# Patient Record
Sex: MALE | Race: WHITE | Employment: OTHER | URBAN - METROPOLITAN AREA
[De-identification: names, ages, dates, MRNs, and addresses within clinical notes are randomized per-mention and may not be internally consistent; named-entity substitution may affect disease eponyms.]

---

## 2017-09-03 ENCOUNTER — HOSPITAL ENCOUNTER (INPATIENT)
Age: 72
LOS: 2 days | Discharge: HOME | DRG: 251 | End: 2017-09-05
Attending: EMERGENCY MEDICINE | Admitting: INTERNAL MEDICINE
Payer: MEDICARE

## 2017-09-03 ENCOUNTER — APPOINTMENT (OUTPATIENT)
Dept: GENERAL RADIOLOGY | Age: 72
DRG: 251 | End: 2017-09-03
Attending: EMERGENCY MEDICINE
Payer: MEDICARE

## 2017-09-03 DIAGNOSIS — R06.03 RESPIRATORY DISTRESS: Primary | ICD-10-CM

## 2017-09-03 DIAGNOSIS — J81.0 ACUTE PULMONARY EDEMA (HCC): ICD-10-CM

## 2017-09-03 DIAGNOSIS — I10 ESSENTIAL HYPERTENSION: ICD-10-CM

## 2017-09-03 PROBLEM — I50.1 PULMONARY EDEMA CARDIAC CAUSE (HCC): Status: ACTIVE | Noted: 2017-09-03

## 2017-09-03 PROBLEM — R73.9 ACUTE HYPERGLYCEMIA: Status: ACTIVE | Noted: 2017-09-03

## 2017-09-03 LAB
ALBUMIN SERPL-MCNC: 3.1 G/DL (ref 3.2–4.6)
ALBUMIN/GLOB SERPL: 0.7 {RATIO} (ref 1.2–3.5)
ALP SERPL-CCNC: 150 U/L (ref 50–136)
ALT SERPL-CCNC: 31 U/L (ref 12–65)
ANION GAP SERPL CALC-SCNC: 14 MMOL/L (ref 7–16)
ARTERIAL PATENCY WRIST A: POSITIVE
AST SERPL-CCNC: 56 U/L (ref 15–37)
ATRIAL RATE: 137 BPM
BASE DEFICIT BLDA-SCNC: 7.9 MMOL/L (ref 0–2)
BASOPHILS # BLD: 0.1 K/UL (ref 0–0.2)
BASOPHILS NFR BLD: 0 % (ref 0–2)
BDY SITE: ABNORMAL
BILIRUB SERPL-MCNC: 0.7 MG/DL (ref 0.2–1.1)
BNP SERPL-MCNC: 1241 PG/ML
BUN SERPL-MCNC: 6 MG/DL (ref 8–23)
CALCIUM SERPL-MCNC: 8.4 MG/DL (ref 8.3–10.4)
CALCULATED P AXIS, ECG09: -99 DEGREES
CALCULATED R AXIS, ECG10: 70 DEGREES
CALCULATED T AXIS, ECG11: -90 DEGREES
CHLORIDE SERPL-SCNC: 101 MMOL/L (ref 98–107)
CO2 SERPL-SCNC: 24 MMOL/L (ref 21–32)
COHGB MFR BLD: 1.4 % (ref 0.5–1.5)
CREAT SERPL-MCNC: 1.42 MG/DL (ref 0.8–1.5)
DIAGNOSIS, 93000: NORMAL
DIFFERENTIAL METHOD BLD: ABNORMAL
DO-HGB BLD-MCNC: 3 % (ref 0–5)
EOSINOPHIL # BLD: 0.2 K/UL (ref 0–0.8)
EOSINOPHIL NFR BLD: 1 % (ref 0.5–7.8)
ERYTHROCYTE [DISTWIDTH] IN BLOOD BY AUTOMATED COUNT: 14.2 % (ref 11.9–14.6)
GLOBULIN SER CALC-MCNC: 4.2 G/DL (ref 2.3–3.5)
GLUCOSE BLD STRIP.AUTO-MCNC: 126 MG/DL (ref 65–100)
GLUCOSE BLD STRIP.AUTO-MCNC: 129 MG/DL (ref 65–100)
GLUCOSE BLD STRIP.AUTO-MCNC: 131 MG/DL (ref 65–100)
GLUCOSE BLD STRIP.AUTO-MCNC: 214 MG/DL (ref 65–100)
GLUCOSE SERPL-MCNC: 362 MG/DL (ref 65–100)
HCO3 BLDA-SCNC: 19 MMOL/L (ref 22–26)
HCT VFR BLD AUTO: 47.5 % (ref 41.1–50.3)
HGB BLD-MCNC: 16.5 G/DL (ref 13.6–17.2)
HGB BLDMV-MCNC: 16.5 GM/DL (ref 11.7–15)
IMM GRANULOCYTES # BLD: 0.1 K/UL (ref 0–0.5)
IMM GRANULOCYTES NFR BLD: 0.5 % (ref 0–5)
LACTATE BLD-SCNC: 6 MMOL/L (ref 0.5–1.9)
LYMPHOCYTES # BLD: 7 K/UL (ref 0.5–4.6)
LYMPHOCYTES NFR BLD: 47 % (ref 13–44)
MCH RBC QN AUTO: 31.4 PG (ref 26.1–32.9)
MCHC RBC AUTO-ENTMCNC: 34.7 G/DL (ref 31.4–35)
MCV RBC AUTO: 90.5 FL (ref 79.6–97.8)
METHGB MFR BLD: 0.5 % (ref 0–1.5)
MONOCYTES # BLD: 0.7 K/UL (ref 0.1–1.3)
MONOCYTES NFR BLD: 5 % (ref 4–12)
NEUTS SEG # BLD: 6.9 K/UL (ref 1.7–8.2)
NEUTS SEG NFR BLD: 47 % (ref 43–78)
OXYHGB MFR BLDA: 95.1 % (ref 94–97)
P-R INTERVAL, ECG05: 142 MS
PCO2 BLDA: 44 MMHG (ref 35–45)
PEEP RESPIRATORY: 8 CM[H2O]
PH BLDA: 7.25 [PH] (ref 7.35–7.45)
PLATELET # BLD AUTO: 248 K/UL (ref 150–450)
PMV BLD AUTO: 9.6 FL (ref 10.8–14.1)
PO2 BLDA: 101 MMHG (ref 80–105)
POTASSIUM SERPL-SCNC: 3.3 MMOL/L (ref 3.5–5.1)
PROT SERPL-MCNC: 7.3 G/DL (ref 6.3–8.2)
Q-T INTERVAL, ECG07: 294 MS
QRS DURATION, ECG06: 120 MS
QTC CALCULATION (BEZET), ECG08: 443 MS
RBC # BLD AUTO: 5.25 M/UL (ref 4.23–5.67)
RESP RATE: 30
SAO2 % BLD: 97 % (ref 92–98.5)
SERVICE CMNT-IMP: ABNORMAL
SODIUM SERPL-SCNC: 139 MMOL/L (ref 136–145)
TROPONIN I BLD-MCNC: 0.1 NG/ML (ref 0–0.08)
TROPONIN I SERPL-MCNC: 0.21 NG/ML (ref 0.02–0.05)
VENTILATION MODE VENT: ABNORMAL
VENTRICULAR RATE, ECG03: 137 BPM
VT SETTING VENT: 700 ML
WBC # BLD AUTO: 14.9 K/UL (ref 4.3–11.1)

## 2017-09-03 PROCEDURE — 74011000250 HC RX REV CODE- 250: Performed by: EMERGENCY MEDICINE

## 2017-09-03 PROCEDURE — 74011250637 HC RX REV CODE- 250/637: Performed by: EMERGENCY MEDICINE

## 2017-09-03 PROCEDURE — 77010033678 HC OXYGEN DAILY

## 2017-09-03 PROCEDURE — 74011250637 HC RX REV CODE- 250/637: Performed by: INTERNAL MEDICINE

## 2017-09-03 PROCEDURE — 96365 THER/PROPH/DIAG IV INF INIT: CPT | Performed by: EMERGENCY MEDICINE

## 2017-09-03 PROCEDURE — 96375 TX/PRO/DX INJ NEW DRUG ADDON: CPT | Performed by: EMERGENCY MEDICINE

## 2017-09-03 PROCEDURE — 80053 COMPREHEN METABOLIC PANEL: CPT | Performed by: EMERGENCY MEDICINE

## 2017-09-03 PROCEDURE — 96366 THER/PROPH/DIAG IV INF ADDON: CPT | Performed by: EMERGENCY MEDICINE

## 2017-09-03 PROCEDURE — 36600 WITHDRAWAL OF ARTERIAL BLOOD: CPT

## 2017-09-03 PROCEDURE — 94760 N-INVAS EAR/PLS OXIMETRY 1: CPT

## 2017-09-03 PROCEDURE — C8929 TTE W OR WO FOL WCON,DOPPLER: HCPCS

## 2017-09-03 PROCEDURE — 74011250636 HC RX REV CODE- 250/636: Performed by: EMERGENCY MEDICINE

## 2017-09-03 PROCEDURE — 82803 BLOOD GASES ANY COMBINATION: CPT

## 2017-09-03 PROCEDURE — 94660 CPAP INITIATION&MGMT: CPT

## 2017-09-03 PROCEDURE — 99285 EMERGENCY DEPT VISIT HI MDM: CPT | Performed by: EMERGENCY MEDICINE

## 2017-09-03 PROCEDURE — 71010 XR CHEST PORT: CPT

## 2017-09-03 PROCEDURE — 93005 ELECTROCARDIOGRAM TRACING: CPT | Performed by: EMERGENCY MEDICINE

## 2017-09-03 PROCEDURE — 83880 ASSAY OF NATRIURETIC PEPTIDE: CPT | Performed by: EMERGENCY MEDICINE

## 2017-09-03 PROCEDURE — 36415 COLL VENOUS BLD VENIPUNCTURE: CPT | Performed by: INTERNAL MEDICINE

## 2017-09-03 PROCEDURE — 74011000250 HC RX REV CODE- 250: Performed by: INTERNAL MEDICINE

## 2017-09-03 PROCEDURE — 65660000000 HC RM CCU STEPDOWN

## 2017-09-03 PROCEDURE — 82962 GLUCOSE BLOOD TEST: CPT

## 2017-09-03 PROCEDURE — 84484 ASSAY OF TROPONIN QUANT: CPT

## 2017-09-03 PROCEDURE — 83605 ASSAY OF LACTIC ACID: CPT

## 2017-09-03 PROCEDURE — 74011250636 HC RX REV CODE- 250/636: Performed by: INTERNAL MEDICINE

## 2017-09-03 PROCEDURE — 85025 COMPLETE CBC W/AUTO DIFF WBC: CPT | Performed by: EMERGENCY MEDICINE

## 2017-09-03 RX ORDER — ISOSORBIDE MONONITRATE 30 MG/1
30 TABLET, EXTENDED RELEASE ORAL DAILY
Status: DISCONTINUED | OUTPATIENT
Start: 2017-09-03 | End: 2017-09-05 | Stop reason: HOSPADM

## 2017-09-03 RX ORDER — FUROSEMIDE 40 MG/1
40 TABLET ORAL DAILY
Status: DISCONTINUED | OUTPATIENT
Start: 2017-09-04 | End: 2017-09-05

## 2017-09-03 RX ORDER — CARVEDILOL 12.5 MG/1
12.5 TABLET ORAL 2 TIMES DAILY WITH MEALS
Status: DISCONTINUED | OUTPATIENT
Start: 2017-09-03 | End: 2017-09-04

## 2017-09-03 RX ORDER — SPIRONOLACTONE 25 MG/1
25 TABLET ORAL DAILY
Status: DISCONTINUED | OUTPATIENT
Start: 2017-09-03 | End: 2017-09-05 | Stop reason: HOSPADM

## 2017-09-03 RX ORDER — INSULIN LISPRO 100 [IU]/ML
INJECTION, SOLUTION INTRAVENOUS; SUBCUTANEOUS
Status: DISCONTINUED | OUTPATIENT
Start: 2017-09-03 | End: 2017-09-05 | Stop reason: HOSPADM

## 2017-09-03 RX ORDER — FUROSEMIDE 10 MG/ML
60 INJECTION INTRAMUSCULAR; INTRAVENOUS
Status: COMPLETED | OUTPATIENT
Start: 2017-09-03 | End: 2017-09-03

## 2017-09-03 RX ORDER — ATORVASTATIN CALCIUM 40 MG/1
40 TABLET, FILM COATED ORAL DAILY
Status: DISCONTINUED | OUTPATIENT
Start: 2017-09-03 | End: 2017-09-05 | Stop reason: HOSPADM

## 2017-09-03 RX ORDER — NITROGLYCERIN 0.4 MG/1
0.4 TABLET SUBLINGUAL
Status: DISCONTINUED | OUTPATIENT
Start: 2017-09-03 | End: 2017-09-04 | Stop reason: SDUPTHER

## 2017-09-03 RX ORDER — GUAIFENESIN 100 MG/5ML
81 LIQUID (ML) ORAL DAILY
Status: DISCONTINUED | OUTPATIENT
Start: 2017-09-03 | End: 2017-09-04 | Stop reason: SDUPTHER

## 2017-09-03 RX ORDER — ENOXAPARIN SODIUM 100 MG/ML
40 INJECTION SUBCUTANEOUS EVERY 24 HOURS
Status: DISCONTINUED | OUTPATIENT
Start: 2017-09-03 | End: 2017-09-05 | Stop reason: HOSPADM

## 2017-09-03 RX ORDER — ASPIRIN 81 MG/1
81 TABLET ORAL DAILY
Status: DISCONTINUED | OUTPATIENT
Start: 2017-09-03 | End: 2017-09-03 | Stop reason: SDUPTHER

## 2017-09-03 RX ORDER — NITROGLYCERIN 0.4 MG/1
0.4 TABLET SUBLINGUAL AS NEEDED
Status: DISCONTINUED | OUTPATIENT
Start: 2017-09-03 | End: 2017-09-04 | Stop reason: SDUPTHER

## 2017-09-03 RX ORDER — TEMAZEPAM 15 MG/1
15 CAPSULE ORAL
Status: DISCONTINUED | OUTPATIENT
Start: 2017-09-03 | End: 2017-09-05 | Stop reason: HOSPADM

## 2017-09-03 RX ORDER — GUAIFENESIN 100 MG/5ML
324 LIQUID (ML) ORAL
Status: COMPLETED | OUTPATIENT
Start: 2017-09-03 | End: 2017-09-03

## 2017-09-03 RX ORDER — OXYCODONE AND ACETAMINOPHEN 5; 325 MG/1; MG/1
1 TABLET ORAL
Status: DISCONTINUED | OUTPATIENT
Start: 2017-09-03 | End: 2017-09-05 | Stop reason: HOSPADM

## 2017-09-03 RX ORDER — NITROGLYCERIN 20 MG/100ML
5 INJECTION INTRAVENOUS
Status: COMPLETED | OUTPATIENT
Start: 2017-09-03 | End: 2017-09-03

## 2017-09-03 RX ORDER — CLOPIDOGREL BISULFATE 75 MG/1
75 TABLET ORAL DAILY
Status: DISCONTINUED | OUTPATIENT
Start: 2017-09-03 | End: 2017-09-05 | Stop reason: HOSPADM

## 2017-09-03 RX ORDER — POTASSIUM CHLORIDE 20 MEQ/1
20 TABLET, EXTENDED RELEASE ORAL 2 TIMES DAILY
Status: COMPLETED | OUTPATIENT
Start: 2017-09-03 | End: 2017-09-04

## 2017-09-03 RX ORDER — FUROSEMIDE 40 MG/1
40 TABLET ORAL 2 TIMES DAILY
Status: COMPLETED | OUTPATIENT
Start: 2017-09-03 | End: 2017-09-03

## 2017-09-03 RX ADMIN — ENOXAPARIN SODIUM 40 MG: 40 INJECTION SUBCUTANEOUS at 10:29

## 2017-09-03 RX ADMIN — CARVEDILOL 12.5 MG: 12.5 TABLET, FILM COATED ORAL at 10:23

## 2017-09-03 RX ADMIN — ASPIRIN 81 MG 324 MG: 81 TABLET ORAL at 02:18

## 2017-09-03 RX ADMIN — POTASSIUM CHLORIDE 20 MEQ: 20 TABLET, EXTENDED RELEASE ORAL at 17:04

## 2017-09-03 RX ADMIN — NITROGLYCERIN 0.4 MG: 0.4 TABLET SUBLINGUAL at 02:25

## 2017-09-03 RX ADMIN — FUROSEMIDE 40 MG: 40 TABLET ORAL at 10:23

## 2017-09-03 RX ADMIN — FUROSEMIDE 60 MG: 10 INJECTION, SOLUTION INTRAMUSCULAR; INTRAVENOUS at 03:49

## 2017-09-03 RX ADMIN — NITROGLYCERIN 5 MCG/MIN: 20 INJECTION INTRAVENOUS at 02:18

## 2017-09-03 RX ADMIN — FUROSEMIDE 40 MG: 40 TABLET ORAL at 17:04

## 2017-09-03 RX ADMIN — CLOPIDOGREL BISULFATE 75 MG: 75 TABLET ORAL at 10:23

## 2017-09-03 RX ADMIN — SACUBITRIL AND VALSARTAN 1 TABLET: 24; 26 TABLET, FILM COATED ORAL at 17:04

## 2017-09-03 RX ADMIN — ISOSORBIDE MONONITRATE 30 MG: 30 TABLET, EXTENDED RELEASE ORAL at 10:23

## 2017-09-03 RX ADMIN — PERFLUTREN 1 ML: 6.52 INJECTION, SUSPENSION INTRAVENOUS at 09:00

## 2017-09-03 RX ADMIN — CARVEDILOL 12.5 MG: 12.5 TABLET, FILM COATED ORAL at 21:23

## 2017-09-03 RX ADMIN — ATORVASTATIN CALCIUM 40 MG: 40 TABLET, FILM COATED ORAL at 10:23

## 2017-09-03 RX ADMIN — ASPIRIN 81 MG 81 MG: 81 TABLET ORAL at 10:24

## 2017-09-03 RX ADMIN — SPIRONOLACTONE 25 MG: 25 TABLET, FILM COATED ORAL at 10:23

## 2017-09-03 RX ADMIN — POTASSIUM CHLORIDE 20 MEQ: 20 TABLET, EXTENDED RELEASE ORAL at 10:23

## 2017-09-03 NOTE — ED TRIAGE NOTES
Pt arrives via EMS for sob. States started tonight with hx of CHF. EMS states initial o2 78% on RA. Pt received 2 g mag, solu medrol, and duo treatment via cpap in route. EMS states o2 up to 88%.

## 2017-09-03 NOTE — PROGRESS NOTES
Bedside and Verbal shift change report given to self (oncoming nurse) by Naomi Butts RN (offgoing nurse). Report included the following information SBAR, Kardex, MAR and Recent Results.

## 2017-09-03 NOTE — ED NOTES
TRANSFER - OUT REPORT:    Verbal report given to [id number] CORONA Rojas (name) on Cynthia Prasad  being transferred to [id number] (unit) for routine progression of care       Report consisted of patients Situation, Background, Assessment and   Recommendations(SBAR). Information from the following report(s) SBAR was reviewed with the receiving nurse. Lines:   Peripheral IV 09/13/16 Right Antecubital (Active)       Peripheral IV 09/14/16 Left Arm (Active)        Opportunity for questions and clarification was provided.       Patient transported with:   Monitor

## 2017-09-03 NOTE — PROGRESS NOTES
Arrived via ED stretcher. Walked to bed with assistance. Connected to monitor, oriented to room. Denies pain. Shortness of breath evident with movement. Unproductive cough present. Instructed to call for assistance before attempting to get out of bed. Initial skin assessment completed. No skin issues noted.

## 2017-09-03 NOTE — IP AVS SNAPSHOT
Jose Juan Jose 
 
 
 [address] [facility] [address] [facility] 
[phone] Patient: Qiana Wild MRN: [id number] :1945 You are allergic to the following No active allergies Recent Documentation Height Weight BMI Smoking Status 1.753 m 59.2 kg 19.27 kg/m2 Light Tobacco Smoker Emergency Contacts Name Discharge Info Relation Home Work Mobile Romina Gray  Parent [1] [phone] About your hospitalization You were admitted on:  September 3, 2017 You last received care in the:  [facility] 3 TELEMETRY You were discharged on:  2017 Unit phone number:  [phone] Why you were hospitalized Your primary diagnosis was:  Pulmonary Edema Cardiac Cause (Hcc) Your diagnoses also included:  Coronary Atherosclerosis Of Native Coronary Artery, Ischemic Cardiomyopathy, Aicd (Automatic Cardioverter/Defibrillator) Present, Htn (Hypertension), Benign, Mixed Hyperlipidemia, Chronic Systolic Heart Failure (Hcc), Acute Hyperglycemia Providers Seen During Your Hospitalizations Provider Role Specialty Primary office phone Tegan Michele MD Attending Provider Emergency Medicine [phone] Jeimy Reaves MD Attending Provider Cardiology [phone] Your Primary Care Physician (PCP) Primary Care Physician Office Phone Office Fax [address] [place] [facility] [place] [facility] 55 [facility] Follow-up Information Follow up With Details Comments Contact Info Haydee Gonzalez MD  As needed [address] [place] [id number] [phone] Loreta Sterling MD  In [place] office  @ [address] [place] [address] Road [address] [place] [address] 
[phone] Your Appointments 2017 10:00 AM EDT TRANSITIONAL CARE MANAGEMENT with Loreta Sterling MD  
[facility] ([facility]) [address] [place] [address] 
[address] Star [place]  
[phone] Friday September 22, 2017  9:20 AM EDT  
OFFICE DEVICE CHECKS with [facility] CARDIOLOGY [place] [facility] ([address] [place] [address]) [address]  
[phone] This upcoming device check will take place IN OUR OFFICE. Please arrive 15 minutes early to review any necessary paperwork requirements. If you have any further questions or need to change this appointment, we are happy to help and can be reached at [phone]. Current Discharge Medication List  
  
START taking these medications Dose & Instructions Dispensing Information Comments Morning Noon Evening Bedtime  
 isosorbide mononitrate ER 30 mg tablet Commonly known as:  IMDUR Your next dose is:  9/6 Dose:  30 mg Take 1 Tab by mouth daily. Quantity:  30 Tab Refills:  6  
     
   
   
   
  
 sacubitril-valsartan 24-26 mg tablet Commonly known as:  ENTRESTO Your next dose is:  9/5 Dose:  1 Tab Take 1 Tab by mouth every twelve (12) hours. Quantity:  60 Tab Refills:  6 CONTINUE these medications which have CHANGED Dose & Instructions Dispensing Information Comments Morning Noon Evening Bedtime  
 carvedilol 3.125 mg tablet Commonly known as:  Michelle Finder What changed:   
- medication strength 
- how much to take Your next dose is:  9/5 Dose:  3.125 mg Take 1 Tab by mouth two (2) times daily (with meals). Quantity:  60 Tab Refills:  6 CONTINUE these medications which have NOT CHANGED Dose & Instructions Dispensing Information Comments Morning Noon Evening Bedtime  
 aspirin 81 mg chewable tablet Your next dose is:  9/6 Dose:  81 mg Take 1 Tab by mouth daily. Quantity:  30 Tab Refills:  11  
     
   
   
   
  
 atorvastatin 40 mg tablet Commonly known as:  LIPITOR Your next dose is:  9/6 Dose:  40 mg Take 1 Tab by mouth daily. Quantity:  90 Tab Refills:  3 PLAVIX 75 mg Tab Generic drug:  clopidogrel Your next dose is:  9/6 Dose:  75 mg Take 75 mg by mouth daily. Last dose 5-10-11. Refills:  0  
     
   
   
   
  
 spironolactone 25 mg tablet Commonly known as:  ALDACTONE Your next dose is:  9/6 Dose:  25 mg Take 1 Tab by mouth daily. Quantity:  90 Tab Refills:  3 STOP taking these medications   
 enalapril 5 mg tablet Commonly known as:  Darreld Standard Where to Get Your Medications Information on where to get these meds will be given to you by the nurse or doctor. ! Ask your nurse or doctor about these medications  
  carvedilol 3.125 mg tablet  
 isosorbide mononitrate ER 30 mg tablet  
 sacubitril-valsartan 24-26 mg tablet Discharge Instructions Sacubitril/Valsartan Kyler Harish) - (By mouth) Why this medicine is used:  
Treats chronic heart failure. Contact a nurse or doctor right away if you have: 
· Itching or hives, swelling in your face or hands, swelling or tingling in your mouth or throat, chest tightness, trouble breathing · Uneven heartbeat, trouble breathing · Confusion, weakness, numbness in your hands, feet, or lips · Decrease in how much or how often you urinate, bloody or cloudy urine · Lightheadedness, dizziness, or fainting · Lower back or side pain © 2017 [facility] Information is for End User's use only and may not be sold, redistributed or otherwise used for commercial purposes. Heart Failure: Care Instructions Your Care Instructions Heart failure occurs when your heart does not pump as much blood as the body needs. Failure does not mean that the heart has stopped pumping but rather that it is not pumping as well as it should.  Over time, this causes fluid buildup in your lungs and other parts of your body. Fluid buildup can cause shortness of breath, fatigue, swollen ankles, and other problems. By taking medicines regularly, reducing sodium (salt) in your diet, checking your weight every day, and making lifestyle changes, you can feel better and live longer. Follow-up care is a key part of your treatment and safety. Be sure to make and go to all appointments, and call your doctor if you are having problems. It's also a good idea to know your test results and keep a list of the medicines you take. How can you care for yourself at home? Medicines · Be safe with medicines. Take your medicines exactly as prescribed. Call your doctor if you think you are having a problem with your medicine. · Do not take any vitamins, over-the-counter medicine, or herbal products without talking to your doctor first. Julius Bolder not take ibuprofen (Advil or Motrin) and naproxen (Aleve) without talking to your doctor first. They could make your heart failure worse. · You may be taking some of the following medicine. ¨ Beta-blockers can slow heart rate, decrease blood pressure, and improve your condition. Taking a beta-blocker may lower your chance of needing to be hospitalized. ¨ Angiotensin-converting enzyme inhibitors (ACEIs) reduce the heart's workload, lower blood pressure, and reduce swelling. Taking an ACEI may lower your chance of needing to be hospitalized again. ¨ Angiotensin II receptor blockers (ARBs) work like ACEIs. Your doctor may prescribe them instead of ACEIs. ¨ Diuretics, also called water pills, reduce swelling. ¨ Potassium supplements replace this important mineral, which is sometimes lost with diuretics. ¨ Aspirin and other blood thinners prevent blood clots, which can cause a stroke or heart attack. You will get more details on the specific medicines your doctor prescribes. Diet · Your doctor may suggest that you limit sodium to 2,000 milligrams (mg) a day or less. That is less than 1 teaspoon of salt a day, including all the salt you eat in cooking or in packaged foods. People get most of their sodium from processed foods. Fast food and restaurant meals also tend to be very high in sodium. · Ask your doctor how much liquid you can drink each day. You may have to limit liquids. Weight · Weigh yourself without clothing at the same time each day. Record your weight. Call your doctor if you have a sudden weight gain, such as more than 2 to 3 pounds in a day or 5 pounds in a week. (Your doctor may suggest a different range of weight gain.) A sudden weight gain may mean that your heart failure is getting worse. Activity level · Start light exercise (if your doctor says it is okay). Even if you can only do a small amount, exercise will help you get stronger, have more energy, and manage your weight and your stress. Walking is an easy way to get exercise. Start out by walking a little more than you did before. Bit by bit, increase the amount you walk. · When you exercise, watch for signs that your heart is working too hard. You are pushing yourself too hard if you cannot talk while you are exercising. If you become short of breath or dizzy or have chest pain, stop, sit down, and rest. 
· If you feel \"wiped out\" the day after you exercise, walk slower or for a shorter distance until you can work up to a better pace. · Get enough rest at night. Sleeping with 1 or 2 pillows under your upper body and head may help you breathe easier. Lifestyle changes · Do not smoke. Smoking can make a heart condition worse. If you need help quitting, talk to your doctor about stop-smoking programs and medicines. These can increase your chances of quitting for good. Quitting smoking may be the most important step you can take to protect your heart. · Limit alcohol to 2 drinks a day for men and 1 drink a day for women. Too much alcohol can cause health problems. · Avoid getting sick from colds and the flu. Get a pneumococcal vaccine shot. If you have had one before, ask your doctor whether you need another dose. Get a flu shot each year. If you must be around people with colds or the flu, wash your hands often. When should you call for help? Call 911 if you have symptoms of sudden heart failure such as: 
· You have severe trouble breathing. · You cough up pink, foamy mucus. · You have a new irregular or rapid heartbeat. Call your doctor now or seek immediate medical care if: 
· You have new or increased shortness of breath. · You are dizzy or lightheaded, or you feel like you may faint. · You have sudden weight gain, such as more than 2 to 3 pounds in a day or 5 pounds in a week. (Your doctor may suggest a different range of weight gain.) · You have increased swelling in your legs, ankles, or feet. · You are suddenly so tired or weak that you cannot do your usual activities. Watch closely for changes in your health, and be sure to contact your doctor if: 
· You develop new symptoms. Where can you learn more? Go to http://raj-savannah.info/. Enter P732 in the search box to learn more about \"Heart Failure: Care Instructions. \" Current as of: April 3, 2017 Content Version: 11.3 © 2909-3433 [employer]. Care instructions adapted under license by [employer] (which disclaims liability or warranty for this information). If you have questions about a medical condition or this instruction, always ask your healthcare professional. [place] [address] any warranty or liability for your use of this information. Cardiac Catheterization/Angiography Discharge Instructions *Check the puncture site frequently for swelling or bleeding. If you see any bleeding, lie down and apply pressure over the area with a clean town or washcloth.  Notify your doctor for any redness, swelling, drainage or oozing from the puncture site. Notify your doctor for any fever or chills. *If the leg or arm with the puncture becomes cold, numb or painful, call Dr Leticia Farrell at  Leticia Martin *Activity should be limited for the next 48 hours. Climb stairs as little as possible and avoid any stooping, bending or strenuous activity for 48 hours. No heavy lifting (anything over 10 pounds) for three days. *Do not drive for 48 hours. *You may resume your usual diet. Drink more fluids than usual. 
 
*Have a responsible person drive you home and stay with you for at least 24 hours after your heart catheterization/angiography. *You may remove the bandage from your {ARM/GROIN:79380} in 24 hours. You may shower in 24 hours. No tub baths, hot tubs or swimming for one week. Do not place any lotions, creams, powders, ointments over the puncture site for one week. You may place a clean band-aid over the puncture site each day for 5 days. Change this daily. Discharge Orders Procedure Order Date Status Priority Quantity Spec Type Associated Dx METABOLIC PANEL, BASIC 57/57/46 1352 Future Routine 1 Blood Comments:  Indication --CHF Please obtain in one week, one day prior to appt. [facility] Announcement We are excited to announce that we are making your provider's discharge notes available to you in [facility]. You will see these notes when they are completed and signed by the physician that discharged you from your recent hospital stay. If you have any questions or concerns about any information you see in [facility], please call the Health Information Department where you were seen or reach out to your Primary Care Provider for more information about your plan of care. Introducing [facility]! [facility] introduces [facility] patient portal. Now you can access parts of your medical record, email your doctor's office, and request medication refills online.    
 
1. In your internet browser, go to [email or web address] 2. Click on the First Time User? Click Here link in the Sign In box. You will see the New Member Sign Up page. 3. Enter your [facility] Access Code exactly as it appears below. You will not need to use this code after youve completed the sign-up process. If you do not sign up before the expiration date, you must request a new code. · [facility] Access Code: [email or web address] Expires: 9/14/2017  7:53 AM 
 
4. Enter the last four digits of your Social Security Number (xxxx) and Date of Birth (mm/dd/yyyy) as indicated and click Submit. You will be taken to the next sign-up page. 5. Create a [facility] ID. This will be your [facility] login ID and cannot be changed, so think of one that is secure and easy to remember. 6. Create a [facility] password. You can change your password at any time. 7. Enter your Password Reset Question and Answer. This can be used at a later time if you forget your password. 8. Enter your e-mail address. You will receive e-mail notification when new information is available in [address] 9. Click Sign Up. You can now view and download portions of your medical record. 10. Click the Download Summary menu link to download a portable copy of your medical information. If you have questions, please visit the Frequently Asked Questions section of the [facility] website. Remember, [facility] is NOT to be used for urgent needs. For medical emergencies, dial 911. Now available from your iPhone and Android! General Information Please provide this summary of care documentation to your next provider. Patient Signature:  ____________________________________________________________ Date:  ____________________________________________________________  
  
Suzanna Pane Provider Signature:  ____________________________________________________________ Date:  ____________________________________________________________

## 2017-09-03 NOTE — ED NOTES
This RN did not see patient. Report was given to Karrie GAITAN RN. Co-workers assisted with EMS arrival, blood draw, and medications.

## 2017-09-03 NOTE — H&P
[facility] CARDIOLOGY ADMIT NOTE    9/3/2017 6:33 AM    Admit Date: 9/3/2017    Admit Diagnosis: Pulmonary edema cardiac cause [facility])  Family Physician:   Other Physician:  Subjective:   Brandon Alvarez is a 81 y.o. male  who has an ischemic CM. He presents with acute dyspnea pulmonary edema after watching football with his friends last night. He had mild chest tightness and then developed respiratory distress lying back in bed. Episode of breathing problems treated at [facility] about a month ago. He seems to be unsure about his medical regimen but does note no lasix for the last two days     No Known Allergies    Past Medical History:   Diagnosis Date    AICD (automatic cardioverter/defibrillator) present 5/18/2016    Arrhythmia     irregular    CAD (coronary artery disease)     stent    Chronic systolic heart failure ([address]     Coronary atherosclerosis of native coronary artery     GERD (gastroesophageal reflux disease)     Hypertension     Ischemic cardiomyopathy     Tobacco use disorder 5/18/2016       Family History:   CAD-    Social History:  Alcohol:none                             Tobacco: . Quit    Review of Systems:  Constitutional- no recent fever, chills, abnormal weight loss  Eyes- no recent visual changes  Ears- no recent hearing loss  Cardiac- see HPI  Pulmonary- no hemoptysis   Gastrointestinal- no melena   Genitourinary- no hematuria or dysuria  Neurologic- no seizures or syncope  Musculoskeletal- no acute joint pain or myalgias           Objective:      Vitals:    09/03/17 0439 09/03/17 0459 09/03/17 0519 09/03/17 0543   BP: 115/70 93/67 93/67    Pulse: 93 96 90    Resp:       SpO2: 98% 99% 99% 95%   Weight:       Height:           Physical Exam:  Neuro:  alert and oriented. No focal neurologic deficits  Skin: warm and dry  HEENT:  NC/AT, conjunctiva clear, sclera anicteric,   Neck: supple without adenopathy or thyromegaly.  No JVD  CV    apex lift displaced anterior axillary line soft blow   Lungs: clear bilaterally  Abdomen: soft, , nondistended, normal bowel sounds  Extremities: no  edema      2+   pulses     Psychiatric: normal mood and affect   EKG--sinus tach LBBB  CXR pulmonary edema       No current facility-administered medications on file prior to encounter. Current Outpatient Prescriptions on File Prior to Encounter   Medication Sig Dispense Refill    carvedilol (COREG) 12.5 mg tablet Take 1 Tab by mouth two (2) times daily (with meals). 180 Tab 3    spironolactone (ALDACTONE) 25 mg tablet Take 1 Tab by mouth daily. 90 Tab 3    atorvastatin (LIPITOR) 40 mg tablet Take 1 Tab by mouth daily. 90 Tab 3    enalapril (VASOTEC) 5 mg tablet Take 1 Tab by mouth daily. 180 Tab 3    aspirin 81 mg chewable tablet Take 1 Tab by mouth daily. 30 Tab 11    oxycodone-acetaminophen (PERCOCET) 5-325 mg per tablet Take 1 Tab by mouth every four (4) hours as needed. 10 Tab 0    clopidogrel (PLAVIX) 75 mg tablet Take 75 mg by mouth daily. Last dose 5-10-11. Data Review:   Recent Labs      09/03/17   0158   NA  139   K  3.3*   BUN  6*   CREA  1.42   GLU  362*   WBC  14.9*   HGB  16.5   HCT  47.5   PLT  248            Assessment and Plan:     Principal Problem:    Pulmonary edema cardiac cause ([facility] (9/3/2017)    history of MI stenting last cath 2009     may need repeat. noncompliance lasix an issue as well   Active Problems:    Coronary atherosclerosis of native coronary artery (10/14/2009)      Ischemic cardiomyopathy (10/14/2009)      Mixed hyperlipidemia (10/14/2009)      HTN (hypertension), benign (10/14/2009)      Chronic systolic heart failure ([address] (5/17/2011)      AICD (automatic cardioverter/defibrillator) present (5/18/2016)      Acute hyperglycemia (9/3/2017)      Overview: secondary to respiratory and stress ?  new onset DM             SSI and follow           Christian Comer MD  [facility] Cardiology  [phone]

## 2017-09-03 NOTE — IP AVS SNAPSHOT
[address] 
[phone] Patient: Luis A Greenwood MRN: [id number] :1945 Current Discharge Medication List  
  
START taking these medications Dose & Instructions Dispensing Information Comments Morning Noon Evening Bedtime  
 isosorbide mononitrate ER 30 mg tablet Commonly known as:  IMDUR Your next dose is:   Dose:  30 mg Take 1 Tab by mouth daily. Quantity:  30 Tab Refills:  6  
     
   
   
   
  
 sacubitril-valsartan 24-26 mg tablet Commonly known as:  ENTRESTO Your next dose is:   Dose:  1 Tab Take 1 Tab by mouth every twelve (12) hours. Quantity:  60 Tab Refills:  6 CONTINUE these medications which have CHANGED Dose & Instructions Dispensing Information Comments Morning Noon Evening Bedtime  
 carvedilol 3.125 mg tablet Commonly known as:  Anuradhafatuma Wetzel What changed:   
- medication strength 
- how much to take Your next dose is:   Dose:  3.125 mg Take 1 Tab by mouth two (2) times daily (with meals). Quantity:  60 Tab Refills:  6 CONTINUE these medications which have NOT CHANGED Dose & Instructions Dispensing Information Comments Morning Noon Evening Bedtime  
 aspirin 81 mg chewable tablet Your next dose is:   Dose:  81 mg Take 1 Tab by mouth daily. Quantity:  30 Tab Refills:  11  
     
   
   
   
  
 atorvastatin 40 mg tablet Commonly known as:  LIPITOR Your next dose is:   Dose:  40 mg Take 1 Tab by mouth daily. Quantity:  90 Tab Refills:  3 PLAVIX 75 mg Tab Generic drug:  clopidogrel Your next dose is:   Dose:  75 mg Take 75 mg by mouth daily. Last dose 5-10-11. Refills:  0  
     
   
   
   
  
 spironolactone 25 mg tablet Commonly known as:  ALDACTONE Your next dose is:    
 Dose:  25 mg Take 1 Tab by mouth daily. Quantity:  90 Tab Refills:  3 STOP taking these medications   
 enalapril 5 mg tablet Commonly known as:  Manuelterrie Triana Where to Get Your Medications Information on where to get these meds will be given to you by the nurse or doctor. ! Ask your nurse or doctor about these medications  
  carvedilol 3.125 mg tablet  
 isosorbide mononitrate ER 30 mg tablet  
 sacubitril-valsartan 24-26 mg tablet

## 2017-09-03 NOTE — ED PROVIDER NOTES
HPI Comments: History is obtained from the patient and EMS. He was apparently feeling fine until around midnight when he had the sudden onset of mild chest pressure and shortness of breath. This got progressively worse. After approximate hour, he got concerned and called EMS. According them, he was very tachypneic and blue with coarse breath sounds and wheezing throughout. He was placed on CPAP and given a DuoNeb and magnesium and brought here. He was hypertensive at the scene, 200/120. The patient is able to say that this is happened to him before. He has no history of COPD. He denies any aggravating or alleviating factors. Elements of this note were created using speech recognition software. As such, errors of speech recognition may be present. Patient is a 81 y.o. male presenting with shortness of breath. The history is provided by the patient. Shortness of Breath   Pertinent negatives include no fever and no vomiting. Past Medical History:   Diagnosis Date    AICD (automatic cardioverter/defibrillator) present 5/18/2016    Arrhythmia     irregular    CAD (coronary artery disease)     stent    Chronic systolic heart failure ([facility] [address]     Coronary atherosclerosis of native coronary artery     GERD (gastroesophageal reflux disease)     Hypertension     Ischemic cardiomyopathy     Tobacco use disorder 5/18/2016       Past Surgical History:   Procedure Laterality Date    CARDIAC SURG PROCEDURE UNLIST      6 stents; last one put in 2 years ago october 2009    HX HEART CATHETERIZATION      2009 october    HX OTHER SURGICAL      scrotum         Family History:   Problem Relation Age of Onset    Heart Disease Father     Cancer Sister        Social History     Social History    Marital status: LEGALLY      Spouse name: N/A    Number of children: N/A    Years of education: N/A     Occupational History    Not on file.      Social History Main Topics    Smoking status: Light Tobacco Smoker     Packs/day: 0.10     Years: 20.00     Last attempt to quit: 5/6/2011    Smokeless tobacco: Never Used    Alcohol use Yes      Comment: occasionally    Drug use: No    Sexual activity: Not on file     Other Topics Concern    Not on file     Social History Narrative         ALLERGIES: Review of patient's allergies indicates no known allergies. Review of Systems   Constitutional: Negative for chills and fever. Respiratory: Positive for shortness of breath. Gastrointestinal: Negative for nausea and vomiting. All other systems reviewed and are negative. Vitals:    09/03/17 0154   BP: (!) 164/114   Pulse: (!) 134   Resp: (!) 35   SpO2: (!) 88%   Weight: 61.7 kg (136 lb)   Height: 5' 9\" (1.753 m)            Physical Exam   Constitutional: He is oriented to person, place, and time. He appears well-developed and well-nourished. He appears distressed. HENT:   Head: Normocephalic and atraumatic. Eyes: Conjunctivae are normal. Pupils are equal, round, and reactive to light. Neck: Normal range of motion. Neck supple. Cardiovascular: Regular rhythm and normal heart sounds. Pulmonary/Chest: He is in respiratory distress. He has rales. Able to answer questions in 2-3 word sentences   Abdominal: Soft. He exhibits no distension. There is no tenderness. Musculoskeletal: He exhibits no edema or tenderness. Neurological: He is alert and oriented to person, place, and time. Skin: Skin is warm and dry. Psychiatric: He has a normal mood and affect. His behavior is normal.   Nursing note and vitals reviewed. MDM  Number of Diagnoses or Management Options  Acute pulmonary edema ([address]): new and requires workup  Essential hypertension:   Respiratory distress:   Diagnosis management comments: Differential diagnosis: COPD exacerbation, CHF, hypertensive emergency, pneumonia, MI  2:03 AM O2 sat originally was in the mid [de-identified] on CPAP.   O2 sat is now mid 90s on BiPAP  2:28 AM patient appears more comfortable on BiPAP, PO2 is 100 on 100% oxygen  2:47 AM ssurgical records shows he had  An echocardiogram recently though I cannot pull up the report. He had one last year which showed an ejection fraction of 10-15% with global hypokinesis  3:07 AM discussed results with patient, need for admission. His blood pressure has come down with the nitroglycerin and his breathing seems to have improved significantly. His troponin is mildly elevated that I feel that his  Main issue at this point is his congestive heart failure. His chest x-ray shows pulmonary edema and his BNP is 1200.  Total critical care time spent on initial evaluation, repeat evaluations, looking up old records managing his blood pressure and  Respiratory distress and speaking with the consultant was 45 minutes  4:34 AM Spoke with Dr Joaquín Aldridge, to see pt for admission       Amount and/or Complexity of Data Reviewed  Clinical lab tests: ordered and reviewed  Tests in the radiology section of CPT®: ordered and reviewed  Tests in the medicine section of CPT®: ordered and reviewed  Decide to obtain previous medical records or to obtain history from someone other than the patient: yes  Review and summarize past medical records: yes  Discuss the patient with other providers: yes  Independent visualization of images, tracings, or specimens: yes    Risk of Complications, Morbidity, and/or Mortality  Presenting problems: high  Diagnostic procedures: moderate  Management options: high    Patient Progress  Patient progress: improved    ED Course       Procedures

## 2017-09-04 LAB
ANION GAP SERPL CALC-SCNC: 10 MMOL/L (ref 7–16)
ATRIAL RATE: 57 BPM
BUN SERPL-MCNC: 16 MG/DL (ref 8–23)
CALCIUM SERPL-MCNC: 8.8 MG/DL (ref 8.3–10.4)
CALCULATED P AXIS, ECG09: 61 DEGREES
CALCULATED R AXIS, ECG10: 27 DEGREES
CALCULATED T AXIS, ECG11: 142 DEGREES
CHLORIDE SERPL-SCNC: 102 MMOL/L (ref 98–107)
CO2 SERPL-SCNC: 26 MMOL/L (ref 21–32)
CREAT SERPL-MCNC: 1.06 MG/DL (ref 0.8–1.5)
DIAGNOSIS, 93000: NORMAL
GLUCOSE BLD STRIP.AUTO-MCNC: 121 MG/DL (ref 65–100)
GLUCOSE BLD STRIP.AUTO-MCNC: 89 MG/DL (ref 65–100)
GLUCOSE BLD STRIP.AUTO-MCNC: 89 MG/DL (ref 65–100)
GLUCOSE BLD STRIP.AUTO-MCNC: 97 MG/DL (ref 65–100)
GLUCOSE SERPL-MCNC: 100 MG/DL (ref 65–100)
P-R INTERVAL, ECG05: 158 MS
POTASSIUM SERPL-SCNC: 4 MMOL/L (ref 3.5–5.1)
Q-T INTERVAL, ECG07: 498 MS
QRS DURATION, ECG06: 110 MS
QTC CALCULATION (BEZET), ECG08: 484 MS
SODIUM SERPL-SCNC: 138 MMOL/L (ref 136–145)
VENTRICULAR RATE, ECG03: 57 BPM

## 2017-09-04 PROCEDURE — 80048 BASIC METABOLIC PNL TOTAL CA: CPT | Performed by: INTERNAL MEDICINE

## 2017-09-04 PROCEDURE — 36415 COLL VENOUS BLD VENIPUNCTURE: CPT | Performed by: INTERNAL MEDICINE

## 2017-09-04 PROCEDURE — 77030012468 HC VLV BLEEDBK CNTRL ABBT -B

## 2017-09-04 PROCEDURE — 74011636320 HC RX REV CODE- 636/320: Performed by: INTERNAL MEDICINE

## 2017-09-04 PROCEDURE — B2151ZZ FLUOROSCOPY OF LEFT HEART USING LOW OSMOLAR CONTRAST: ICD-10-PCS | Performed by: INTERNAL MEDICINE

## 2017-09-04 PROCEDURE — 74011250636 HC RX REV CODE- 250/636

## 2017-09-04 PROCEDURE — 74011250636 HC RX REV CODE- 250/636: Performed by: INTERNAL MEDICINE

## 2017-09-04 PROCEDURE — C1769 GUIDE WIRE: HCPCS

## 2017-09-04 PROCEDURE — 74011250637 HC RX REV CODE- 250/637: Performed by: INTERNAL MEDICINE

## 2017-09-04 PROCEDURE — 74011000250 HC RX REV CODE- 250: Performed by: INTERNAL MEDICINE

## 2017-09-04 PROCEDURE — C1725 CATH, TRANSLUMIN NON-LASER: HCPCS

## 2017-09-04 PROCEDURE — B2111ZZ FLUOROSCOPY OF MULTIPLE CORONARY ARTERIES USING LOW OSMOLAR CONTRAST: ICD-10-PCS | Performed by: INTERNAL MEDICINE

## 2017-09-04 PROCEDURE — 93005 ELECTROCARDIOGRAM TRACING: CPT | Performed by: INTERNAL MEDICINE

## 2017-09-04 PROCEDURE — 99152 MOD SED SAME PHYS/QHP 5/>YRS: CPT

## 2017-09-04 PROCEDURE — 93458 L HRT ARTERY/VENTRICLE ANGIO: CPT

## 2017-09-04 PROCEDURE — 99153 MOD SED SAME PHYS/QHP EA: CPT

## 2017-09-04 PROCEDURE — 77030004534 HC CATH ANGI DX INFN CARD -A

## 2017-09-04 PROCEDURE — 77030029997 HC DEV COM RDL R BND TELE -B

## 2017-09-04 PROCEDURE — 92920 PRQ TRLUML C ANGIOP 1ART&/BR: CPT

## 2017-09-04 PROCEDURE — C1894 INTRO/SHEATH, NON-LASER: HCPCS

## 2017-09-04 PROCEDURE — 82962 GLUCOSE BLOOD TEST: CPT

## 2017-09-04 PROCEDURE — C1887 CATHETER, GUIDING: HCPCS

## 2017-09-04 PROCEDURE — 74011000258 HC RX REV CODE- 258: Performed by: INTERNAL MEDICINE

## 2017-09-04 PROCEDURE — 4A023N7 MEASUREMENT OF CARDIAC SAMPLING AND PRESSURE, LEFT HEART, PERCUTANEOUS APPROACH: ICD-10-PCS | Performed by: INTERNAL MEDICINE

## 2017-09-04 PROCEDURE — 77030015766

## 2017-09-04 PROCEDURE — 65660000000 HC RM CCU STEPDOWN

## 2017-09-04 PROCEDURE — 02703ZZ DILATION OF CORONARY ARTERY, ONE ARTERY, PERCUTANEOUS APPROACH: ICD-10-PCS | Performed by: INTERNAL MEDICINE

## 2017-09-04 RX ORDER — LIDOCAINE HYDROCHLORIDE 20 MG/ML
1-20 INJECTION, SOLUTION INFILTRATION; PERINEURAL
Status: DISCONTINUED | OUTPATIENT
Start: 2017-09-04 | End: 2017-09-04

## 2017-09-04 RX ORDER — LORAZEPAM 1 MG/1
1 TABLET ORAL
Status: DISCONTINUED | OUTPATIENT
Start: 2017-09-04 | End: 2017-09-05 | Stop reason: HOSPADM

## 2017-09-04 RX ORDER — CLOPIDOGREL 300 MG/1
300 TABLET, FILM COATED ORAL
Status: DISCONTINUED | OUTPATIENT
Start: 2017-09-04 | End: 2017-09-04

## 2017-09-04 RX ORDER — CLOPIDOGREL 300 MG/1
600 TABLET, FILM COATED ORAL
Status: COMPLETED | OUTPATIENT
Start: 2017-09-04 | End: 2017-09-04

## 2017-09-04 RX ORDER — FENTANYL CITRATE 50 UG/ML
25-50 INJECTION, SOLUTION INTRAMUSCULAR; INTRAVENOUS
Status: DISCONTINUED | OUTPATIENT
Start: 2017-09-04 | End: 2017-09-04

## 2017-09-04 RX ORDER — SODIUM CHLORIDE 0.9 % (FLUSH) 0.9 %
5-10 SYRINGE (ML) INJECTION EVERY 8 HOURS
Status: DISCONTINUED | OUTPATIENT
Start: 2017-09-04 | End: 2017-09-05 | Stop reason: HOSPADM

## 2017-09-04 RX ORDER — SODIUM CHLORIDE 9 MG/ML
75 INJECTION, SOLUTION INTRAVENOUS CONTINUOUS
Status: DISCONTINUED | OUTPATIENT
Start: 2017-09-04 | End: 2017-09-05

## 2017-09-04 RX ORDER — CARVEDILOL 3.12 MG/1
3.12 TABLET ORAL 2 TIMES DAILY WITH MEALS
Status: DISCONTINUED | OUTPATIENT
Start: 2017-09-04 | End: 2017-09-05 | Stop reason: HOSPADM

## 2017-09-04 RX ORDER — HYDROCODONE BITARTRATE AND ACETAMINOPHEN 5; 325 MG/1; MG/1
1 TABLET ORAL
Status: DISCONTINUED | OUTPATIENT
Start: 2017-09-04 | End: 2017-09-05 | Stop reason: HOSPADM

## 2017-09-04 RX ORDER — MAG HYDROX/ALUMINUM HYD/SIMETH 200-200-20
30 SUSPENSION, ORAL (FINAL DOSE FORM) ORAL ONCE
Status: COMPLETED | OUTPATIENT
Start: 2017-09-04 | End: 2017-09-04

## 2017-09-04 RX ORDER — SODIUM CHLORIDE 0.9 % (FLUSH) 0.9 %
5-10 SYRINGE (ML) INJECTION AS NEEDED
Status: DISCONTINUED | OUTPATIENT
Start: 2017-09-04 | End: 2017-09-05 | Stop reason: HOSPADM

## 2017-09-04 RX ORDER — GUAIFENESIN 100 MG/5ML
81 LIQUID (ML) ORAL DAILY
Status: DISCONTINUED | OUTPATIENT
Start: 2017-09-05 | End: 2017-09-05 | Stop reason: HOSPADM

## 2017-09-04 RX ORDER — MIDAZOLAM HYDROCHLORIDE 1 MG/ML
.5-2 INJECTION, SOLUTION INTRAMUSCULAR; INTRAVENOUS
Status: DISCONTINUED | OUTPATIENT
Start: 2017-09-04 | End: 2017-09-04

## 2017-09-04 RX ORDER — ACETAMINOPHEN 325 MG/1
650 TABLET ORAL
Status: DISCONTINUED | OUTPATIENT
Start: 2017-09-04 | End: 2017-09-05 | Stop reason: HOSPADM

## 2017-09-04 RX ORDER — NITROGLYCERIN 0.4 MG/1
0.4 TABLET SUBLINGUAL
Status: DISCONTINUED | OUTPATIENT
Start: 2017-09-04 | End: 2017-09-05 | Stop reason: HOSPADM

## 2017-09-04 RX ORDER — PHENYLEPHRINE HYDROCHLORIDE 10 MG/ML
10 INJECTION INTRAVENOUS ONCE
Status: COMPLETED | OUTPATIENT
Start: 2017-09-04 | End: 2017-09-04

## 2017-09-04 RX ORDER — HEPARIN SODIUM 200 [USP'U]/100ML
3 INJECTION, SOLUTION INTRAVENOUS CONTINUOUS
Status: DISCONTINUED | OUTPATIENT
Start: 2017-09-04 | End: 2017-09-04

## 2017-09-04 RX ADMIN — SPIRONOLACTONE 25 MG: 25 TABLET, FILM COATED ORAL at 08:52

## 2017-09-04 RX ADMIN — POTASSIUM CHLORIDE 20 MEQ: 20 TABLET, EXTENDED RELEASE ORAL at 08:52

## 2017-09-04 RX ADMIN — SACUBITRIL AND VALSARTAN 1 TABLET: 24; 26 TABLET, FILM COATED ORAL at 08:52

## 2017-09-04 RX ADMIN — ISOSORBIDE MONONITRATE 30 MG: 30 TABLET, EXTENDED RELEASE ORAL at 08:52

## 2017-09-04 RX ADMIN — CLOPIDOGREL BISULFATE 75 MG: 75 TABLET ORAL at 08:52

## 2017-09-04 RX ADMIN — IOPAMIDOL 85 ML: 755 INJECTION, SOLUTION INTRAVENOUS at 13:19

## 2017-09-04 RX ADMIN — ASPIRIN 81 MG 81 MG: 81 TABLET ORAL at 08:53

## 2017-09-04 RX ADMIN — CARVEDILOL 12.5 MG: 12.5 TABLET, FILM COATED ORAL at 08:53

## 2017-09-04 RX ADMIN — Medication 10 ML: at 14:02

## 2017-09-04 RX ADMIN — FUROSEMIDE 40 MG: 40 TABLET ORAL at 08:52

## 2017-09-04 RX ADMIN — POTASSIUM CHLORIDE 20 MEQ: 20 TABLET, EXTENDED RELEASE ORAL at 17:48

## 2017-09-04 RX ADMIN — PHENYLEPHRINE HYDROCHLORIDE 100 MCG: 10 INJECTION INTRAVENOUS at 13:00

## 2017-09-04 RX ADMIN — SODIUM CHLORIDE 75 ML/HR: 900 INJECTION, SOLUTION INTRAVENOUS at 09:01

## 2017-09-04 RX ADMIN — BIVALIRUDIN 1.75 MG/KG/HR: 250 INJECTION, POWDER, LYOPHILIZED, FOR SOLUTION INTRAVENOUS at 13:13

## 2017-09-04 RX ADMIN — ALUMINUM HYDROXIDE, MAGNESIUM HYDROXIDE, AND SIMETHICONE 30 ML: 200; 200; 20 SUSPENSION ORAL at 13:23

## 2017-09-04 RX ADMIN — MIDAZOLAM HYDROCHLORIDE 2 MG: 1 INJECTION, SOLUTION INTRAMUSCULAR; INTRAVENOUS at 12:52

## 2017-09-04 RX ADMIN — ATORVASTATIN CALCIUM 40 MG: 40 TABLET, FILM COATED ORAL at 08:53

## 2017-09-04 RX ADMIN — CLOPIDOGREL BISULFATE 600 MG: 300 TABLET, FILM COATED ORAL at 13:24

## 2017-09-04 RX ADMIN — LIDOCAINE HYDROCHLORIDE 60 MG: 20 INJECTION, SOLUTION INFILTRATION; PERINEURAL at 12:56

## 2017-09-04 RX ADMIN — HEPARIN SODIUM 2 ML: 10000 INJECTION, SOLUTION INTRAVENOUS; SUBCUTANEOUS at 12:58

## 2017-09-04 RX ADMIN — SODIUM CHLORIDE 250 ML: 900 INJECTION, SOLUTION INTRAVENOUS at 14:01

## 2017-09-04 RX ADMIN — HEPARIN SODIUM 3 ML/HR: 200 INJECTION, SOLUTION INTRAVENOUS at 12:45

## 2017-09-04 NOTE — PROCEDURES
Brief Cardiac Procedure Note    Patient: Milton Nobles MRN: [id number]  SSN: [id number]    YOB: 1945  Age: 81 y.o. Sex: male      Date of Procedure: 9/4/2017     Pre-procedure Diagnosis: NSTEMI    Post-procedure Diagnosis: Coronary Artery Disease    Procedure: Left Heart Catheterization with Percutaneous Coronary Intervention    Brief Description of Procedure: See note    Performed By: Dalton Castellanos MD     Assistants: None    Anesthesia: Moderate Sedation    Estimated Blood Loss: Less than 10 mL      Specimens: None    Implants: None    Findings:   LV:  EF 5%  LM:  NML  LAD:  100% mid - fills faintly with collaterals  LCx:  Stent in prox into OM2 patent with 40-50% distal stent edge, OM1 is jailed with 50% ostial  RCA:  Mid stent patent, distal stent 85% ISR, long 40-50% PDA    PCI dRCA 85-10%   3.0x15 NC Euphora    Complications: None    Recommendations: Continue medical therapy.     Signed By: Dalton Castellanos MD     September 4, 2017

## 2017-09-04 NOTE — PROGRESS NOTES
TRANSFER - OUT REPORT:    Verbal report given to [employer] Dal[employer]it, RN on Ra All  being transferred to [facility] for routine progression of care       Report consisted of patients Situation, Background, Assessment and Recommendations(SBAR). Information from the following report(s) SBAR, Kardex, Procedure Summary and MAR was reviewed with the receiving nurse. Opportunity for questions and clarification was provided.       [facility] with Dr Asad Singer  2 versed  angiomax stopped at 1325  600 plavix  30 mylanta  Right radial R band 8ml at 1315

## 2017-09-04 NOTE — PROGRESS NOTES
Problem: Falls - Risk of  Goal: *Absence of Falls  Document Rosa Maria Fall Risk and appropriate interventions in the flowsheet. Outcome: Progressing Towards Goal  Fall Risk Interventions:     Pt progressing towards goal. No falls since admission. Bed low and locked. Call light within reach. Side rails x 2. Gripper socks applied. Personal belongings within reach. Pt verbalizes understanding to call for assistance.             Medication Interventions: Patient to call before getting OOB

## 2017-09-04 NOTE — PROGRESS NOTES
Spiritual Care visit. Initial visit.  visited with patient and his friend in room. They seeme to be having a good time. ... Affirmed patient's health and condition. Prayed with the two for patient's healing and dylan.     Visit by Dorita Suero M.Ed., Th.B. ,Staff

## 2017-09-04 NOTE — PROGRESS NOTES
TRANSFER - OUT REPORT:    Verbal report given to cath lab, RN on Yann Howe  being transferred to cath lab for ordered procedure       Report consisted of patients Situation, Background, Assessment and Recommendations(SBAR). Information from the following report(s) SBAR, Intake/Output and MAR was reviewed with the receiving nurse. Opportunity for questions and clarification was provided.

## 2017-09-04 NOTE — PROGRESS NOTES
Radial compression band removed at 1842 after slowly reducing air from 8 cc to zero as per hospital protocol. No bleeding or hematoma noted. 2 x 2 gauze with tegaderm placed over puncture site. The affected extremity is warm and dry to the touch. Frequent vital signs documented per flowheet. Patient instructed to call if any bleeding noted on gauze. Patient verbalized understanding the nursing instructions.

## 2017-09-04 NOTE — PROGRESS NOTES
Bedside and Verbal shift change report given to Jordin Uribe Si (oncoming nurse) by self and Cindi Perrin RN (offgoing nurse). Report included the following information SBAR, Kardex, Procedure Summary, Intake/Output, MAR and Recent Results.

## 2017-09-04 NOTE — PROGRESS NOTES
Problem: Falls - Risk of  Goal: *Absence of Falls  Document Rosa Maria Fall Risk and appropriate interventions in the flowsheet.    Outcome: Progressing Towards Goal  Fall Risk Interventions:              Medication Interventions: Patient to call before getting OOB, Teach patient to arise slowly

## 2017-09-04 NOTE — PROCEDURES
[address]ella [address] 46       Name:  Joelle Puri   MR#:  [id number]   :  1945   Account #:  [de-identified]   Date of Adm:  2017       DATE OF PROCEDURE: 2017. PRIMARY CARDIOLOGIST: Dr. Carlos Salgado. PRIMARY CARE PHYSICIAN: Dr. Brittany Peres. CONSCIOUS SEDATION:     Start time 12:55 p.mm., end time 13:25 p.m. Frutoso Golder MEDICATIONS:  2 mg of Versed. MONITORING RN: Catia Meyers RN. BRIEF HISTORY: The patient is a 53-year-old gentleman with   history of ischemic cardiomyopathy. He has a history of coronary   artery disease with prior stenting of the left circumflex and   right coronary arteries. Reportedly, the LAD is chronically   occluded. Last heart catheterization was approximately 10 years   ago. No records are available to me at the time of the   procedure. The patient now is admitted with medical noncompliance, concern   for acute congestive heart failure, as well as abnormal   troponin. Referred for repeat cardiac catheterization. PROCEDURE: After informed consent, he was prepped and draped in   the usual sterile fashion. The right wrist infiltrated   with lidocaine. The right radial artery was accessed via   micropuncture technique. A 6-Canadian sheath was advanced without   complications. A 5-Canadian Tiger catheter was utilized for left   and right coronary injections. A 5-Canadian angled pigtail was   utilized for left ventriculography. Isovue contrast utilized. FINDINGS:   1. Left ventricle: Left ventriculogram not obtained. The patient   is hypotensive upon presentation, blood pressure in the 60s. EDP   is measured at 9. There is no aortic valve gradient. The patient   is given intravenous Senthil-Synephrine and IV fluids with   improvement in systolic blood pressure. Review of echocardiogram   demonstrates severely dilated left ventricular cavity with   severe LV systolic dysfunction, ejection fraction is 5%.  This   demonstrates a reduction over the last year when EF was   estimated at 15-20%. 2. Left main: Left main is moderate in size, bifurcates in the   LAD and circumflex system is angiographically normal.   3. Left anterior descending coronary: This is a moderate-sized   vessel. The LAD is occluded in the mid portion. There is a large   LAD septal system. The LAD and diagonal appear to fill by   collaterals. They are quite small and somewhat atretic in   nature. There is no identifiable origin off the LAD at the time   of his angioplasty. There are 3 large septals which primarily   originated off the LAD system. There is a small proximal   diagonal which remains patent. 4. Left circumflex coronary: This is a moderate-sized vessel. The proximal portion has been stented into the second obtuse   marginal. The first obtuse marginal has been jailed. There   is approximately 50% ostial first obtuse marginal. The distal   edge of the stent appears to have a 40-50% lesion present. 5. Right coronary: Large anatomically dominant vessel. It has a   stent in the mid portion which remains widely patent. There is a   stent in the distal portion which has a focal 85% eccentric   lesion present. It bifurcates into the posterior descending and   posterolateral branches. The posterior descending branch is a   long vessel, but diffusely diseased on the order of 50% to 60%. The posterolateral branch is a moderate-sized vessel. It has   minimal luminal irregularities. PERCUTANEOUS CORONARY INTERVENTION:     LESION: Distal right coronary artery. Prestenosis 85% Possis 10%. DETAILS: The patient was anticoagulated with Angiomax. A 6-  Western Sonja JR4 guide was utilized. A [place]ater wire was advanced   distally. The lesion was dilated with 3.0 x 15 NC Euphora   balloon on 2 separate occasions with near resolution of the   focal in-stent restenosis. No additional stenting was performed.    There is significant concern for medical noncompliance in this   patient. Successful hemostasis of pneumatic radial band. Clopidogrel 600 mg were administered in the lab. CONCLUSION:   1. Severe LV systolic dysfunction with an end-diastolic pressure   9 mmHg, which suggests the patient is likely volume depleted at   this time. 2. Chronically occluded mid LAD that appears to supply a   diagonal. The LAD diagonal is somewhat atretic in nature with   poor collateralization. I do not see any origin off the LAD   proper and the septals are patent. This appears to be chronic in   nature and recommend ongoing medical therapy. 3. The left circumflex remains patent. The first obtuse marginal   has been jailed and appears to have a 50% ostial stenosis. There   is also a focal 50% stenosis just distal to the stent that   extends into the second obtuse marginal. These will be managed   medically at this time. 4. Right coronary: The patient has 2 prior stents, one in the   mid and distal. There is an eccentric ulcerated-appearing the   area within the distal placed stent. This is status post balloon   angioplasty, with satisfactory angiographic result. He has   diffuse moderate pattern disease in the right posterior   descending and a relatively normal posterolateral branch. The   right coronary is the largest of his 3 epicardial vessels. RECOMMENDATIONS: Medical management for severe ischemic   cardiomyopathy. Given the patient's very poor ejection fraction,   I would suspect long-term prognosis is poor and would recommend   consideration of a more palliative care approach in the future. Thank you for allowing us to participate in care of this   patient. If questions or concerns, please feel free to contact   me. MD SARMAD Mcmullen / Rachid.[place]   D:  09/04/2017   13:29   T:  09/04/2017   14:01   Job #:  [id number]

## 2017-09-04 NOTE — PROGRESS NOTES
Bedside and Verbal shift change report given to Roberta Keys RN (oncoming nurse) by self Radha skelton). Report included the following information SBAR, Kardex, MAR and Recent Results.

## 2017-09-04 NOTE — PROGRESS NOTES
Bedside and Verbal shift change report given to self (oncoming nurse) by Yoni Santos RN (offgoing nurse). Report included the following information SBAR, Kardex, ED Summary, Intake/Output and MAR.

## 2017-09-04 NOTE — PROGRESS NOTES
Patient returned from cath lab with low blood pressure 80s/ 50s-60s, Dr. Rivera Hendrix gave orders to give bolus of normal saline 250 mL over 15 minutes. Blood pressure continued to be low and dip down to 60's/40's. Ricardo DE LA CRUZ was notified, another bolus order was given, 250 mL over 15 minutes, and coreg was held. Will continue to monitor. Blood pressure has been beginning to trend up 90s/ 70s.

## 2017-09-04 NOTE — PROGRESS NOTES
Pt returned from cath lab with BP of 73/56. Pt asymptomatic and resting quietly. Right radial site with TR band and WDL. Spoke with Dr. Zion Holguin and received verbal orders for NS 250mL bolus. Will continue to monitor pt.

## 2017-09-04 NOTE — PROGRESS NOTES
TRANSFER - IN REPORT:    Verbal report received from Rolly Oliveira RN on Unknown Exon being received from cath lab for routine post - op      Report consisted of patients Situation, Background, Assessment and Recommendations(SBAR). Information from the following report(s) SBAR, Kardex, Procedure Summary and Recent Results was reviewed with the receiving nurse. Opportunity for questions and clarification was provided. Assessment completed upon patients arrival to unit and care assumed. Patient returned to floor from cath lab, placed on eagle monitor. Right radial site assessed with cath lab nurse, site dry and intact with no bleeding and/or hematoma. Patient placed on telemetry monitor. Bed in low and locked position. Call light within reach.

## 2017-09-04 NOTE — PROGRESS NOTES
[facility] CARDIOLOGY PROGRESS NOTE      9/4/2017 8:51 AM    Admit Date: 9/3/2017    Admit Diagnosis: Pulmonary edema cardiac cause (HCC)      Subjective:   No chest pain or dyspnea. Objective:      Vitals:    09/03/17 1643 09/03/17 2000 09/04/17 0008 09/04/17 0406   BP: 97/75 110/62 96/67 98/63   Pulse: 71 70 72 (!) 56   Resp: 18 17 17 17   Temp: 97.4 °F (36.3 °C) 97.2 °F (36.2 °C) 97.5 °F (36.4 °C) 97.2 °F (36.2 °C)   SpO2: 94% 96% 97% 98%   Weight:    60.7 kg (133 lb 12.8 oz)   Height:           Physical Exam:  Neck-   CV- rr+r  Lungs- clear  Ext-  Skin- warm and dry        Data Review:   Recent Labs      09/04/17   0605  09/03/17   0158   NA  138  139   K  4.0  3.3*   BUN  16  6*   CREA  1.06  1.42   GLU  100  362*   WBC   --   14.9*   HGB   --   16.5   HCT   --   47.5   PLT   --   248       Assessment and Plan:     Principal Problem:    Pulmonary edema cardiac cause ([facility] [address] (9/3/2017)        troponin to . 21           hx of remote stenting ischemic CM        Cardiac cath procedure with risks [death,MI,stroke as well as bleeding risk ]  and options reviewed. Questions answered. Patient reviewed and  signed consent. Agreeable to  proceed. Active Problems:    Coronary atherosclerosis of native coronary artery (10/14/2009)      Ischemic cardiomyopathy (10/14/2009)      Mixed hyperlipidemia (10/14/2009)      HTN (hypertension), benign (10/14/2009)      Chronic systolic heart failure ([address] (5/17/2011)      AICD (automatic cardioverter/defibrillator) present (5/18/2016)      Acute hyperglycemia (9/3/2017)      Overview: secondary to respiratory and stress ?  new onset DM         Mariela Haines MD

## 2017-09-04 NOTE — CDMP QUERY
Please clarify if this patient is being treated/managed for:    ACUTE HYPOXIC RESPIRATORY FAILURE in the setting of pulmonary edema with tachypnea, ABG pH 7.25 and low O2 sats requiring treatment with BiPAP. =>Other Explanation of clinical findings  =>Unable to Determine (no explanation of clinical findings)    The medical record reflects the following:    Risk Factors: Pulmonary edmea    Clinical Indicators: Room air O2 sat 78% when EMS arrived, O2 sat 88% on CPAP when patient arrived to ED. Ref. Range 9/3/2017 02:15   pH Latest Ref Range: 7.35 - 7.45   7.25 (L)   PCO2 Latest Ref Range: 35 - 45 mmHg 44   PO2 Latest Ref Range: 80 - 105 mmHg 101   BICARBONATE Latest Ref Range: 22 - 26 mmol/L 19 (L)     Treatment: BiPAP    Please clarify and document your clinical opinion in the progress notes and discharge summary including the definitive and/or presumptive diagnosis, (suspected or probable), related to the above clinical findings. Please include clinical findings supporting your diagnosis.     Thanks,  Rafaela Weinstein RN, [address] [place] [address] Documentation Management Program  [phone]

## 2017-09-05 ENCOUNTER — HOSPICE ADMISSION (OUTPATIENT)
Dept: HOSPICE | Facility: HOSPICE | Age: 72
End: 2017-09-05

## 2017-09-05 VITALS
DIASTOLIC BLOOD PRESSURE: 66 MMHG | WEIGHT: 130.5 LBS | TEMPERATURE: 96.8 F | OXYGEN SATURATION: 97 % | HEART RATE: 54 BPM | SYSTOLIC BLOOD PRESSURE: 98 MMHG | BODY MASS INDEX: 19.33 KG/M2 | RESPIRATION RATE: 20 BRPM | HEIGHT: 69 IN

## 2017-09-05 LAB
ANION GAP SERPL CALC-SCNC: 8 MMOL/L (ref 7–16)
BASOPHILS # BLD: 0 K/UL (ref 0–0.2)
BASOPHILS NFR BLD: 0 % (ref 0–2)
BUN SERPL-MCNC: 19 MG/DL (ref 8–23)
CALCIUM SERPL-MCNC: 8.4 MG/DL (ref 8.3–10.4)
CHLORIDE SERPL-SCNC: 106 MMOL/L (ref 98–107)
CO2 SERPL-SCNC: 26 MMOL/L (ref 21–32)
CREAT SERPL-MCNC: 0.97 MG/DL (ref 0.8–1.5)
DIFFERENTIAL METHOD BLD: ABNORMAL
EOSINOPHIL # BLD: 0.1 K/UL (ref 0–0.8)
EOSINOPHIL NFR BLD: 1 % (ref 0.5–7.8)
ERYTHROCYTE [DISTWIDTH] IN BLOOD BY AUTOMATED COUNT: 14.5 % (ref 11.9–14.6)
GLUCOSE BLD STRIP.AUTO-MCNC: 86 MG/DL (ref 65–100)
GLUCOSE BLD STRIP.AUTO-MCNC: 99 MG/DL (ref 65–100)
GLUCOSE SERPL-MCNC: 81 MG/DL (ref 65–100)
HCT VFR BLD AUTO: 41.2 % (ref 41.1–50.3)
HGB BLD-MCNC: 14.7 G/DL (ref 13.6–17.2)
IMM GRANULOCYTES # BLD: 0 K/UL (ref 0–0.5)
IMM GRANULOCYTES NFR BLD: 0.2 % (ref 0–5)
LYMPHOCYTES # BLD: 2.1 K/UL (ref 0.5–4.6)
LYMPHOCYTES NFR BLD: 23 % (ref 13–44)
MCH RBC QN AUTO: 30.9 PG (ref 26.1–32.9)
MCHC RBC AUTO-ENTMCNC: 35.7 G/DL (ref 31.4–35)
MCV RBC AUTO: 86.7 FL (ref 79.6–97.8)
MONOCYTES # BLD: 0.9 K/UL (ref 0.1–1.3)
MONOCYTES NFR BLD: 10 % (ref 4–12)
NEUTS SEG # BLD: 6.1 K/UL (ref 1.7–8.2)
NEUTS SEG NFR BLD: 66 % (ref 43–78)
PLATELET # BLD AUTO: 181 K/UL (ref 150–450)
PMV BLD AUTO: 9.4 FL (ref 10.8–14.1)
POTASSIUM SERPL-SCNC: 4 MMOL/L (ref 3.5–5.1)
RBC # BLD AUTO: 4.75 M/UL (ref 4.23–5.67)
SODIUM SERPL-SCNC: 140 MMOL/L (ref 136–145)
WBC # BLD AUTO: 9.2 K/UL (ref 4.3–11.1)

## 2017-09-05 PROCEDURE — 36415 COLL VENOUS BLD VENIPUNCTURE: CPT | Performed by: INTERNAL MEDICINE

## 2017-09-05 PROCEDURE — 80048 BASIC METABOLIC PNL TOTAL CA: CPT | Performed by: INTERNAL MEDICINE

## 2017-09-05 PROCEDURE — 76450000000

## 2017-09-05 PROCEDURE — 74011250637 HC RX REV CODE- 250/637: Performed by: INTERNAL MEDICINE

## 2017-09-05 PROCEDURE — 85025 COMPLETE CBC W/AUTO DIFF WBC: CPT | Performed by: INTERNAL MEDICINE

## 2017-09-05 PROCEDURE — 82962 GLUCOSE BLOOD TEST: CPT

## 2017-09-05 RX ORDER — CARVEDILOL 3.12 MG/1
3.12 TABLET ORAL 2 TIMES DAILY WITH MEALS
Qty: 60 TAB | Refills: 6 | Status: SHIPPED | OUTPATIENT
Start: 2017-09-05 | End: 2017-10-04

## 2017-09-05 RX ORDER — ISOSORBIDE MONONITRATE 30 MG/1
30 TABLET, EXTENDED RELEASE ORAL DAILY
Qty: 30 TAB | Refills: 6 | Status: SHIPPED | OUTPATIENT
Start: 2017-09-05 | End: 2017-10-04

## 2017-09-05 RX ADMIN — ATORVASTATIN CALCIUM 40 MG: 40 TABLET, FILM COATED ORAL at 09:05

## 2017-09-05 RX ADMIN — ISOSORBIDE MONONITRATE 30 MG: 30 TABLET, EXTENDED RELEASE ORAL at 09:05

## 2017-09-05 RX ADMIN — SPIRONOLACTONE 25 MG: 25 TABLET, FILM COATED ORAL at 09:05

## 2017-09-05 RX ADMIN — CARVEDILOL 3.12 MG: 3.12 TABLET, FILM COATED ORAL at 09:05

## 2017-09-05 RX ADMIN — SACUBITRIL AND VALSARTAN 1 TABLET: 24; 26 TABLET, FILM COATED ORAL at 09:05

## 2017-09-05 RX ADMIN — CLOPIDOGREL BISULFATE 75 MG: 75 TABLET ORAL at 09:05

## 2017-09-05 RX ADMIN — ASPIRIN 81 MG 81 MG: 81 TABLET ORAL at 09:05

## 2017-09-05 NOTE — PROGRESS NOTES
Bedside and Verbal shift change report given to Neo maldonado RN (oncoming nurse) [facility] AT [facility] RN (offgoing nurse). Report included the following information SBAR, Kardex and Recent Results.

## 2017-09-05 NOTE — PROGRESS NOTES
Spiritual Care visit. Follow up visit.  visited patient in room with family/friends around him. He is being discharged, and is dressed to go home. Patient is 81years of age, of healthy appearance, but his code status is DNR. He is considering, but may not yet be ready to enter hospice care. This  is disturbed that such a man would be at this juncture in life.  prayed for his health, and for good outcome to his case.     Visit by Luis Gipson M.Ed., Th.B. ,Staff

## 2017-09-05 NOTE — PROGRESS NOTES
Discharge instructions reviewed with patient and family at bedside. Prescriptions given for entresto, Imdur and Coreg, med info sheets provided for all new medications. Opportunity for questions provided. Patient and present family voiced understanding of all discharge instructions. Reviewed post cath procedures. Gave prescription for BMP. IV's and monitor removed at discharge.

## 2017-09-05 NOTE — DISCHARGE INSTRUCTIONS
Sacubitril/Valsartan Janiya Mckeon) - (By mouth)   Why this medicine is used:   Treats chronic heart failure. Contact a nurse or doctor right away if you have:  · Itching or hives, swelling in your face or hands, swelling or tingling in your mouth or throat, chest tightness, trouble breathing  · Uneven heartbeat, trouble breathing  · Confusion, weakness, numbness in your hands, feet, or lips  · Decrease in how much or how often you urinate, bloody or cloudy urine  · Lightheadedness, dizziness, or fainting  · Lower back or side pain   © 2017 [address] is for End User's use only and may not be sold, redistributed or otherwise used for commercial purposes. Heart Failure: Care Instructions  Your Care Instructions    Heart failure occurs when your heart does not pump as much blood as the body needs. Failure does not mean that the heart has stopped pumping but rather that it is not pumping as well as it should. Over time, this causes fluid buildup in your lungs and other parts of your body. Fluid buildup can cause shortness of breath, fatigue, swollen ankles, and other problems. By taking medicines regularly, reducing sodium (salt) in your diet, checking your weight every day, and making lifestyle changes, you can feel better and live longer. Follow-up care is a key part of your treatment and safety. Be sure to make and go to all appointments, and call your doctor if you are having problems. It's also a good idea to know your test results and keep a list of the medicines you take. How can you care for yourself at home? Medicines  · Be safe with medicines. Take your medicines exactly as prescribed. Call your doctor if you think you are having a problem with your medicine.   · Do not take any vitamins, over-the-counter medicine, or herbal products without talking to your doctor first. Mae Hurt not take ibuprofen (Advil or Motrin) and naproxen (Aleve) without talking to your doctor first. They could make your heart failure worse. · You may be taking some of the following medicine. ¨ Beta-blockers can slow heart rate, decrease blood pressure, and improve your condition. Taking a beta-blocker may lower your chance of needing to be hospitalized. ¨ Angiotensin-converting enzyme inhibitors (ACEIs) reduce the heart's workload, lower blood pressure, and reduce swelling. Taking an ACEI may lower your chance of needing to be hospitalized again. ¨ Angiotensin II receptor blockers (ARBs) work like ACEIs. Your doctor may prescribe them instead of ACEIs. ¨ Diuretics, also called water pills, reduce swelling. ¨ Potassium supplements replace this important mineral, which is sometimes lost with diuretics. ¨ Aspirin and other blood thinners prevent blood clots, which can cause a stroke or heart attack. You will get more details on the specific medicines your doctor prescribes. Diet  · Your doctor may suggest that you limit sodium to 2,000 milligrams (mg) a day or less. That is less than 1 teaspoon of salt a day, including all the salt you eat in cooking or in packaged foods. People get most of their sodium from processed foods. Fast food and restaurant meals also tend to be very high in sodium. · Ask your doctor how much liquid you can drink each day. You may have to limit liquids. Weight  · Weigh yourself without clothing at the same time each day. Record your weight. Call your doctor if you have a sudden weight gain, such as more than 2 to 3 pounds in a day or 5 pounds in a week. (Your doctor may suggest a different range of weight gain.) A sudden weight gain may mean that your heart failure is getting worse. Activity level  · Start light exercise (if your doctor says it is okay). Even if you can only do a small amount, exercise will help you get stronger, have more energy, and manage your weight and your stress. Walking is an easy way to get exercise. Start out by walking a little more than you did before. Bit by bit, increase the amount you walk. · When you exercise, watch for signs that your heart is working too hard. You are pushing yourself too hard if you cannot talk while you are exercising. If you become short of breath or dizzy or have chest pain, stop, sit down, and rest.  · If you feel \"wiped out\" the day after you exercise, walk slower or for a shorter distance until you can work up to a better pace. · Get enough rest at night. Sleeping with 1 or 2 pillows under your upper body and head may help you breathe easier. Lifestyle changes  · Do not smoke. Smoking can make a heart condition worse. If you need help quitting, talk to your doctor about stop-smoking programs and medicines. These can increase your chances of quitting for good. Quitting smoking may be the most important step you can take to protect your heart. · Limit alcohol to 2 drinks a day for men and 1 drink a day for women. Too much alcohol can cause health problems. · Avoid getting sick from colds and the flu. Get a pneumococcal vaccine shot. If you have had one before, ask your doctor whether you need another dose. Get a flu shot each year. If you must be around people with colds or the flu, wash your hands often. When should you call for help? Call 911 if you have symptoms of sudden heart failure such as:  · You have severe trouble breathing. · You cough up pink, foamy mucus. · You have a new irregular or rapid heartbeat. Call your doctor now or seek immediate medical care if:  · You have new or increased shortness of breath. · You are dizzy or lightheaded, or you feel like you may faint. · You have sudden weight gain, such as more than 2 to 3 pounds in a day or 5 pounds in a week. (Your doctor may suggest a different range of weight gain.)  · You have increased swelling in your legs, ankles, or feet. · You are suddenly so tired or weak that you cannot do your usual activities.   Watch closely for changes in your health, and be sure to contact your doctor if:  · You develop new symptoms. Where can you learn more? Go to http://raj-savannah.info/. Enter F987 in the search box to learn more about \"Heart Failure: Care Instructions. \"  Current as of: April 3, 2017  Content Version: 11.3  © 6565-6950 [employer]. Care instructions adapted under license by [employer] (which disclaims liability or warranty for this information). If you have questions about a medical condition or this instruction, always ask your healthcare professional. [place] [address] any warranty or liability for your use of this information. Cardiac Catheterization/Angiography Discharge Instructions    *Check the puncture site frequently for swelling or bleeding. If you see any bleeding, lie down and apply pressure over the area with a clean town or washcloth. Notify your doctor for any redness, swelling, drainage or oozing from the puncture site. Notify your doctor for any fever or chills. *If the leg or arm with the puncture becomes cold, numb or painful, call Dr Cb Nguyen at [phone]    *Activity should be limited for the next 48 hours. Climb stairs as little as possible and avoid any stooping, bending or strenuous activity for 48 hours. No heavy lifting (anything over 10 pounds) for three days. *Do not drive for 48 hours. *You may resume your usual diet. Drink more fluids than usual.    *Have a responsible person drive you home and stay with you for at least 24 hours after your heart catheterization/angiography. *You may remove the bandage from your Groin in 24 hours. You may shower in 24 hours. No tub baths, hot tubs or swimming for one week. Do not place any lotions, creams, powders, ointments over the puncture site for one week. You may place a clean band-aid over the puncture site each day for 5 days. Change this daily.

## 2017-09-05 NOTE — CONSULTS
Palliative Care    Patient: Dhara Mckays MRN: [id number]  SSN: [id number]    YOB: 1945  Age: 81 y.o. Sex: male       Date of Request: 2017  Date of Consult:  2017  Reason for Consult:  advanced care plan planning/end of life  Requesting Physician: Dr Kohli Husbands     Assessment/Plan:     Principal Diagnosis:    Fatigue, Lethargy  R53.83    Additional Diagnoses:   · Dyspnea  R06.00  · Counseling, Encounter for Medical Advice  Z71.9  · Encounter for Palliative Care  Z51.5    Palliative Performance Scale (PPS):  PPS: 79    Medical Decision Making:   Reviewed and summarized notes from admission to present   Discussed case with appropriate providers  Reviewed laboratory and x-ray data from admission to present    Pt resting in bed, no distress noted. Friend at bedside. Introduced role of PC and reviewed events. Pt reports he has one blockage in his arteries, and a weak heart. Counseled on the progression of his CHF, and current EF of 5%. Pt was shocked to hear this, and asked how long he had to live. Advised that his is difficult to predict, but would appear that his CHF was at end stage. We discussed his goals, knowing this. Pt states he wants to be home, and not in the hospital.  He has a supportive family and friends, and feels home is the best option. He is adamant he is never going to a facility. Offered home health and hospice options. Pt relayed that he had a very bad experience with Hospice when his mother . He reluctantly agreed to a presentation to discuss home option. We also discussed his wishes regarding life support and resuscitation- he requests DNR status. Order placed. Hospice consulted for presentation. Will continue to follow.     Will discuss findings with members of the interdisciplinary team.      Thank you for this referral.         ,    Subjective:     History obtained from:  Patient, Family and Chart    Chief Complaint: CHF  History of Present Illness:   Sangita Klein is a 82 yo  male with PMH of CAD, CHF, GERd, HTN, and HLD, who presented to the ER from home on 9/3/2017 with c/o acute onset dyspnea and mild chest tightness. Pt denied n/v/d, fevers, cough. Pt did report he had not had lasix in several days. Work up in the ER revealed CHF exacerbation, and pt was admitted for further management. Pt underwent LHC on 9/4, revealing EF of 5%, and angioplasty of the RCA. Pt reports he is feeling better today. Advance Directive: No       Code Status:  DNR            Health Care Power of : No - Patient does not have a [address]. Past Medical History:   Diagnosis Date    AICD (automatic cardioverter/defibrillator) present 5/18/2016    Arrhythmia     irregular    CAD (coronary artery disease)     stent    Chronic systolic heart failure ([facility] [address]     Coronary atherosclerosis of native coronary artery     GERD (gastroesophageal reflux disease)     Hypertension     Ischemic cardiomyopathy     Tobacco use disorder 5/18/2016      Past Surgical History:   Procedure Laterality Date    CARDIAC SURG PROCEDURE UNLIST      6 stents; last one put in 2 years ago october 2009    HX HEART CATHETERIZATION      2009 october    HX OTHER SURGICAL      scrotum     Family History   Problem Relation Age of Onset    Heart Disease Father     Cancer Sister       Social History   Substance Use Topics    Smoking status: Light Tobacco Smoker     Packs/day: 0.10     Years: 20.00     Last attempt to quit: 5/6/2011    Smokeless tobacco: Never Used    Alcohol use Yes      Comment: occasionally     Prior to Admission medications    Medication Sig Start Date End Date Taking? Authorizing Provider   carvedilol (COREG) 12.5 mg tablet Take 1 Tab by mouth two (2) times daily (with meals). 3/22/17  Yes Mayra Ruffin MD   spironolactone (ALDACTONE) 25 mg tablet Take 1 Tab by mouth daily.  3/22/17  Yes Mayra Ruffin MD   atorvastatin (LIPITOR) 40 mg tablet Take 1 Tab by mouth daily. 3/22/17  Yes Teddy Bucio MD   enalapril (VASOTEC) 5 mg tablet Take 1 Tab by mouth daily. 3/22/17  Yes Teddy Bucio MD   aspirin 81 mg chewable tablet Take 1 Tab by mouth daily. 9/15/16  Yes Derick Pugh NP   clopidogrel (PLAVIX) 75 mg tablet Take 75 mg by mouth daily. Last dose 5-10-11. Historical Provider       No Known Allergies     Review of Systems:  A comprehensive review of systems was negative except for:   Constitutional: Positive for fatigue. Cardiovascular: Positive for improving dyspnea      Objective:     Visit Vitals    /67    Pulse (!) 56    Temp 97.8 °F (36.6 °C)    Resp 18    Ht 5' 9\" (1.753 m)    Wt 59.2 kg (130 lb 8 oz)    SpO2 96%    BMI 19.27 kg/m2        Physical Exam:    General:  Cooperative. No acute distress. Eyes:  Conjunctivae/corneas clear    Nose: Nares normal. Septum midline. Neck: Supple, symmetrical, trachea midline   Lungs:   Clear to auscultation bilaterally, unlabored    Heart:  Regular rate and rhythm   Abdomen:   Soft, non-tender, non-distended   Extremities: Normal, atraumatic, no cyanosis or edema   Skin: Skin color, texture, turgor normal.   Neurologic: Nonfocal   Psych: Alert and oriented      Assessment:     Hospital Problems  Date Reviewed: 8/18/2017          Codes Class Noted POA    * (Principal)Pulmonary edema cardiac cause ([facility] [address] ICD-10-CM: I50.1  ICD-9-CM: 646  9/3/2017 Unknown        Acute hyperglycemia ICD-10-CM: R73.9  ICD-9-CM: 790.29  9/3/2017 Unknown    Overview Signed 9/3/2017  6:42 AM by Pavan Tovar MD     secondary to respiratory and stress ?  new onset DM              AICD (automatic cardioverter/defibrillator) present ICD-10-CM: Z95.810  ICD-9-CM: V45.02  5/18/2016 Yes        Chronic systolic heart failure (HCC) ICD-10-CM: I50.22  ICD-9-CM: 428.22  5/17/2011 Yes        Coronary atherosclerosis of native coronary artery (Chronic) ICD-10-CM: I25.10  ICD-9-CM: 414.01  10/14/2009 Yes        Ischemic cardiomyopathy (Chronic) ICD-10-CM: I25.5  ICD-9-CM: 414.8  10/14/2009 Yes        Mixed hyperlipidemia (Chronic) ICD-10-CM: L81.4  ICD-9-CM: 272.2  10/14/2009 Yes        HTN (hypertension), benign (Chronic) ICD-10-CM: I10  ICD-9-CM: 401.1  10/14/2009 Yes              Signed By: Rivera Servin NP     September 5, 2017

## 2017-09-05 NOTE — PROGRESS NOTES
Problem: Falls - Risk of  Goal: *Absence of Falls  Document Rosa Maria Fall Risk and appropriate interventions in the flowsheet.    Outcome: Progressing Towards Goal  Fall Risk Interventions:  Medication Interventions: Patient to call before getting OOB

## 2017-09-05 NOTE — PROGRESS NOTES
9/5/2017 6:51 AM    Admit Date: 9/3/2017    Admit Diagnosis: Pulmonary edema cardiac cause (HCC)      Subjective:    Patient with end stage ischemic cmp. Feeling better. Needs palliative care planning.  Needs arb or entrestro added    Objective:      Visit Vitals    /74    Pulse 65    Temp 97 °F (36.1 °C)    Resp 18    Ht 5' 9\" (1.753 m)    Wt 59.2 kg (130 lb 8 oz)    SpO2 96%    BMI 19.27 kg/m2       ROS:  General ROS: negative for - chills  Hematological and Lymphatic ROS: negative for - blood clots or jaundice  Respiratory ROS: no cough, shortness of breath, or wheezing  Cardiovascular ROS: no chest pain or dyspnea on exertion  Gastrointestinal ROS: no abdominal pain, change in bowel habits, or black or bloody stools  Neurological ROS: no TIA or stroke symptoms    Physical Exam:    Physical Examination: General appearance - alert, well appearing, and in no distress  Mental status - alert, oriented to person, place, and time  Eyes - pupils equal and reactive, extraocular eye movements intact  Neck/lymph - supple, no significant adenopathy  Chest/CV - clear to auscultation, no wheezes, rales or rhonchi, symmetric air entry  Heart - normal rate, regular rhythm, normal S1, S2, no murmurs, rubs, clicks or gallops  Abdomen/GI - soft, nontender, nondistended, no masses or organomegaly  Musculoskeletal - no joint tenderness, deformity or swelling  Extremities - peripheral pulses normal, no pedal edema, no clubbing or cyanosis  Skin - normal coloration and turgor, no rashes, no suspicious skin lesions noted    Current Facility-Administered Medications   Medication Dose Route Frequency    0.9% sodium chloride infusion  75 mL/hr IntraVENous CONTINUOUS    0.9% sodium chloride infusion  75 mL/hr IntraVENous CONTINUOUS    sodium chloride (NS) flush 5-10 mL  5-10 mL IntraVENous Q8H    sodium chloride (NS) flush 5-10 mL  5-10 mL IntraVENous PRN    acetaminophen (TYLENOL) tablet 650 mg  650 mg Oral Q4H PRN    nitroglycerin (NITROSTAT) tablet 0.4 mg  0.4 mg SubLINGual Q5MIN PRN    aspirin chewable tablet 81 mg  81 mg Oral DAILY    LORazepam (ATIVAN) tablet 1 mg  1 mg Oral Q6H PRN    HYDROcodone-acetaminophen (NORCO) 5-325 mg per tablet 1 Tab  1 Tab Oral Q4H PRN    carvedilol (COREG) tablet 3.125 mg  3.125 mg Oral BID WITH MEALS    clopidogrel (PLAVIX) tablet 75 mg  75 mg Oral DAILY    oxyCODONE-acetaminophen (PERCOCET) 5-325 mg per tablet 1 Tab  1 Tab Oral Q4H PRN    spironolactone (ALDACTONE) tablet 25 mg  25 mg Oral DAILY    atorvastatin (LIPITOR) tablet 40 mg  40 mg Oral DAILY    furosemide (LASIX) tablet 40 mg  40 mg Oral DAILY    enoxaparin (LOVENOX) injection 40 mg  40 mg SubCUTAneous Q24H    temazepam (RESTORIL) capsule 15 mg  15 mg Oral QHS PRN    isosorbide mononitrate ER (IMDUR) tablet 30 mg  30 mg Oral DAILY    insulin lispro (HUMALOG) injection   SubCUTAneous AC&HS       Data Review:   @LABRCNT(Na,K,BUN,CREA,WBC,HGB,HCT,PLT,INR,TRP,TCHOL*,Triglyceride*,LDL*,LDLCPOC HDL*,HDL])@    TELEMETRY: nsr    Assessment/Plan:     Principal Problem:    Pulmonary edema cardiac cause (HCC) (9/3/2017)    Active Problems:    Coronary atherosclerosis of native coronary artery (10/14/2009) The current medical regimen is effective;  continue present plan and medications. Ischemic cardiomyopathy (10/14/2009)      Mixed hyperlipidemia (10/14/2009)      HTN (hypertension), benign (10/14/2009)      Chronic systolic heart failure ([facility] [address] (5/17/2011)end stage. Will add entrestro      AICD (automatic cardioverter/defibrillator) present (5/18/2016)      Acute hyperglycemia (9/3/2017)      Overview: secondary to respiratory and stress ?  new onset DM     Palliative care consult as part of discharge planning      Rosario Mejia MD

## 2017-09-05 NOTE — PROGRESS NOTES
Verbal bedside report received from Patience Byrne RN and Payton Caballero RN. Assumed care of patient.

## 2017-09-05 NOTE — PROGRESS NOTES
Care Management Interventions  Mode of Transport at Discharge:  (family)  Transition of Care Consult (CM Consult): Discharge Planning (Pt has been recommended for in home hospice care and had [facility] presentation prio to his D/C home today.)  Discharge Durable Medical Equipment: No  Physical Therapy Consult: No  Current Support Network: Own Home, Family Lives Nearby  Confirm Follow Up Transport: Family  Plan discussed with Pt/Family/Caregiver: Yes  Freedom of Choice Offered: Yes  Discharge Location  Discharge Placement: Home (LMSW spoke with pt today about care planning. He is still adjusting to the news about the severity of his heart condition. [facility] presentation today prio to discharge and pt will contact them from home if he decides to proceed with services.)   Pt still adjusting to news about his heart condition. He had an unpleasant experience with In pt hospice care provided for his Mother at a hospice house not operated by [facility]. He was eager to go home and agreed to an [address] presentation prior to his D/C. He has their contact information to follow up with them about services after he processes all the information he received today. Offered support and understanding for his concerns.

## 2017-09-05 NOTE — DISCHARGE SUMMARY
Physician Discharge Summary     Patient ID:  Alexey Moreland  [id number]  81 y.o.  1945    Admit date: 9/3/2017    Discharge date and time: 9/5/17    Admitting Physician: Pepito Carrillo MD     Primary Cardiologist:[facility]     Primary Care MD:Werner Clancy MD    Discharge Physician: Móncia Magana NP    Admission Diagnoses: Pulmonary edema cardiac cause [facility])    Discharge Diagnoses:   Patient Active Problem List    Diagnosis Date Noted    Pulmonary edema cardiac cause ([facility] [address] 09/03/2017    Acute hyperglycemia 09/03/2017    ICD (implantable cardioverter-defibrillator) discharge 09/13/2016    AICD (automatic cardioverter/defibrillator) present 05/18/2016    Chronic systolic heart failure ([facility] [address] 05/17/2011    Coronary atherosclerosis of native coronary artery 10/14/2009    Ischemic cardiomyopathy 10/14/2009    Mixed hyperlipidemia 10/14/2009    HTN (hypertension), benign 10/14/2009           Hospital Course: Alexey Moreland was admitted to the hospital on 9/3/2017 with complaints of shortness of breath and edema. He has known history of ischemic cardiomyopathy, poor EF with ICD in place. He was admitted to telemetry for IV diuresis and re-initiation of medications--concern for ? Adherence. Cardiac enzymes were elevated and cardiac cath noted the following--He will continue with DAPT of aspirin and plavix. Echocardiogram noted continued severe ischemic CM with documented EF 15%. He was seen in consultation by Palliative care to discuss goals of care. He was felt not to be candidate for PCI. His coreg was decreased and vasotec was stopped due to relative Hypotension and entresto was started. He will need follow up appt and bloodwork in one week. Consideration for having his ICD therapies stopped in follow up. He  It was discussed with patient the importance of dual platelet therapy, to take this every day and monitor stools for signs and symptoms of GI bleed.  Report to us or PCP any dark tarry stools or dex blood in stool. DISPOSITION: The patient is being discharged to home on a low saturated fat, low cholesterol diet. Pt is instructed to abstain from any heavy lifting, straining, stooping or driving for 5 days. Pt is instructed to watch groin site ( if groin access was performed) for bleeding/oozing. If so,pt is instructed to apply firm pressure with clean cloth and call office at [phone]. Pt is instructed to watch for signs of infection which include increasing area of redness around site, fever/hot to touch or purulent drainage. Pt is instructed not to soak in a tub bath for 1 week, but it is okay to shower. Discharge Exam:     Visit Vitals    BP 98/66    Pulse (!) 54    Temp 96.8 °F (36 °C)    Resp 20    Ht 5' 9\" (1.753 m)    Wt 59.2 kg (130 lb 8 oz)    SpO2 97%    BMI 19.27 kg/m2     General Appearance:  Well developed, well nourished,alert and oriented x 3, and individual in no acute distress. Ears/Nose/Mouth/Throat:   Hearing grossly normal.         Neck: Supple. Chest:   Lungs clear to auscultation bilaterally. Cardiovascular:  Regular rate and rhythm, S1, S2 normal, no murmur. Abdomen:   Soft, non-tender, bowel sounds are active. Extremities: No edema bilaterally. Skin: Warm and dry.                  Final Laboratory Data:Recent Results (from the past 24 hour(s))   EKG, 12 LEAD, INITIAL    Collection Time: 09/04/17  2:08 PM   Result Value Ref Range    Ventricular Rate 57 BPM    Atrial Rate 57 BPM    P-R Interval 158 ms    QRS Duration 110 ms    Q-T Interval 498 ms    QTC Calculation (Bezet) 484 ms    Calculated P Axis 61 degrees    Calculated R Axis 27 degrees    Calculated T Axis 142 degrees    Diagnosis       Sinus bradycardia  Incomplete left bundle branch block  OIMI persistent ST elevation rate has slowed     Confirmed by ESTIVEN NGUYEN (), Adidson Carbajal ([id number]) on 9/4/2017 10:37:00 PM     GLUCOSE, POC    Collection Time: 09/04/17  4:28 PM   Result Value Ref Range Glucose (POC) 121 (H) 65 - 100 mg/dL   GLUCOSE, POC    Collection Time: 09/04/17  9:33 PM   Result Value Ref Range    Glucose (POC) 89 65 - 029 mg/dL   METABOLIC PANEL, BASIC    Collection Time: 09/05/17  6:16 AM   Result Value Ref Range    Sodium 140 136 - 145 mmol/L    Potassium 4.0 3.5 - 5.1 mmol/L    Chloride 106 98 - 107 mmol/L    CO2 26 21 - 32 mmol/L    Anion gap 8 7 - 16 mmol/L    Glucose 81 65 - 100 mg/dL    BUN 19 8 - 23 MG/DL    Creatinine 0.97 0.8 - 1.5 MG/DL    GFR est AA >60 >60 ml/min/1.73m2    GFR est non-AA >60 >60 ml/min/1.73m2    Calcium 8.4 8.3 - 10.4 MG/DL   CBC WITH AUTOMATED DIFF    Collection Time: 09/05/17  6:16 AM   Result Value Ref Range    WBC 9.2 4.3 - 11.1 K/uL    RBC 4.75 4.23 - 5.67 M/uL    HGB 14.7 13.6 - 17.2 g/dL    HCT 41.2 41.1 - 50.3 %    MCV 86.7 79.6 - 97.8 FL    MCH 30.9 26.1 - 32.9 PG    MCHC 35.7 (H) 31.4 - 35.0 g/dL    RDW 14.5 11.9 - 14.6 %    PLATELET 974 487 - 244 K/uL    MPV 9.4 (L) 10.8 - 14.1 FL    DF AUTOMATED      NEUTROPHILS 66 43 - 78 %    LYMPHOCYTES 23 13 - 44 %    MONOCYTES 10 4.0 - 12.0 %    EOSINOPHILS 1 0.5 - 7.8 %    BASOPHILS 0 0.0 - 2.0 %    IMMATURE GRANULOCYTES 0.2 0.0 - 5.0 %    ABS. NEUTROPHILS 6.1 1.7 - 8.2 K/UL    ABS. LYMPHOCYTES 2.1 0.5 - 4.6 K/UL    ABS. MONOCYTES 0.9 0.1 - 1.3 K/UL    ABS. EOSINOPHILS 0.1 0.0 - 0.8 K/UL    ABS. BASOPHILS 0.0 0.0 - 0.2 K/UL    ABS. IMM. GRANS. 0.0 0.0 - 0.5 K/UL   GLUCOSE, POC    Collection Time: 09/05/17  6:24 AM   Result Value Ref Range    Glucose (POC) 86 65 - 100 mg/dL   GLUCOSE, POC    Collection Time: 09/05/17 10:45 AM   Result Value Ref Range    Glucose (POC) 99 65 - 100 mg/dL       Disposition: home    Patient Instructions:   Current Discharge Medication List      START taking these medications    Details   isosorbide mononitrate ER (IMDUR) 30 mg tablet Take 1 Tab by mouth daily.   Qty: 30 Tab, Refills: 6      sacubitril-valsartan (ENTRESTO) 24 mg/26 mg tablet Take 1 Tab by mouth every twelve (12) hours.  Qty: 60 Tab, Refills: 6         CONTINUE these medications which have CHANGED    Details   carvedilol (COREG) 3.125 mg tablet Take 1 Tab by mouth two (2) times daily (with meals). Qty: 60 Tab, Refills: 6         CONTINUE these medications which have NOT CHANGED    Details   spironolactone (ALDACTONE) 25 mg tablet Take 1 Tab by mouth daily. Qty: 90 Tab, Refills: 3      atorvastatin (LIPITOR) 40 mg tablet Take 1 Tab by mouth daily. Qty: 90 Tab, Refills: 3      aspirin 81 mg chewable tablet Take 1 Tab by mouth daily. Qty: 30 Tab, Refills: 11      clopidogrel (PLAVIX) 75 mg tablet Take 75 mg by mouth daily. Last dose 5-10-11. STOP taking these medications       enalapril (VASOTEC) 5 mg tablet Comments:   Reason for Stopping:         oxycodone-acetaminophen (PERCOCET) 5-325 mg per tablet Comments:   Reason for Stopping:               Referenced discharge instructions provided by nursing for diet and activity. Follow-up:  Primary [place] in one week  PCP: (Justin Oliva MD) in about 4 weeks.     Signed:  Penny Mcdaniel NP  9/5/2017  1:58 PM

## 2017-09-05 NOTE — HOSPICE
Met with patient in room for hospice presentation. Shared hospice philosophy, services, symptom and medication management. Patient receptive of information but not ready to make a decision today. Brochures and contact numbers left. Patient given education on how to contact us once he is home if hospice care is what he wants to do.   Thank you for this referral.  TOOTIE Chavez Nurse Liaison  [facility]  [phone]

## 2018-01-10 PROBLEM — I75.022 BLUE TOE SYNDROME OF LEFT LOWER EXTREMITY (HCC): Status: ACTIVE | Noted: 2018-01-10

## 2018-01-10 PROBLEM — I96 BLACK TOE (HCC): Status: ACTIVE | Noted: 2018-01-10

## 2018-01-10 PROBLEM — I96 BLACK TOE (HCC): Status: RESOLVED | Noted: 2018-01-10 | Resolved: 2018-01-10

## 2018-01-10 PROBLEM — K40.90 UNILATERAL INGUINAL HERNIA: Status: ACTIVE | Noted: 2018-01-10

## 2018-10-16 ENCOUNTER — HOSPITAL ENCOUNTER (INPATIENT)
Age: 73
LOS: 5 days | Discharge: HOME | DRG: 309 | End: 2018-10-21
Attending: INTERNAL MEDICINE | Admitting: INTERNAL MEDICINE
Payer: MEDICARE

## 2018-10-16 DIAGNOSIS — I25.118 ATHEROSCLEROSIS OF NATIVE CORONARY ARTERY OF NATIVE HEART WITH STABLE ANGINA PECTORIS (HCC): Chronic | ICD-10-CM

## 2018-10-16 DIAGNOSIS — I47.20 VT (VENTRICULAR TACHYCARDIA): Primary | ICD-10-CM

## 2018-10-16 PROBLEM — I48.91 ATRIAL FIBRILLATION WITH RVR (HCC): Status: ACTIVE | Noted: 2018-10-16

## 2018-10-16 PROBLEM — E87.6 HYPOKALEMIA: Status: ACTIVE | Noted: 2018-10-16

## 2018-10-16 LAB
ANION GAP SERPL CALC-SCNC: 10 MMOL/L (ref 7–16)
APTT PPP: 62.3 SEC (ref 23.2–35.3)
BNP SERPL-MCNC: 1512 PG/ML
BUN SERPL-MCNC: 10 MG/DL (ref 8–23)
CALCIUM SERPL-MCNC: 8 MG/DL (ref 8.3–10.4)
CHLORIDE SERPL-SCNC: 109 MMOL/L (ref 98–107)
CO2 SERPL-SCNC: 21 MMOL/L (ref 21–32)
CREAT SERPL-MCNC: 1.05 MG/DL (ref 0.8–1.5)
GLUCOSE SERPL-MCNC: 118 MG/DL (ref 65–100)
POTASSIUM SERPL-SCNC: 3.7 MMOL/L (ref 3.5–5.1)
SODIUM SERPL-SCNC: 140 MMOL/L (ref 136–145)
TROPONIN I SERPL-MCNC: 1.77 NG/ML (ref 0.02–0.05)
TROPONIN I SERPL-MCNC: 4.04 NG/ML (ref 0.02–0.05)

## 2018-10-16 PROCEDURE — 80048 BASIC METABOLIC PNL TOTAL CA: CPT

## 2018-10-16 PROCEDURE — 65620000000 HC RM CCU GENERAL

## 2018-10-16 PROCEDURE — 74011250637 HC RX REV CODE- 250/637: Performed by: NURSE PRACTITIONER

## 2018-10-16 PROCEDURE — 77030020263 HC SOL INJ SOD CL0.9% LFCR 1000ML

## 2018-10-16 PROCEDURE — 74011000258 HC RX REV CODE- 258: Performed by: NURSE PRACTITIONER

## 2018-10-16 PROCEDURE — 83880 ASSAY OF NATRIURETIC PEPTIDE: CPT

## 2018-10-16 PROCEDURE — 74011250636 HC RX REV CODE- 250/636: Performed by: NURSE PRACTITIONER

## 2018-10-16 PROCEDURE — 77030008771 HC TU NG SALEM SUMP -A

## 2018-10-16 PROCEDURE — 74011250637 HC RX REV CODE- 250/637: Performed by: INTERNAL MEDICINE

## 2018-10-16 PROCEDURE — 36415 COLL VENOUS BLD VENIPUNCTURE: CPT

## 2018-10-16 PROCEDURE — 84484 ASSAY OF TROPONIN QUANT: CPT

## 2018-10-16 PROCEDURE — 74011250636 HC RX REV CODE- 250/636: Performed by: INTERNAL MEDICINE

## 2018-10-16 PROCEDURE — 85730 THROMBOPLASTIN TIME PARTIAL: CPT

## 2018-10-16 PROCEDURE — 77030019605

## 2018-10-16 RX ORDER — MAGNESIUM SULFATE HEPTAHYDRATE 40 MG/ML
2 INJECTION, SOLUTION INTRAVENOUS ONCE
Status: COMPLETED | OUTPATIENT
Start: 2018-10-16 | End: 2018-10-16

## 2018-10-16 RX ORDER — CLOPIDOGREL BISULFATE 75 MG/1
75 TABLET ORAL ONCE
Status: COMPLETED | OUTPATIENT
Start: 2018-10-16 | End: 2018-10-16

## 2018-10-16 RX ORDER — ACETAMINOPHEN 325 MG/1
650 TABLET ORAL
Status: DISCONTINUED | OUTPATIENT
Start: 2018-10-16 | End: 2018-10-21 | Stop reason: HOSPADM

## 2018-10-16 RX ORDER — NALOXONE HYDROCHLORIDE 0.4 MG/ML
0.4 INJECTION, SOLUTION INTRAMUSCULAR; INTRAVENOUS; SUBCUTANEOUS AS NEEDED
Status: DISCONTINUED | OUTPATIENT
Start: 2018-10-16 | End: 2018-10-21 | Stop reason: HOSPADM

## 2018-10-16 RX ORDER — HEPARIN SODIUM 5000 [USP'U]/ML
35 INJECTION, SOLUTION INTRAVENOUS; SUBCUTANEOUS ONCE
Status: COMPLETED | OUTPATIENT
Start: 2018-10-16 | End: 2018-10-16

## 2018-10-16 RX ORDER — HEPARIN SODIUM 5000 [USP'U]/ML
5000 INJECTION, SOLUTION INTRAVENOUS; SUBCUTANEOUS ONCE
Status: DISCONTINUED | OUTPATIENT
Start: 2018-10-16 | End: 2018-10-16

## 2018-10-16 RX ORDER — SPIRONOLACTONE 25 MG/1
25 TABLET ORAL DAILY
Status: DISCONTINUED | OUTPATIENT
Start: 2018-10-17 | End: 2018-10-21 | Stop reason: HOSPADM

## 2018-10-16 RX ORDER — HEPARIN SODIUM 5000 [USP'U]/100ML
12-25 INJECTION, SOLUTION INTRAVENOUS
Status: DISCONTINUED | OUTPATIENT
Start: 2018-10-16 | End: 2018-10-18

## 2018-10-16 RX ORDER — GUAIFENESIN 100 MG/5ML
81 LIQUID (ML) ORAL ONCE
Status: COMPLETED | OUTPATIENT
Start: 2018-10-16 | End: 2018-10-16

## 2018-10-16 RX ORDER — ATORVASTATIN CALCIUM 40 MG/1
40 TABLET, FILM COATED ORAL DAILY
Status: DISCONTINUED | OUTPATIENT
Start: 2018-10-17 | End: 2018-10-21 | Stop reason: HOSPADM

## 2018-10-16 RX ORDER — CARVEDILOL 6.25 MG/1
6.25 TABLET ORAL 2 TIMES DAILY WITH MEALS
Status: DISCONTINUED | OUTPATIENT
Start: 2018-10-16 | End: 2018-10-16

## 2018-10-16 RX ORDER — HEPARIN SODIUM 5000 [USP'U]/ML
4000 INJECTION, SOLUTION INTRAVENOUS; SUBCUTANEOUS ONCE
Status: COMPLETED | OUTPATIENT
Start: 2018-10-16 | End: 2018-10-16

## 2018-10-16 RX ORDER — SODIUM CHLORIDE 0.9 % (FLUSH) 0.9 %
5-10 SYRINGE (ML) INJECTION AS NEEDED
Status: DISCONTINUED | OUTPATIENT
Start: 2018-10-16 | End: 2018-10-21 | Stop reason: HOSPADM

## 2018-10-16 RX ORDER — POTASSIUM CHLORIDE 20 MEQ/1
40 TABLET, EXTENDED RELEASE ORAL
Status: COMPLETED | OUTPATIENT
Start: 2018-10-16 | End: 2018-10-16

## 2018-10-16 RX ORDER — GUAIFENESIN 100 MG/5ML
81 LIQUID (ML) ORAL DAILY
Status: DISCONTINUED | OUTPATIENT
Start: 2018-10-17 | End: 2018-10-21 | Stop reason: HOSPADM

## 2018-10-16 RX ORDER — POTASSIUM CHLORIDE 14.9 MG/ML
20 INJECTION INTRAVENOUS
Status: DISCONTINUED | OUTPATIENT
Start: 2018-10-16 | End: 2018-10-16

## 2018-10-16 RX ORDER — SODIUM CHLORIDE 0.9 % (FLUSH) 0.9 %
5-10 SYRINGE (ML) INJECTION EVERY 8 HOURS
Status: DISCONTINUED | OUTPATIENT
Start: 2018-10-16 | End: 2018-10-21 | Stop reason: HOSPADM

## 2018-10-16 RX ORDER — CLOPIDOGREL BISULFATE 75 MG/1
75 TABLET ORAL DAILY
Status: DISCONTINUED | OUTPATIENT
Start: 2018-10-17 | End: 2018-10-21 | Stop reason: HOSPADM

## 2018-10-16 RX ORDER — CARVEDILOL 12.5 MG/1
12.5 TABLET ORAL 2 TIMES DAILY WITH MEALS
Status: DISCONTINUED | OUTPATIENT
Start: 2018-10-17 | End: 2018-10-17

## 2018-10-16 RX ORDER — ATORVASTATIN CALCIUM 40 MG/1
40 TABLET, FILM COATED ORAL ONCE
Status: COMPLETED | OUTPATIENT
Start: 2018-10-16 | End: 2018-10-16

## 2018-10-16 RX ORDER — SPIRONOLACTONE 25 MG/1
25 TABLET ORAL ONCE
Status: COMPLETED | OUTPATIENT
Start: 2018-10-16 | End: 2018-10-16

## 2018-10-16 RX ADMIN — CLOPIDOGREL BISULFATE 75 MG: 75 TABLET ORAL at 17:09

## 2018-10-16 RX ADMIN — MAGNESIUM SULFATE HEPTAHYDRATE 2 G: 40 INJECTION, SOLUTION INTRAVENOUS at 20:09

## 2018-10-16 RX ADMIN — SPIRONOLACTONE 25 MG: 25 TABLET ORAL at 17:09

## 2018-10-16 RX ADMIN — SACUBITRIL AND VALSARTAN 1 TABLET: 49; 51 TABLET, FILM COATED ORAL at 16:05

## 2018-10-16 RX ADMIN — CARVEDILOL 6.25 MG: 6.25 TABLET, FILM COATED ORAL at 16:04

## 2018-10-16 RX ADMIN — Medication 10 ML: at 13:21

## 2018-10-16 RX ADMIN — Medication 10 ML: at 21:42

## 2018-10-16 RX ADMIN — AMIODARONE HYDROCHLORIDE 1 MG/MIN: 50 INJECTION, SOLUTION INTRAVENOUS at 13:21

## 2018-10-16 RX ADMIN — AMIODARONE HYDROCHLORIDE 1 MG/MIN: 50 INJECTION, SOLUTION INTRAVENOUS at 21:42

## 2018-10-16 RX ADMIN — HEPARIN SODIUM AND DEXTROSE 12 UNITS/KG/HR: 5000; 5 INJECTION INTRAVENOUS at 14:33

## 2018-10-16 RX ADMIN — POTASSIUM CHLORIDE 40 MEQ: 20 TABLET, EXTENDED RELEASE ORAL at 17:08

## 2018-10-16 RX ADMIN — POTASSIUM CHLORIDE 40 MEQ: 20 TABLET, EXTENDED RELEASE ORAL at 23:01

## 2018-10-16 RX ADMIN — HEPARIN SODIUM 2250 UNITS: 5000 INJECTION INTRAVENOUS; SUBCUTANEOUS at 23:01

## 2018-10-16 RX ADMIN — ACETAMINOPHEN 650 MG: 325 TABLET ORAL at 16:04

## 2018-10-16 RX ADMIN — ASPIRIN 81 MG: 81 TABLET, CHEWABLE ORAL at 17:09

## 2018-10-16 RX ADMIN — POTASSIUM CHLORIDE 20 MEQ: 14.9 INJECTION, SOLUTION INTRAVENOUS at 14:39

## 2018-10-16 RX ADMIN — ATORVASTATIN CALCIUM 40 MG: 40 TABLET, FILM COATED ORAL at 17:09

## 2018-10-16 RX ADMIN — HEPARIN SODIUM 4000 UNITS: 5000 INJECTION INTRAVENOUS; SUBCUTANEOUS at 14:31

## 2018-10-16 NOTE — PROGRESS NOTES
Verbal bedside report given to Jared Hester oncoming RN. Patient's situation, background, assessment and recommendations provided. Opportunity for questions provided. Oncoming RN assumed care of patient. Amiodarone gtt verified at bedside.

## 2018-10-16 NOTE — PROGRESS NOTES
Skin assessment completed. Sacrum intact with no signs of pressure. Geat toe on right foot with thickened toenail. Other skin unremarkable.

## 2018-10-16 NOTE — MANAGEMENT PLAN
EP Note Mr. Ayad Bay is a 72year old male with advanced CAD with a history of PCIx6 who was admitted in VT storm. His SC ICD (SJ) confirmed 49 episodes of VT with 37 shocks. This occurred while he was [facility]. His discriminators on his device including a only a 23% morphology match and his MMVT nature of his arrhythmia, speak to VT, not AF. I agree with intravenous amiodarone. If he continues to have ectopy/sustained arrhythmia, low threshold for IV lidocaine. 
 
-Transfer to CCU for higher level of care, extremely high risk of decompensation. -IV amio, consider IV lido.  
-Ensure K>4.0 and Mg >/= 2.5 (higher range due to VT storm). -Maximize beta blockers. -If recurrent VT storm, plan to intubate and start benzos. -Consider VT ablation if VT is refractory to medical therapy. Full consult note to follow. Thank you for allowing me to participate in the electrophysiologic care of Mr. Ayad Bay. Please contact me if any questions or concerns were to arise. Sundar Monzon. Almaz Hernandez MD, MS Clinical Cardiac Electrophysiology [facility] Cardiology

## 2018-10-16 NOTE — PROGRESS NOTES
Patient had 28 beats of Vtach at 13:52. Ruth Reid NP aware and reviewed strip. No new orders received.

## 2018-10-16 NOTE — PROGRESS NOTES
Problem: Falls - Risk of 
Goal: *Absence of Falls Document Brant Kins Fall Risk and appropriate interventions in the flowsheet. Outcome: Progressing Towards Goal 
Fall Risk Interventions: 
Mobility Interventions: Communicate number of staff needed for ambulation/transfer, Patient to call before getting OOB Medication Interventions: Patient to call before getting OOB

## 2018-10-16 NOTE — H&P
[facility] History & Physical  
  
Date of  Admission: 10/16/2018 12:49 PM  
 
Primary Care Physician: Dr. Mehrdad Hernandez Primary Cardiologist:  [facility] Admitting Physician: Dr. Gerald Escamilla CC: AFib RVR, ICD discharge HPI:  Annelise Murillo is a 72 y.o.  male with PMHx of CAD (s/p MI with PCI x6 per Pt, LH with balloon x2 to ISR dRCA 9/2017), HTN, HLD, sHF (last EF 15% on ECHO, felt 5% on LHC), iCMP (s/p ICD - SJM placed 5/2011) and GERD who presented to the ED at [facility] today after his ICD fired multiple times. States that he had gone to [email or web address] to get breakfast and had just pulled back into the driveway when his ICD suddenly fired. States it felt like he was hit in the chest with a baseball bat. States was so hard that it caused him to drop the biscuit out of his hand. Denies any preceding and no associated CP/pressure, palpitations, dizziness or syncope, N/V. States that in the ED was given medicine and the ICD discharges stopped. Reports has been doing fairly well recently. However, over past couple weeks has noted increased SOB. States now has difficulty walked to-from mailbox without becoming SOB and having to rest. States not on O2. States has been sleeping on couch because SOB worsens when he lies in bed at night. States no edema. Reports not aware of having any arrhythmias. He does however report frequently noting tachy palpitations that come and go intermittently. Come at rest or with activity. Spontaneously resolve after 3-5 seconds. Has not had ICD discharge with these events. Per chart review: Pt had device interrogation 8/2018 with a possible VT event that was successfully tx with ATP. Repeat device check 10/2018 with no events and normal function. In the ED at [facility] he was found on EKG to have narrow complex HR of 194 that was felt to be AFib. He was given 2mg Versed and a 20mg diltiazem bolus with initial improvement.  However, HR began to elevate again and he was given additional 5mg diltiazem bolus. Due to critical shortage of diltiazem, he was then given 150mg Amio bolus and started on a gtt. Transfer to [facility] was arranged. Labs from [address]: WBC 10.6, H/H 16.6/49.9, Plt 235, PT 15.5, INR 1.2, PTT 24. Trop 0.11, Mag 2.5, TSH 1.147, , K 3.2, Cr 1.38 Past Medical History:  
Diagnosis Date  AICD (automatic cardioverter/defibrillator) present 5/18/2016  Arrhythmia   
 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  
Procedure Laterality Date  CARDIAC SURG PROCEDURE UNLIST    
 6 stents; last one put in 2 years ago october 2009 [facility] HEART CATHETERIZATION    
 2009 october  HX OTHER SURGICAL    
 scrotum No Known Allergies Social History Social History  Marital status: LEGALLY  Spouse name: N/A  
 Number of children: N/A  
 Years of education: N/A Occupational History  Not on file. Social History Main Topics  Smoking status: Light Tobacco Smoker Packs/day: 0.10 Years: 20.00 Last attempt to quit: 5/6/2011  Smokeless tobacco: Never Used  Alcohol use Yes Comment: occasionally  Drug use: No  
 Sexual activity: Not on file Other Topics Concern  Not on file Social History Narrative Family History Problem Relation Age of Onset  Heart Disease Father  Cancer Sister Current Facility-Administered Medications Medication Dose Route Frequency  [START ON 10/17/2018] aspirin chewable tablet 81 mg  81 mg Oral DAILY  [START ON 10/17/2018] atorvastatin (LIPITOR) tablet 40 mg  40 mg Oral DAILY  carvedilol (COREG) tablet 6.25 mg  6.25 mg Oral BID WITH MEALS  
 [START ON 10/17/2018] clopidogrel (PLAVIX) tablet 75 mg  75 mg Oral DAILY  sacubitril-valsartan (ENTRESTO) 49-51 mg tablet 1 Tab  1 Tab Oral BID  [START ON 10/17/2018] spironolactone (ALDACTONE) tablet 25 mg  25 mg Oral DAILY  sodium chloride (NS) flush 5-10 mL  5-10 mL IntraVENous Q8H  
 sodium chloride (NS) flush 5-10 mL  5-10 mL IntraVENous PRN  
 acetaminophen (TYLENOL) tablet 650 mg  650 mg Oral Q4H PRN  
 naloxone (NARCAN) injection 0.4 mg  0.4 mg IntraVENous PRN  
 amiodarone (CORDARONE) 450 mg in dextrose 5% 250 mL infusion  0.5-1 mg/min IntraVENous TITRATE  heparin (porcine) injection 5,000 Units  5,000 Units IntraVENous ONCE  
 heparin 25,000 units in dextrose 500 mL infusion  12-25 Units/kg/hr IntraVENous TITRATE Review of Systems Review of Systems Constitution: Positive for malaise/fatigue. Negative for chills, diaphoresis, fever, weight gain and weight loss. HENT: Negative for congestion and stridor. Eyes: Negative. Cardiovascular: Positive for dyspnea on exertion, orthopnea and palpitations. Negative for chest pain, leg swelling, near-syncope and syncope. Respiratory: Positive for shortness of breath and sleep disturbances due to breathing. Negative for wheezing. Endocrine: Negative. Hematologic/Lymphatic: Negative. Skin: Negative. Musculoskeletal: Negative. Gastrointestinal: Negative for abdominal pain, change in bowel habit, nausea and vomiting. Genitourinary: Negative. Neurological: Negative for dizziness, headaches, light-headedness, seizures and tremors. Psychiatric/Behavioral: Negative. Subjective:  
 
Visit Vitals  /84  Pulse 80  Temp 97.5 °F (36.4 °C)  Resp 17  SpO2 99% Physical Exam  
Constitutional: He is oriented to person, place, and time. He appears to not be writhing in pain, not malnourished and not jaundiced.  He appears unhealthy. He has a sickly appearance. No distress. HENT:  
Head: Normocephalic and atraumatic. Nose: Nose normal.  
Mouth/Throat: Oropharynx is clear and moist.  
Eyes: Conjunctivae and EOM are normal. Pupils are equal, round, and reactive to light. No scleral icterus. Neck: Normal range of motion. Neck supple. No JVD present. No tracheal deviation present. Cardiovascular: Normal rate, regular rhythm, S1 normal, S2 normal and intact distal pulses. Occasional extrasystoles are present. Exam reveals distant heart sounds. Pulmonary/Chest: Effort normal. No stridor. No respiratory distress. He has no wheezes. He has no rales. On O2, BBS mildly diminished bases Abdominal: Soft. Bowel sounds are normal. He exhibits no distension. There is no tenderness. Musculoskeletal: Normal range of motion. He exhibits no edema. Neurological: He is alert and oriented to person, place, and time. No cranial nerve deficit. Skin: Skin is warm and dry. He is not diaphoretic. Erythematous areas on chest from defib pads Psychiatric: Mood, memory, affect and judgment normal.  
 
 
Cardiographics Telemetry: SR with PVCs ECG: initial AFib RVR rate 194 Echocardiogram: 9/3/2017 SUMMARY: 
-  Left ventricle: The ventricle was moderately dilated. Systolic function Was severely reduced. Ejection fraction was estimated to be 15 %. There was Severe diffuse hypokinesis. There was mild concentric hypertrophy. -  Right ventricle: Systolic function was reduced. -  Tricuspid valve: There was mild regurgitation. Labs: No results found for this or any previous visit (from the past 24 hour(s)). -- see above Patient has been seen and examined by Dr. Primitivo Gray and he agrees with the following assessment and plan: 
 
 Assessment/Plan:  
  
 Principal Problem: 
  Atrial fibrillation with RVR --  admitted to [facility],  with PVCs now, continue IV Amio gtt, will start IV heparin bolus/gtt as well, replete K (keep K=4, Mag=2) and follow daily labs/lytes, will ask EP to see Active Problems: 
  Coronary atherosclerosis of native coronary artery -- on appropriate tx with ASA, Plavix, BB, AECi/ARB and statin therapy -- no recent CP but will cycle troponins Ischemic cardiomyopathy -- has ICD -- will have device interrogated today Mixed hyperlipidemia -- cont statin HTN (hypertension), benign -- cont home meds, monitor BP and adjust meds as indicated Chronic systolic heart failure -- on BB and Entresto, cont meds and adjust as indicated, c/o increased SOB recently, will check BNP -- may need some lasix, hold off on ECHO at this time as last ECHO noted and Pt on maximized med rx at this time ICD (implantable cardioverter-defibrillator) discharge -- multiple fires today per Pt -- will have device interrogated Hypokalemia -- replete to keep K=4 Ronal Tomas NP 
10/16/2018 1:36 PM 
 
ATTENDING ADDENDUM: 
 
Patient seen and examined by me. Agree with above note by physician extender. Key findings are:  No CP or CHF symptoms with chronic severe LV dysfunction and known CAD. Acute onset palpitations today with numerous ICD shocks (>30 per [facility] rep). Appears to be a very rapid wide complex tachy that is fairly regular and worrisome for VT storm. Now controlled and resolved after IV amio bolus and drip at [facility]. No VT now but still with frequent ventricular ectopy on tele, couplets. CV- RRR without murmur, no JVD at 45 deg Lungs- Clear bilaterally Abd- soft, nontender, nondistended Ext- no edema Plan: As above. Check K and Mg from [facility], replete as needed, continue IV amiodarone at 1mg/min overnight, ask EP opinion regarding ICD interrogation and adding amio orally +/- other meds. .. ANTONIO Logan MD 
[facility] Cardiology Pager [phone]

## 2018-10-16 NOTE — PROGRESS NOTES
made initial visit. Pt was alert and verbal.   Pt stated his IV was hurting.  got the pt's Rn to look at the IV.  welcomed pt to DT and shared information about  services.  provided spiritual care through presence, pastoral conversation, and assurance of prayer.

## 2018-10-16 NOTE — PROGRESS NOTES
Pt admitted to 3rd floor [facility] for Afib. CM met with pt to discuss CM needs & DCP. Pt is A&Ox4. Pt is indep at home with all ADLS. Pt lives alone, brother nearby. Pt has no DME needs. Pt has no difficulty with obtaining medications or transport. DCP home vs HH. CM to continue to monitor for dc needs. Care Management Interventions PCP Verified by CM: Yes Last Visit to PCP: 09/10/18 Mode of Transport at Discharge: Other (see comment) (Brother Ty Fawad) Transition of Care Consult (CM Consult): Discharge Planning Discharge Durable Medical Equipment: No 
Physical Therapy Consult: No 
Occupational Therapy Consult: No 
Speech Therapy Consult: No 
Current Support Network: Lives Alone, Family Lives [place] Confirm Follow Up Transport: Family Plan discussed with Pt/Family/Caregiver: Yes Freedom of Choice Offered: Yes Discharge Location Discharge Placement: Home

## 2018-10-17 LAB
ANION GAP SERPL CALC-SCNC: 9 MMOL/L (ref 7–16)
APTT PPP: 112.1 SEC (ref 23.2–35.3)
APTT PPP: 69.5 SEC (ref 23.2–35.3)
APTT PPP: 70.7 SEC (ref 23.2–35.3)
APTT PPP: 95.5 SEC (ref 23.2–35.3)
BUN SERPL-MCNC: 9 MG/DL (ref 8–23)
CALCIUM SERPL-MCNC: 8 MG/DL (ref 8.3–10.4)
CHLORIDE SERPL-SCNC: 109 MMOL/L (ref 98–107)
CO2 SERPL-SCNC: 20 MMOL/L (ref 21–32)
CREAT SERPL-MCNC: 0.96 MG/DL (ref 0.8–1.5)
ERYTHROCYTE [DISTWIDTH] IN BLOOD BY AUTOMATED COUNT: 13.7 %
GLUCOSE SERPL-MCNC: 116 MG/DL (ref 65–100)
HCT VFR BLD AUTO: 42.6 % (ref 41.1–50.3)
HGB BLD-MCNC: 14.2 G/DL (ref 13.6–17.2)
MAGNESIUM SERPL-MCNC: 2.1 MG/DL (ref 1.8–2.4)
MAGNESIUM SERPL-MCNC: 2.5 MG/DL (ref 1.8–2.4)
MCH RBC QN AUTO: 31 PG (ref 26.1–32.9)
MCHC RBC AUTO-ENTMCNC: 33.3 G/DL (ref 31.4–35)
MCV RBC AUTO: 93 FL (ref 79.6–97.8)
NRBC # BLD: 0 K/UL (ref 0–0.2)
PLATELET # BLD AUTO: 148 K/UL (ref 150–450)
PMV BLD AUTO: 10.6 FL (ref 9.4–12.3)
POTASSIUM SERPL-SCNC: 3.4 MMOL/L (ref 3.5–5.1)
POTASSIUM SERPL-SCNC: 4 MMOL/L (ref 3.5–5.1)
RBC # BLD AUTO: 4.58 M/UL (ref 4.23–5.6)
SODIUM SERPL-SCNC: 138 MMOL/L (ref 136–145)
TROPONIN I SERPL-MCNC: 4.09 NG/ML (ref 0.02–0.05)
WBC # BLD AUTO: 7.6 K/UL (ref 4.3–11.1)

## 2018-10-17 PROCEDURE — 74011250637 HC RX REV CODE- 250/637: Performed by: NURSE PRACTITIONER

## 2018-10-17 PROCEDURE — 85730 THROMBOPLASTIN TIME PARTIAL: CPT

## 2018-10-17 PROCEDURE — 74011000258 HC RX REV CODE- 258: Performed by: NURSE PRACTITIONER

## 2018-10-17 PROCEDURE — 65620000000 HC RM CCU GENERAL

## 2018-10-17 PROCEDURE — 80048 BASIC METABOLIC PNL TOTAL CA: CPT

## 2018-10-17 PROCEDURE — 74011250636 HC RX REV CODE- 250/636: Performed by: INTERNAL MEDICINE

## 2018-10-17 PROCEDURE — 77030020263 HC SOL INJ SOD CL0.9% LFCR 1000ML

## 2018-10-17 PROCEDURE — 84132 ASSAY OF SERUM POTASSIUM: CPT

## 2018-10-17 PROCEDURE — 36415 COLL VENOUS BLD VENIPUNCTURE: CPT

## 2018-10-17 PROCEDURE — 84484 ASSAY OF TROPONIN QUANT: CPT

## 2018-10-17 PROCEDURE — 74011250636 HC RX REV CODE- 250/636: Performed by: NURSE PRACTITIONER

## 2018-10-17 PROCEDURE — 85027 COMPLETE CBC AUTOMATED: CPT

## 2018-10-17 PROCEDURE — 83735 ASSAY OF MAGNESIUM: CPT

## 2018-10-17 PROCEDURE — 74011250637 HC RX REV CODE- 250/637: Performed by: INTERNAL MEDICINE

## 2018-10-17 RX ORDER — TEMAZEPAM 15 MG/1
15 CAPSULE ORAL
Status: DISCONTINUED | OUTPATIENT
Start: 2018-10-17 | End: 2018-10-21 | Stop reason: HOSPADM

## 2018-10-17 RX ORDER — MAGNESIUM SULFATE HEPTAHYDRATE 40 MG/ML
2 INJECTION, SOLUTION INTRAVENOUS ONCE
Status: COMPLETED | OUTPATIENT
Start: 2018-10-17 | End: 2018-10-17

## 2018-10-17 RX ORDER — CARVEDILOL 3.12 MG/1
3.12 TABLET ORAL 2 TIMES DAILY WITH MEALS
Status: DISCONTINUED | OUTPATIENT
Start: 2018-10-18 | End: 2018-10-18

## 2018-10-17 RX ORDER — HEPARIN SODIUM 5000 [USP'U]/ML
35 INJECTION, SOLUTION INTRAVENOUS; SUBCUTANEOUS ONCE
Status: COMPLETED | OUTPATIENT
Start: 2018-10-17 | End: 2018-10-17

## 2018-10-17 RX ORDER — DIPHENHYDRAMINE HCL 25 MG
25 CAPSULE ORAL
Status: DISCONTINUED | OUTPATIENT
Start: 2018-10-17 | End: 2018-10-21 | Stop reason: HOSPADM

## 2018-10-17 RX ORDER — FUROSEMIDE 10 MG/ML
40 INJECTION INTRAMUSCULAR; INTRAVENOUS DAILY
Status: DISCONTINUED | OUTPATIENT
Start: 2018-10-17 | End: 2018-10-18

## 2018-10-17 RX ORDER — POTASSIUM CHLORIDE 14.9 MG/ML
20 INJECTION INTRAVENOUS
Status: COMPLETED | OUTPATIENT
Start: 2018-10-17 | End: 2018-10-18

## 2018-10-17 RX ADMIN — Medication 5 ML: at 22:00

## 2018-10-17 RX ADMIN — CARVEDILOL 12.5 MG: 12.5 TABLET, FILM COATED ORAL at 08:35

## 2018-10-17 RX ADMIN — TEMAZEPAM 15 MG: 15 CAPSULE ORAL at 22:34

## 2018-10-17 RX ADMIN — AMIODARONE HYDROCHLORIDE 0.5 MG/MIN: 50 INJECTION, SOLUTION INTRAVENOUS at 15:37

## 2018-10-17 RX ADMIN — POTASSIUM CHLORIDE 20 MEQ: 14.9 INJECTION, SOLUTION INTRAVENOUS at 22:38

## 2018-10-17 RX ADMIN — HEPARIN SODIUM 2250 UNITS: 5000 INJECTION INTRAVENOUS; SUBCUTANEOUS at 06:14

## 2018-10-17 RX ADMIN — HEPARIN SODIUM AND DEXTROSE 16 UNITS/KG/HR: 5000; 5 INJECTION INTRAVENOUS at 18:59

## 2018-10-17 RX ADMIN — FUROSEMIDE 40 MG: 10 INJECTION, SOLUTION INTRAMUSCULAR; INTRAVENOUS at 08:35

## 2018-10-17 RX ADMIN — AMIODARONE HYDROCHLORIDE 1 MG/MIN: 50 INJECTION, SOLUTION INTRAVENOUS at 04:39

## 2018-10-17 RX ADMIN — SODIUM CHLORIDE 250 ML: 900 INJECTION, SOLUTION INTRAVENOUS at 13:56

## 2018-10-17 RX ADMIN — ASPIRIN 81 MG 81 MG: 81 TABLET ORAL at 08:34

## 2018-10-17 RX ADMIN — DIPHENHYDRAMINE HYDROCHLORIDE 25 MG: 25 CAPSULE ORAL at 00:40

## 2018-10-17 RX ADMIN — MAGNESIUM SULFATE HEPTAHYDRATE 2 G: 40 INJECTION, SOLUTION INTRAVENOUS at 21:09

## 2018-10-17 RX ADMIN — Medication 10 ML: at 13:29

## 2018-10-17 RX ADMIN — Medication 10 ML: at 05:09

## 2018-10-17 RX ADMIN — CLOPIDOGREL BISULFATE 75 MG: 75 TABLET ORAL at 08:34

## 2018-10-17 RX ADMIN — ATORVASTATIN CALCIUM 40 MG: 40 TABLET, FILM COATED ORAL at 08:34

## 2018-10-17 RX ADMIN — HEPARIN SODIUM 2250 UNITS: 5000 INJECTION INTRAVENOUS; SUBCUTANEOUS at 22:34

## 2018-10-17 NOTE — PROGRESS NOTES
LEAPFROG PROTOCOL NOTE Rakesh Baba 10/17/2018 The patient is currently in the critical care setting managed by Dr. Mal Mcbride with [facility]. The patient's chart is reviewed and the patient is discussed with the staff. Patient is currently hemodynamically stable. Patient has no needs identified for Intensivist management in the critical care setting at this time. Please notify us if can be of assistance. No charge billed to the patient. Thank you.  
 
Roberto Mortimer, MD

## 2018-10-17 NOTE — PROGRESS NOTES
Bedside, Verbal and Written shift change report given to Rafael Schilling RN (oncoming nurse) by Iveth Mijares RN (offgoing nurse). Report included the following information SBAR, Kardex, Intake/Output, MAR, Recent Results and Cardiac Rhythm SB/NSR. Heparin gtt dual verified in [facility] and currently infusing at 16units/kg/hr

## 2018-10-17 NOTE — PROGRESS NOTES
Problem: Nutrition Deficit Goal: *Optimize nutritional status Nutrition Reason for assessment: Referral received from nursing admission Malnutrition Screening Tool for recently lost 14-23# without trying and eating poorly due to decreased appetite. Assessment:  
Diet order(s): Cardiac, 2 gram Na with 2L fluid restriction with Ensure Enlive tidFood/Nutrition Patient History:  Hx of CAD, CHF (EF 15%). Presented after ICD fires multiple times. Pt reports he lost weight between last summer and this summer because of \"hot weather\". He thinks the weather has an impact on his appetite. However he says his appetite has been good recently, but he is having a hard time eating the food here because it doesn't have salt. He says he has only been able to eat ~50% of his meals here. He reports he weighed 164# last summer (20-17) and then weighed 150# at the start of this summer and dropped down to 141#. However he says his weigh has natacha stable at 140# for about 6 months. He drank Ensure after his heart surgery 14 years ago. Anthropometrics: Stated Height 5' 9\", Weight: 64.8 kg (142 lb 13.7 oz), Weight Source: Bed, Body mass index is 21.1 kg/m². BMI class of underweight. Unable to view weight history for prior encounters at this time. Change in Patient Weight by Encounter [phone]) 
 
(10/16/2018) Hospital Encounter Last Recorded Weight: 64.8 kg (142 lb 13.7 oz) Percent Change: -2.11%   [01/10/2018 to 10/16/2018 (279 Days)] 
 
(01/10/2018) Office Visit Last Recorded Weight: 66.2 kg (146 lb) This is clinically insignificant weight loss Macronutrient needs: EER:  9468-4132 kcal /day (25-30 kcal/kg ABW) 
EPR:  65-77 grams protein/day (1-1.2 grams/kg ABW) Intake/Comparative Standards: Average intake for past 1 day(s)/2 recorded meal(s): 88%. 2 meals does not represent a trend but this potentially meets ~100% of kcal and ~82% of protein needs Nutrition Diagnosis: Inadequate oral intake r/t decreased ability to consume sufficient oral intake as evidenced by dislike of low sodium diet Intervention: 
Meals and snacks: Continue current diet. Nutrition Supplement Therapy: Decrease Ensure Enlive to once a day. Discharge nutrition plan: Too soon to determine. Evetta Phalen, [address] [place] [address], [facility] [address]

## 2018-10-17 NOTE — PROGRESS NOTES
Bedside shift change report given to Rosa Maria Eric, RN (oncoming nurse) by Jeremy Shaw RN (offgoing nurse). Report included the following information SBAR, Kardex, ED Summary, Intake/Output, MAR, Recent Results and Cardiac Rhythm SB with BBB. Heparin gtt dual verified

## 2018-10-17 NOTE — PROGRESS NOTES
TRANSFER - IN REPORT: 
 
Verbal report received from Parris Barnett RN (name) on Wes Faria  being received from [address] (unit) for routine progression of care Report consisted of patients Situation, Background, Assessment and  
Recommendations(SBAR). Information from the following report(s) SBAR, Kardex, ED Summary, Intake/Output, MAR, Recent Results, Cardiac Rhythm NSR with PVCs and Alarm Parameters  was reviewed with the receiving nurse. Opportunity for questions and clarification was provided.

## 2018-10-17 NOTE — PROGRESS NOTES
Physical Therapy Note: 
 
Participated in interdisciplinary rounds in ICU/CCU and chart reviewed. Patient is experiencing decrease in function from baseline. Patient would benefit from skilled acute therapy to increase independence with self care/ADLs, strength, endurance, and functional mobility. Recommend PT/OT consult when medically stable and MD agrees. Thank you for your consideration, Chacho Krishnamurthy DPT

## 2018-10-17 NOTE — PROGRESS NOTES
Chart reviewed after tx from tele to CCU s/p VT storm. Screen completed by Atul Kimbrough. CM following for any d/c needs.

## 2018-10-17 NOTE — PROGRESS NOTES
Notified Dr. Gareth Carrillo of patient's bradycardia and hypotension with HR in low 50s and BP 80s/50s. Patient is asymptomatic at this time. Ordered to hold all medications that affect BP until SBP>110 and continue to run amiodarone at 0.5 mg/min. MD will adjust BP medications. No acute distress noted, will continue to monitor patient closely.

## 2018-10-17 NOTE — PROGRESS NOTES
TRANSFER - OUT REPORT: 
 
Verbal report given to Amira Bustillo RN(name) on [place]  being transferred to CCU(unit) for change in patient condition(28 beats of V-tach at 1400 hr.) Report consisted of patients Situation, Background, Assessment and  
Recommendations(SBAR). Information from the following report(s) Kardex, Intake/Output, MAR, Recent Results and Cardiac Rhythm NSR with Frequent PVC's was reviewed with the receiving nurse. Lines:  
Peripheral IV 10/16/18 Right Antecubital (Active) Site Assessment Clean, dry, & intact 10/16/2018  7:30 PM  
Phlebitis Assessment 0 10/16/2018  7:30 PM  
Infiltration Assessment 0 10/16/2018  7:30 PM  
Dressing Status Clean, dry, & intact 10/16/2018  7:30 PM  
Dressing Type Transparent;Tape 10/16/2018  7:30 PM  
Hub Color/Line Status Infusing;Patent 10/16/2018  7:30 PM  
Alcohol Cap Used No 10/16/2018  7:30 PM  
   
Peripheral IV 10/16/18 Left Antecubital (Active) Site Assessment Clean, dry, & intact 10/16/2018  7:30 PM  
Phlebitis Assessment 0 10/16/2018  7:30 PM  
Infiltration Assessment 0 10/16/2018  7:30 PM  
Dressing Status Clean, dry, & intact 10/16/2018  7:30 PM  
Dressing Type Transparent;Tape 10/16/2018  7:30 PM  
Hub Color/Line Status Pink; Infusing;Patent 10/16/2018  7:30 PM  
Alcohol Cap Used No 10/16/2018  7:30 PM  
  
 
Opportunity for questions and clarification was provided. Patient transported with: 
O2 @  3 l/m via NC. Amio. Gtt, Heparin gtt, Magnesium bolus. Primary RN Monitor

## 2018-10-17 NOTE — PROGRESS NOTES
Patient arrived to unit and placed on cardiac monitors. Patient is A&Ox4, VSS, NAD. Chlorhexidene bath and dual skin assessment performed with Marlon Stevenson RN. Skin is warm, dry, intact. No skin breakdown or pressure ulcers noted.

## 2018-10-17 NOTE — PROGRESS NOTES
[facility] CARDIOLOGY PROGRESS NOTE 
 
10/17/2018 8:01 AM 
 
Admit Date: 10/16/2018 Admit Diagnosis: afib;Atrial fibrillation with RVR ([address] Subjective:  
Stable overnight without angina, CHF, or palpitations. VT storm resolved with IV amiodarone- minimal PVC's overnight. labs stable. Vitals stable and controlled. Complains of mild worsening SOB and orthopnea with known severe LV dysfunction, but not overtly volume overloaded at present. No other complaints overnight. Tolerating meds well. Objective:  
  
Vitals:  
 10/17/18 1753 10/17/18 3844 10/17/18 5278 10/17/18 5949 BP: 119/87  (!) 124/91 119/89 Pulse: (!) 58 66  62 Resp: 25 21  20 Temp:    97.7 °F (36.5 °C) SpO2: 96% 93%  98% Weight:      
 
 
Physical Exam: 
Neck- supple, 10cm JVD 
CV- regular rate and rhythm no MRG Lung- clear bilaterally, mildly decreased bibasilar but no crackles Abd- soft, nontender, nondistended Ext- no edema Skin- warm and dry Data Review:  
Recent Labs 10/17/18 
0157 10/16/18 
2128  140  
K 4.0 3.7 MG 2.5*  --   
BUN 9 10 CREA 0.96 1.05  
* 118* WBC 7.6  --   
HGB 14.2  --   
HCT 42.6  --   
*  --   
 
 
Assessment and Plan:  
 
Principal Problem: VT storm- improved dramatically with IV amiodarone, decrease to 0.5mg/min this AM.  If starts again, increase amio and add IV lidocaine. Keep K>4 and Mg>2.5. Recheck this afternoon since adding lasix this AM 
 
 SOB/orthopnea- elevated BNP, severe chronically depressed LV function- IV lasix this AM and tomorrow. Check labs this afternoon, replete lytes aggressively to prevent nidus for recurrent VT Active Problems: 
  Coronary atherosclerosis of native coronary artery- no angina prior to VT storm, see last cath report- medical therapy planned for now- maximize meds as tolerated Ischemic cardiomyopathy (10/14/2009) Mixed hyperlipidemia - stable, continue meds HTN (hypertension), benign - stable, continue meds Chronic systolic heart failure ([facility] [address] [facility]- worsening SOB this AM, mild orthopnea- lasix today, see above 
 
  ICD (implantable cardioverter-defibrillator) discharge - 37 shocks yesterday- see above, stable overnight with minimal PVC's at most.  
 
  Hypokalemia (10/16/2018)- repleted, normalized, see above. Daniel Mathew MD 
[address] Cardiology Pager [phone]

## 2018-10-17 NOTE — CONSULTS
[address], [address] [place], [address]  Timbo Nunez, [address] [place] [address]  PHONE: [phone]    CLINICAL CARDIAC ELECTROPHYSIOLOGY CONSULTATION  Kya Crawford  1945  10/17/2018    Referring: ANTONIO Michael MD    Reason for Consultation: VT storm    ASSESSMENT and PLAN:  Diagnoses and all orders for this visit:    VT Storm   Atherosclerosis of native coronary artery of native heart with stable angina pectoris ([address]  Ischemic cardiomyopathy, EF 15-20%, NYHA class IIIb Heart Failure  Chronic obstructive CAD  History of SC ICD     Mr. Genoveva Lechuga is a 72year old male with advanced CAD with a history of PCIx6 who was admitted in VT storm. His SC ICD (SJ) confirmed 49 episodes of MMVT with 37 shocks. This occurred while he was [employer]. His discriminators on his device including a only a 23% morphology match and his MMVT nature of his arrhythmia, speak to VT, not AF, 315 msec. I agree with intravenous amiodarone. If he continues to have ectopy/sustained arrhythmia, low threshold for IV lidocaine.     -CCU level care for another 24 hours, ok to transfer if remains stable. -IV amio, consider IV lido. Cross titration with oral amiodarone load over next 24 hours. Start 400 mg po TID. -Daily ECG to assess QTc.  -Consider LHC/RHC for further workup.   -If goes into sustained VT, get 12 lead ECG if able. -Keep magnet in patient room.   -Aggressive HF mgmt, elevated neck veins and cool to lukewarm on exam.    -Ensure K>4.0 and Mg >/= 2.5 (higher range due to VT storm). -Maximize beta blockers. -If recurrent VT storm, plan to intubate and start benzos. -Consider VT ablation if VT is refractory to medical therapy based on [facility] VT and [facility] trial evidence. -Referral for LVAD?? EF 5%! (EF reduction over baseline likely related to ventricular stunning from shock therapy, would repeat limited TTE in 24-48 hours).      Thank you for allowing me to participate in the electrophysiologic care of Mr. Sam Conde Michael Even. Please contact me if any questions or concerns were to arise. Flakito Patient. Emelina NGUYEN, MS  Clinical Cardiac Electrophysiology  [facility] Card[facility]  10/17/18  10:00 AM    ===================================================================  Chief Complant:  No chief complaint on file. Consultation is requested by No ref. provider found for evaluation of No chief complaint on file. History:  Erika Cantu is a most pleasant 72 y.o. male with a past medical and cardiac history significant for ICM, severe obstructive CAD s/p SC ICD (CLAY) who was admitted after getting shocked over 30 times while he was at a [employer]. He presented to an ED in [facility] and was promptly transferred to [facility] for further workup and care. He has known severe CAD with a very poor EF. He has been electrically quiescent since admission but he does have some PVCs and nonsustained runs of VT; even less since initiation of IV amiodarone. He is mildly dyspneic, but not profoundly over baseline. He has a history of medication noncompliance and dietary indiscretions. His last LHC was in 9/17 and he received a POBA of the RCA. He has done ok since that time until this admission. Cardiac PMH: (Old records have been reviewed and summarized below)    EKG:  (EKG has been independently visualized by me with interpretation below): NSR, QRS notching and old IMI. QRSd = 110 msec. ECHO: 9/3/2017  -  Left ventricle: The ventricle was moderately dilated. Systolic function   Was severely reduced. Ejection fraction was estimated to be 15 %. There was   Severe diffuse hypokinesis. There was mild concentric hypertrophy. -  Right ventricle: Systolic function was reduced. -  Tricuspid valve: There was mild regurgitation. Previous Heart Catheterization: 9/4/2017  1. Left ventricle: Left ventriculogram not obtained. The patient   is hypotensive upon presentation, blood pressure in the 60s. EDP   is measured at 9.  There is no aortic valve gradient. The patient   is given intravenous Senthil-Synephrine and IV fluids with   improvement in systolic blood pressure. Review of echocardiogram   demonstrates severely dilated left ventricular cavity with   severe LV systolic dysfunction, ejection fraction is 5%. This   demonstrates a reduction over the last year when EF was   estimated at 15-20%. 2. Left main: Left main is moderate in size, bifurcates in the   LAD and circumflex system is angiographically normal.   3. Left anterior descending coronary: This is a moderate-sized   vessel. The LAD is occluded in the mid portion. There is a large   LAD septal system. The LAD and diagonal appear to fill by   collaterals. They are quite small and somewhat atretic in   nature. There is no identifiable origin off the LAD at the time   of his angioplasty. There are 3 large septals which primarily   originated off the LAD system. There is a small proximal   diagonal which remains patent. 4. Left circumflex coronary: This is a moderate-sized vessel. The proximal portion has been stented into the second obtuse   marginal. The first obtuse marginal has been jailed. There   is approximately 50% ostial first obtuse marginal. The distal   edge of the stent appears to have a 40-50% lesion present. 5. Right coronary: Large anatomically dominant vessel. It has a   stent in the mid portion which remains widely patent. There is a   stent in the distal portion which has a focal 85% eccentric   lesion present. It bifurcates into the posterior descending and   posterolateral branches. The posterior descending branch is a   long vessel, but diffusely diseased on the order of 50% to 60%. The posterolateral branch is a moderate-sized vessel. It has   minimal luminal irregularities. DETAILS: The patient was anticoagulated with Angiomax. A 6-  [place] JR4 guide was utilized. A [place] wire was advanced   distally.  The lesion was dilated with 3.0 x 15 NC Euphora balloon on 2 separate occasions with near resolution of the   focal in-stent restenosis. No additional stenting was performed. There is significant concern for medical noncompliance in this   patient. CONCLUSION:   1. Severe LV systolic dysfunction with an end-diastolic pressure   9 mmHg, which suggests the patient is likely volume depleted at   this time. 2. Chronically occluded mid LAD that appears to supply a   diagonal. The LAD diagonal is somewhat atretic in nature with   poor collateralization. I do not see any origin off the LAD   proper and the septals are patent. This appears to be chronic in   nature and recommend ongoing medical therapy. 3. The left circumflex remains patent. The first obtuse marginal   has been jailed and appears to have a 50% ostial stenosis. There   is also a focal 50% stenosis just distal to the stent that   extends into the second obtuse marginal. These will be managed   medically at this time. 4. Right coronary: The patient has 2 prior stents, one in the   mid and distal. There is an eccentric ulcerated-appearing the   area within the distal placed stent. This is status post balloon   angioplasty, with satisfactory angiographic result. He has   diffuse moderate pattern disease in the right posterior   descending and a relatively normal posterolateral branch. The   right coronary is the largest of his 3 epicardial vessels. DEVICE INTERROGATION: [facility] [place] ICD. All capture thresholds, lead sensing and impedance measurements are stable and consistent with a normal functioning device. Battery life is stable. 49 VT events, 37 shocks on 10/15/2017. Discriminators with morphology mismatch and EGMs consistent with MMVT with TCL at 315 msec/190 bpm.     Past Medical History, Past Surgical History, Family history, Social History, and Medications were all reviewed with the patient today and updated as necessary.      Current Facility-Administered Medications   Medication Dose Route Frequency Provider Last Rate Last Dose    diphenhydrAMINE (BENADRYL) capsule 25 mg  25 mg Oral Q6H PRN Katharina Ty A, NP   25 mg at 10/17/18 0040    furosemide (LASIX) injection 40 mg  40 mg IntraVENous DAILY Krista Vergara MD   40 mg at 10/17/18 9892    aspirin chewable tablet 81 mg  81 mg Oral DAILY Thersia Lines, NP   81 mg at 10/17/18 5591    atorvastatin (LIPITOR) tablet 40 mg  40 mg Oral DAILY Thersia Lines, NP   40 mg at 10/17/18 1827    clopidogrel (PLAVIX) tablet 75 mg  75 mg Oral DAILY Thersia Lines, NP   75 mg at 10/17/18 1697    sacubitril-valsartan (ENTRESTO) 49-51 mg tablet 1 Tab  1 Tab Oral BID Thersia Lines, NP   1 Tab at 10/16/18 1605    spironolactone (ALDACTONE) tablet 25 mg  25 mg Oral DAILY Thersia Lines, NP        sodium chloride (NS) flush 5-10 mL  5-10 mL IntraVENous Q8H Karthikeyan Orourke K, NP   10 mL at 10/17/18 0509    sodium chloride (NS) flush 5-10 mL  5-10 mL IntraVENous PRN Thersia Lines, NP        acetaminophen (TYLENOL) tablet 650 mg  650 mg Oral Q4H PRN Thersia Lines, NP   650 mg at 10/16/18 1604    naloxone (NARCAN) injection 0.4 mg  0.4 mg IntraVENous PRN Thersia Lines, NP        amiodarone (CORDARONE) 450 mg in dextrose 5% 250 mL infusion  0.5-1 mg/min IntraVENous TITRATE Thersia Lines, NP 33.3 mL/hr at 10/17/18 0439 1 mg/min at 10/17/18 0439    heparin 25,000 units in dextrose 500 mL infusion  12-25 Units/kg/hr IntraVENous TITRATE Thersia Lines, NP 20.5 mL/hr at 10/17/18 0705 16 Units/kg/hr at 10/17/18 0705    carvedilol (COREG) tablet 12.5 mg  12.5 mg Oral BID WITH MEALS Trixie Christianson MD   12.5 mg at 10/17/18 8993     No Known Allergies  Patient Active Problem List    Diagnosis    Atrial fibrillation with RVR (HCC)    Hypokalemia    Unilateral inguinal hernia    Blue toe syndrome of left lower extremity (HCC)    Pulmonary edema cardiac cause (HCC)    Acute hyperglycemia     secondary to respiratory and stress ? new onset DM       ICD (implantable cardioverter-defibrillator) discharge    AICD (automatic cardioverter/defibrillator) present    Chronic systolic heart failure (HCC)    Coronary atherosclerosis of native coronary artery    Ischemic cardiomyopathy    Mixed hyperlipidemia    HTN (hypertension), benign       Past Medical History:   Diagnosis Date    AICD (automatic cardioverter/defibrillator) present 2016    Arrhythmia     irregular    Blue toe syndrome of left lower extremity ([address] 1/10/2018    CAD (coronary artery disease)     stent    Chronic systolic heart failure ([address]     Coronary atherosclerosis of native coronary artery     GERD (gastroesophageal reflux disease)     Hypertension     Ischemic cardiomyopathy     Tobacco use disorder 2016     Past Surgical History:   Procedure Laterality Date    CARDIAC SURG PROCEDURE UNLIST      6 stents; last one put in 2 years ago 2009    HX HEART CATHETERIZATION      2009    HX OTHER SURGICAL      scrotum     Family History   Problem Relation Age of Onset    Heart Disease Father     Cancer Sister      Social History     Tobacco Use    Smoking status: Light Tobacco Smoker     Packs/day: 0.10     Years: 20.00     Pack years: 2.00     Last attempt to quit: 2011     Years since quittin.4    Smokeless tobacco: Never Used   Substance Use Topics    Alcohol use: Yes     Comment: occasionally       ROS:  A comprehensive review of systems was performed with the pertinent positives and negatives as noted in the HPI in addition to:    Review of Systems   Constitutional: Positive for malaise/fatigue. HENT: Negative. Eyes: Negative. Respiratory: Positive for shortness of breath. Cardiovascular: Positive for chest pain and palpitations. Gastrointestinal: Negative. Genitourinary: Negative. Musculoskeletal: Negative. Skin: Negative. Neurological: Positive for dizziness. Negative for focal weakness. Endo/Heme/Allergies: Negative. Psychiatric/Behavioral: Negative. PHYSICAL EXAM:     Visit Vitals  BP 97/73   Pulse (!) 58   Temp 97.7 °F (36.5 °C)   Resp 23   Wt 142 lb 13.7 oz (64.8 kg)   SpO2 99%   BMI 21.10 kg/m²        Wt Readings from Last 3 Encounters:   10/16/18 142 lb 13.7 oz (64.8 kg)   01/10/18 146 lb (66.2 kg)   10/04/17 141 lb 12.8 oz (64.3 kg)     BP Readings from Last 3 Encounters:   10/17/18 97/73   01/10/18 118/78   10/04/17 110/80       Gen: Well appearing, well developed, no acute distress  Eyes: Pupils equal, round. Extraocular movements are intact  ENT: Oropharynx clear, no oral lesions, normal dentition  CV: S1S2, regular rate and rhythm, no murmurs, rubs or gallops, JVD To mandible with easily seen V wave, no carotid bruits, normal distal pulses, extremities cool to lukewarm, no significant edema. Pulm: Clear to auscultation bilaterally, no accessory muscle uses, no wheezes or rales  GI: Soft, NT, ND, +BS  Neuro: Alert and oriented, nonfocal  Psych: Appropriate affect  Skin: Normal color and skin turgor  MSK: Normal muscle bulk and tone    Medical problems and test results were reviewed with the patient today.      Results for orders placed or performed during the hospital encounter of 10/16/18   TROPONIN I   Result Value Ref Range    Troponin-I, Qt. 1.77 (HH) 0.02 - 0.05 NG/ML   BNP   Result Value Ref Range    BNP 1,512 pg/mL   TROPONIN I   Result Value Ref Range    Troponin-I, Qt. 4.04 (HH) 0.02 - 0.05 NG/ML   PTT   Result Value Ref Range    aPTT 62.3 (H) 23.2 - 01.8 SEC   METABOLIC PANEL, BASIC   Result Value Ref Range    Sodium 140 136 - 145 mmol/L    Potassium 3.7 3.5 - 5.1 mmol/L    Chloride 109 (H) 98 - 107 mmol/L    CO2 21 21 - 32 mmol/L    Anion gap 10 7 - 16 mmol/L    Glucose 118 (H) 65 - 100 mg/dL    BUN 10 8 - 23 MG/DL    Creatinine 1.05 0.8 - 1.5 MG/DL    GFR est AA >60 >60 ml/min/1.73m2    GFR est non-AA >60 >60 ml/min/1.73m2    Calcium 8.0 (L) 8.3 - 10.4 MG/DL METABOLIC PANEL, BASIC   Result Value Ref Range    Sodium 138 136 - 145 mmol/L    Potassium 4.0 3.5 - 5.1 mmol/L    Chloride 109 (H) 98 - 107 mmol/L    CO2 20 (L) 21 - 32 mmol/L    Anion gap 9 7 - 16 mmol/L    Glucose 116 (H) 65 - 100 mg/dL    BUN 9 8 - 23 MG/DL    Creatinine 0.96 0.8 - 1.5 MG/DL    GFR est AA >60 >60 ml/min/1.73m2    GFR est non-AA >60 >60 ml/min/1.73m2    Calcium 8.0 (L) 8.3 - 10.4 MG/DL   MAGNESIUM   Result Value Ref Range    Magnesium 2.5 (H) 1.8 - 2.4 mg/dL   CBC W/O DIFF   Result Value Ref Range    WBC 7.6 4.3 - 11.1 K/uL    RBC 4.58 4.23 - 5.6 M/uL    HGB 14.2 13.6 - 17.2 g/dL    HCT 42.6 41.1 - 50.3 %    MCV 93.0 79.6 - 97.8 FL    MCH 31.0 26.1 - 32.9 PG    MCHC 33.3 31.4 - 35.0 g/dL    RDW 13.7 %    PLATELET 319 (L) 170 - 450 K/uL    MPV 10.6 9.4 - 12.3 FL    ABSOLUTE NRBC 0.00 0.0 - 0.2 K/uL   PTT   Result Value Ref Range    aPTT 95.5 (HH) 23.2 - 35.3 SEC   TROPONIN I   Result Value Ref Range    Troponin-I, Qt. 4.09 (HH) 0.02 - 0.05 NG/ML   PTT   Result Value Ref Range    aPTT 69.5 (H) 23.2 - 35.3 SEC       Lab Results   Component Value Date/Time    Potassium 4.0 10/17/2018 01:57 AM     Lab Results   Component Value Date/Time    Creatinine 0.96 10/17/2018 01:57 AM     Lab Results   Component Value Date/Time    HGB 14.2 10/17/2018 01:57 AM     Lab Results   Component Value Date/Time    INR 1.0 05/10/2011 02:07 PM    Prothrombin time 10.6 05/10/2011 02:07 PM

## 2018-10-17 NOTE — PROGRESS NOTES
A follow up visit was made to the patient. Emotional support, spiritual presence and assurance of prayer were provided. There was a BPA for a HCPOA listed for this patient. The patient received a copy of the HCPOA and sheared that he did not want to work on this today. Queen Taz Sloan

## 2018-10-18 PROBLEM — I47.20 VENTRICULAR TACHYCARDIA: Chronic | Status: ACTIVE | Noted: 2018-10-16

## 2018-10-18 LAB
ANION GAP SERPL CALC-SCNC: 10 MMOL/L (ref 7–16)
APTT PPP: 170.7 SEC (ref 23.2–35.3)
APTT PPP: 28 SEC (ref 23.2–35.3)
BUN SERPL-MCNC: 10 MG/DL (ref 8–23)
CALCIUM SERPL-MCNC: 8.3 MG/DL (ref 8.3–10.4)
CHLORIDE SERPL-SCNC: 104 MMOL/L (ref 98–107)
CO2 SERPL-SCNC: 21 MMOL/L (ref 21–32)
CREAT SERPL-MCNC: 1.21 MG/DL (ref 0.8–1.5)
ERYTHROCYTE [DISTWIDTH] IN BLOOD BY AUTOMATED COUNT: 13.6 %
GLUCOSE SERPL-MCNC: 107 MG/DL (ref 65–100)
HCT VFR BLD AUTO: 40.7 % (ref 41.1–50.3)
HGB BLD-MCNC: 13.7 G/DL (ref 13.6–17.2)
MAGNESIUM SERPL-MCNC: 2.6 MG/DL (ref 1.8–2.4)
MCH RBC QN AUTO: 31.1 PG (ref 26.1–32.9)
MCHC RBC AUTO-ENTMCNC: 33.7 G/DL (ref 31.4–35)
MCV RBC AUTO: 92.5 FL (ref 79.6–97.8)
NRBC # BLD: 0 K/UL (ref 0–0.2)
PLATELET # BLD AUTO: 145 K/UL (ref 150–450)
PMV BLD AUTO: 11.2 FL (ref 9.4–12.3)
POTASSIUM SERPL-SCNC: 4 MMOL/L (ref 3.5–5.1)
RBC # BLD AUTO: 4.4 M/UL (ref 4.23–5.6)
SODIUM SERPL-SCNC: 135 MMOL/L (ref 136–145)
WBC # BLD AUTO: 8 K/UL (ref 4.3–11.1)

## 2018-10-18 PROCEDURE — 74011250637 HC RX REV CODE- 250/637: Performed by: NURSE PRACTITIONER

## 2018-10-18 PROCEDURE — 65660000000 HC RM CCU STEPDOWN

## 2018-10-18 PROCEDURE — 85730 THROMBOPLASTIN TIME PARTIAL: CPT

## 2018-10-18 PROCEDURE — 74011250637 HC RX REV CODE- 250/637: Performed by: INTERNAL MEDICINE

## 2018-10-18 PROCEDURE — 74011250636 HC RX REV CODE- 250/636: Performed by: NURSE PRACTITIONER

## 2018-10-18 PROCEDURE — 74011000258 HC RX REV CODE- 258: Performed by: NURSE PRACTITIONER

## 2018-10-18 PROCEDURE — 74011250636 HC RX REV CODE- 250/636: Performed by: INTERNAL MEDICINE

## 2018-10-18 PROCEDURE — 85027 COMPLETE CBC AUTOMATED: CPT

## 2018-10-18 PROCEDURE — 80048 BASIC METABOLIC PNL TOTAL CA: CPT

## 2018-10-18 PROCEDURE — 83735 ASSAY OF MAGNESIUM: CPT

## 2018-10-18 PROCEDURE — 36415 COLL VENOUS BLD VENIPUNCTURE: CPT

## 2018-10-18 RX ORDER — FUROSEMIDE 40 MG/1
40 TABLET ORAL DAILY
Status: DISCONTINUED | OUTPATIENT
Start: 2018-10-18 | End: 2018-10-21 | Stop reason: HOSPADM

## 2018-10-18 RX ORDER — AMIODARONE HYDROCHLORIDE 200 MG/1
400 TABLET ORAL 3 TIMES DAILY
Status: DISCONTINUED | OUTPATIENT
Start: 2018-10-18 | End: 2018-10-20

## 2018-10-18 RX ORDER — CARVEDILOL 6.25 MG/1
6.25 TABLET ORAL 2 TIMES DAILY WITH MEALS
Status: DISCONTINUED | OUTPATIENT
Start: 2018-10-18 | End: 2018-10-20

## 2018-10-18 RX ADMIN — AMIODARONE HYDROCHLORIDE 0.5 MG/MIN: 50 INJECTION, SOLUTION INTRAVENOUS at 09:15

## 2018-10-18 RX ADMIN — POTASSIUM CHLORIDE 20 MEQ: 14.9 INJECTION, SOLUTION INTRAVENOUS at 02:00

## 2018-10-18 RX ADMIN — AMIODARONE HYDROCHLORIDE 400 MG: 200 TABLET ORAL at 08:36

## 2018-10-18 RX ADMIN — CLOPIDOGREL BISULFATE 75 MG: 75 TABLET ORAL at 08:36

## 2018-10-18 RX ADMIN — CARVEDILOL 6.25 MG: 6.25 TABLET, FILM COATED ORAL at 16:47

## 2018-10-18 RX ADMIN — CARVEDILOL 6.25 MG: 6.25 TABLET, FILM COATED ORAL at 08:36

## 2018-10-18 RX ADMIN — AMIODARONE HYDROCHLORIDE 400 MG: 200 TABLET ORAL at 16:47

## 2018-10-18 RX ADMIN — Medication 5 ML: at 14:00

## 2018-10-18 RX ADMIN — DIPHENHYDRAMINE HYDROCHLORIDE 50 MG: 25 CAPSULE ORAL at 00:27

## 2018-10-18 RX ADMIN — SACUBITRIL AND VALSARTAN 1 TABLET: 24; 26 TABLET, FILM COATED ORAL at 22:12

## 2018-10-18 RX ADMIN — ATORVASTATIN CALCIUM 40 MG: 40 TABLET, FILM COATED ORAL at 08:36

## 2018-10-18 RX ADMIN — AMIODARONE HYDROCHLORIDE 400 MG: 200 TABLET ORAL at 22:44

## 2018-10-18 RX ADMIN — SPIRONOLACTONE 25 MG: 25 TABLET ORAL at 08:36

## 2018-10-18 RX ADMIN — SACUBITRIL AND VALSARTAN 1 TABLET: 24; 26 TABLET, FILM COATED ORAL at 08:36

## 2018-10-18 RX ADMIN — ASPIRIN 81 MG 81 MG: 81 TABLET ORAL at 08:36

## 2018-10-18 RX ADMIN — Medication 10 ML: at 06:00

## 2018-10-18 RX ADMIN — FUROSEMIDE 40 MG: 20 TABLET ORAL at 08:36

## 2018-10-18 RX ADMIN — TEMAZEPAM 15 MG: 15 CAPSULE ORAL at 22:01

## 2018-10-18 NOTE — PROGRESS NOTES
10/18/18 1761 Dual Skin Pressure Injury Assessment Dual Skin Pressure Injury Assessment WDL Second Care Provider (Based on [address] [place] [address]) Chip Carrel, RN Skin Integumentary Skin Integumentary (WDL) X Pressure  Injury Documentation No Pressure Injury Noted-Pressure Ulcer Prevention Initiated Skin Color Appropriate for ethnicity Skin Condition/Temp Warm;Dry Skin Integrity Intact;Scars (comment) Turgor Non-tenting Hair Growth Present Varicosities Absent Dual RN skin assessment completed. Patient's skin noted to be warm and dry with scars. Skin intact over all pressure points. No other skin issues noted.

## 2018-10-18 NOTE — PROGRESS NOTES
Call placed to  NP to request sleep med for patient. Benadryl did not help last nite. Order recieved

## 2018-10-18 NOTE — PROGRESS NOTES
Date of Outreach Update: 
Matt Cee was seen and assessed. MEWS Score: 1 (10/18/18 5528) Vitals:  
 10/18/18 1437 10/18/18 1511 10/18/18 1613 10/18/18 1647 BP: (!) 89/57 (!) 87/63 118/84 117/87 Pulse:  (!) 48 (!) 53 62 Resp:   17 Temp:      
SpO2:   100% Weight:      
  
 
 Pain Assessment Pain Intensity 1: 0 (10/18/18 0930) Patient Stated Pain Goal: 0 Saw patient and discussed his BP with him as he seems nervous about it. Patient's BP cycled and systolic is in 03B with MAP 64. Primary RN made aware. We are going to try a smaller BP cuff and try BP again. Edit: Patient's BP 97/63 with smaller cuff. Per MD orders that BP is acceptable and patient is asymptomatic eating dinner. Will continue to follow up per outreach protocol. Signed By:   Mary Garnica RN   October 18, 2018 5:45 PM

## 2018-10-18 NOTE — PROGRESS NOTES
[facility] OUTREACH NURSE PROGRESS REPORT SUBJECTIVE: Called to assess patient secondary to patient being transferred out of Critical Care. MEWS Score: 1 (10/18/18 1689) Vitals:  
 10/18/18 0810 10/18/18 0846 10/18/18 0922 10/18/18 1030 BP: 113/84  115/80 Pulse: (!) 58 65 62 Resp: 19  18 Temp:   97.4 °F (36.3 °C) SpO2: 99%  100% 94% Weight:      
  
EKG: SB/NSR with BBB OBJECTIVE: On arrival to room, I found patient to be lying in bed. Pain Assessment Pain Intensity 1: 0 (10/18/18 0930) Patient Stated Pain Goal: 0 
 
ASSESSMENT:  Patient seen and he denies any needs at this time. Patient denies pain and states he \"just need to sleep\". Patient's light turned off so patient can rest. Patient educated on Outreach RN coming to round on him. Primary RN denies any needs at this time. PLAN:  To continue to follow per Outreach Protocol.

## 2018-10-18 NOTE — PROGRESS NOTES
Patient's BP trending down to 87/63 after amiodarone drip restarted at 1407 per Dr. Amaya Elias. Patient's heart rate 49 bpm. Dr. Radha Ross, is ok with SBP in the 80's as long as the MAP is greater than 60 and patient is asymptomatic. Will continue to monitor and pass on to night shift RN.

## 2018-10-18 NOTE — PROGRESS NOTES
Patient had a run of NS Vtach. Showed to Dr. Johann Crawford. Per Dr. Todd Woodward, turn amiodarone drip back on even though heart rate 40-50's. Patient has an ICD with lower limit pacing set to 40. Dr. Todd Woodward ok with patient's blood pressure in the 49'V systolically.

## 2018-10-18 NOTE — INTERDISCIPLINARY ROUNDS
Interdisciplinary team rounds were held 10/18/2018 with the following team members:Care Management, Nursing, Nurse Practitioner, Palliative Care, Pharmacy, Physician, Respiratory Therapy and Clinical Coordinator. Plan of care discussed. See clinical pathway and/or care plan for interventions and desired outcomes.

## 2018-10-18 NOTE — PROGRESS NOTES
Patient is alert/oriented x 4, able to follow commands appropriately. Sinus Bradycardia noted on monitor, BP 92/68 with HR 53. SpO2 at 99% on 2L NC, chest expansion symmetrical, BLS clear. Abdomen is soft and intact, bowel sounds active. Patient able to void chiki urine -  ml. Pulses present on all extremities. Patient denies pain or discomfort at the moment. Removed IV on right AC due to infiltration. Will continue to monitor closely. Skin assessment complete- no breakdown or redness noted. Lines: 
#20 Left AC #20 Left Wrist 
 
Drips: 
Heparin rate change to 18 units/kg/hr (PTT 70.7) Amiodarone at 0.5 mg/min Drains: 
None

## 2018-10-18 NOTE — PROGRESS NOTES
Patient's heart rate sustaining less than 60 and blood pressure trending down, now 92/67- Amiodarone drip held per parameters. Spoke with Maximilian Hernandez NP. Per Lance Matute, keep amiodarone drip held, and place patient on continuous O2 sat probe. Patient slightly short of breath. O2 sats 95% on 1 L NC. Will continue to monitor.

## 2018-10-18 NOTE — PROGRESS NOTES
Physical Therapy Note: 
 
Participated in interdisciplinary rounds in ICU/CCU and chart reviewed. Patient is experiencing decrease in function from baseline. Patient would benefit from skilled acute therapy to increase independence with self care/ADLs, strength, endurance, and functional mobility. Recommend PT/OT consult when medically stable and MD agrees. Thank you for your consideration, Favio Vaughan DPT

## 2018-10-18 NOTE — PROGRESS NOTES
TRANSFER - OUT REPORT: 
 
Verbal report given to Rosa Bradley RN on Timoteo Florez  being transferred to [phone] for routine progression of care Report consisted of patients Situation, Background, Assessment and  
Recommendations(SBAR). Information from the following report(s) ED Summary was reviewed with the receiving nurse. Lines:  
Peripheral IV 10/16/18 Left Antecubital (Active) Site Assessment Clean, dry, & intact 10/18/2018  7:27 AM  
Phlebitis Assessment 0 10/18/2018  7:27 AM  
Infiltration Assessment 0 10/18/2018  7:27 AM  
Dressing Status Clean, dry, & intact 10/18/2018  7:27 AM  
Dressing Type Transparent;Tape 10/18/2018  7:27 AM  
Hub Color/Line Status Patent; Infusing 10/18/2018  7:27 AM  
Alcohol Cap Used No 10/18/2018  7:27 AM  
   
Peripheral IV 10/17/18 Anterior; Left Wrist (Active) Site Assessment Clean, dry, & intact 10/18/2018  7:27 AM  
Phlebitis Assessment 0 10/18/2018  7:27 AM  
Infiltration Assessment 0 10/18/2018  7:27 AM  
Dressing Status Clean, dry, & intact 10/18/2018  7:27 AM  
Dressing Type Transparent;Tape 10/18/2018  7:27 AM  
Hub Color/Line Status Pink; Infusing;Patent 10/18/2018  7:27 AM  
Alcohol Cap Used No 10/18/2018  7:27 AM  
   
Peripheral IV 10/17/18 Right Arm (Active) Site Assessment Clean, dry, & intact 10/18/2018  7:27 AM  
Phlebitis Assessment 0 10/18/2018  7:27 AM  
Infiltration Assessment 0 10/18/2018  7:27 AM  
Dressing Status Clean, dry, & intact 10/18/2018  7:27 AM  
Dressing Type Transparent;Tape 10/18/2018  7:27 AM  
Hub Color/Line Status Pink;Patent; Infusing 10/18/2018  7:27 AM  
Alcohol Cap Used No 10/18/2018  7:27 AM  
  
 
Opportunity for questions and clarification was provided. Patient transported with: 
 Monitor O2 @ 2 liters

## 2018-10-18 NOTE — PROGRESS NOTES
MD De Santiago called about current lab results: Magnesium 2.1 and Potassium 3.4 Orders received to replace both electrolytes via IVPB.

## 2018-10-18 NOTE — PROGRESS NOTES
[facility] CARDIOLOGY PROGRESS NOTE 
      
 
10/18/2018 7:27 AM 
 
Admit Date: 10/16/2018 Subjective:  
Patient has been stable last 48hrs. Had 11 beat run NSVT at 100-110bpm.  This was asymptomatic. ROS: 
Cardiovascular:  As noted above Objective:  
  
Vitals:  
 10/18/18 6704 10/18/18 9908 10/18/18 1503 10/18/18 1020 BP: (!) 125/97 (!) 138/111 (!) 140/106 115/84 Pulse: 62 66 66 61 Resp: 22 (!) 6 12 Temp:      
SpO2: 95% 100% 100% 100% Weight:      
 
 
Physical Exam: 
General-No Acute Distress Neck- supple, no JVD 
CV- regular rate and rhythm no MRG Lung- clear bilaterally Abd- soft, nontender, nondistended Ext- no edema bilaterally. Skin- warm and dry Data Review:  
Recent Labs 10/18/18 
0328 10/17/18 
1802 10/17/18 
0157  10/16/18 
1941 *  --  138   < >  --   
K 4.0 3.4* 4.0   < >  --   
MG 2.6* 2.1 2.5*   < >  --   
BUN 10  --  9   < >  --   
CREA 1.21  --  0.96   < >  --   
*  --  116*   < >  --   
WBC 8.0  --  7.6  --   --   
HGB 13.7  --  14.2  --   --   
HCT 40.7*  --  42.6  --   --   
*  --  148*  --   --   
TROIQ  --   --  4.09*  --  4.04*  
 < > = values in this interval not displayed. Assessment/Plan:  
 
Principal Problem: 
  Ventricular tachycardia ([address] (10/16/2018) VT storm and ICD shock x 37. Now on amiodarone gtt. Start PO amiodarone 400mg TID. DC IV amiodarone in 24 hrs. Active Problems: 
  Coronary atherosclerosis of native coronary artery (10/14/2009) No angina but diffuse pattern CAD. Review films and determine if repeat LHC keven benefit patient. Mixed hyperlipidemia (10/14/2009) On statin therapy HTN (hypertension), benign (10/14/2009) Elevated currently but has been low. Monitor for now. Increase carvedilol. Chronic systolic heart failure ([facility] [address] (5/17/2011) IV lasix again today and monitor renal function and lytes ICD (implantable cardioverter-defibrillator) discharge (9/13/2016) Normal function. May need to consider changing VT detection as appears to be markedly slower on amiodarone. Hypokalemia (10/16/2018) Replaced Naila Rodriguez MD 
10/18/2018 7:27 AM

## 2018-10-19 LAB
ANION GAP SERPL CALC-SCNC: 13 MMOL/L (ref 7–16)
BUN SERPL-MCNC: 9 MG/DL (ref 8–23)
CALCIUM SERPL-MCNC: 8.1 MG/DL (ref 8.3–10.4)
CHLORIDE SERPL-SCNC: 101 MMOL/L (ref 98–107)
CO2 SERPL-SCNC: 22 MMOL/L (ref 21–32)
CREAT SERPL-MCNC: 1.06 MG/DL (ref 0.8–1.5)
ERYTHROCYTE [DISTWIDTH] IN BLOOD BY AUTOMATED COUNT: 13.4 %
GLUCOSE SERPL-MCNC: 104 MG/DL (ref 65–100)
HCT VFR BLD AUTO: 42.3 % (ref 41.1–50.3)
HGB BLD-MCNC: 14.4 G/DL (ref 13.6–17.2)
MAGNESIUM SERPL-MCNC: 2.1 MG/DL (ref 1.8–2.4)
MCH RBC QN AUTO: 31.6 PG (ref 26.1–32.9)
MCHC RBC AUTO-ENTMCNC: 34 G/DL (ref 31.4–35)
MCV RBC AUTO: 92.8 FL (ref 79.6–97.8)
NRBC # BLD: 0 K/UL (ref 0–0.2)
PLATELET # BLD AUTO: 156 K/UL (ref 150–450)
PMV BLD AUTO: 11.5 FL (ref 9.4–12.3)
POTASSIUM SERPL-SCNC: 3.4 MMOL/L (ref 3.5–5.1)
RBC # BLD AUTO: 4.56 M/UL (ref 4.23–5.6)
SODIUM SERPL-SCNC: 136 MMOL/L (ref 136–145)
WBC # BLD AUTO: 8.1 K/UL (ref 4.3–11.1)

## 2018-10-19 PROCEDURE — 74011250637 HC RX REV CODE- 250/637: Performed by: INTERNAL MEDICINE

## 2018-10-19 PROCEDURE — 86580 TB INTRADERMAL TEST: CPT | Performed by: INTERNAL MEDICINE

## 2018-10-19 PROCEDURE — 74011000258 HC RX REV CODE- 258: Performed by: NURSE PRACTITIONER

## 2018-10-19 PROCEDURE — 74011250636 HC RX REV CODE- 250/636: Performed by: NURSE PRACTITIONER

## 2018-10-19 PROCEDURE — 97161 PT EVAL LOW COMPLEX 20 MIN: CPT

## 2018-10-19 PROCEDURE — 74011250637 HC RX REV CODE- 250/637: Performed by: NURSE PRACTITIONER

## 2018-10-19 PROCEDURE — 74011000302 HC RX REV CODE- 302: Performed by: INTERNAL MEDICINE

## 2018-10-19 PROCEDURE — 83735 ASSAY OF MAGNESIUM: CPT

## 2018-10-19 PROCEDURE — 80048 BASIC METABOLIC PNL TOTAL CA: CPT

## 2018-10-19 PROCEDURE — 65660000000 HC RM CCU STEPDOWN

## 2018-10-19 PROCEDURE — 85027 COMPLETE CBC AUTOMATED: CPT

## 2018-10-19 PROCEDURE — 36415 COLL VENOUS BLD VENIPUNCTURE: CPT

## 2018-10-19 RX ORDER — POTASSIUM CHLORIDE 20 MEQ/1
40 TABLET, EXTENDED RELEASE ORAL
Status: COMPLETED | OUTPATIENT
Start: 2018-10-19 | End: 2018-10-19

## 2018-10-19 RX ORDER — POTASSIUM CHLORIDE 20 MEQ/1
20 TABLET, EXTENDED RELEASE ORAL DAILY
Status: DISCONTINUED | OUTPATIENT
Start: 2018-10-20 | End: 2018-10-21 | Stop reason: HOSPADM

## 2018-10-19 RX ADMIN — ASPIRIN 81 MG 81 MG: 81 TABLET ORAL at 08:15

## 2018-10-19 RX ADMIN — CARVEDILOL 6.25 MG: 6.25 TABLET, FILM COATED ORAL at 17:21

## 2018-10-19 RX ADMIN — Medication 5 ML: at 14:15

## 2018-10-19 RX ADMIN — AMIODARONE HYDROCHLORIDE 0.5 MG/MIN: 50 INJECTION, SOLUTION INTRAVENOUS at 05:05

## 2018-10-19 RX ADMIN — FUROSEMIDE 40 MG: 20 TABLET ORAL at 12:58

## 2018-10-19 RX ADMIN — SPIRONOLACTONE 25 MG: 25 TABLET ORAL at 12:58

## 2018-10-19 RX ADMIN — AMIODARONE HYDROCHLORIDE 400 MG: 200 TABLET ORAL at 08:16

## 2018-10-19 RX ADMIN — AMIODARONE HYDROCHLORIDE 400 MG: 200 TABLET ORAL at 16:21

## 2018-10-19 RX ADMIN — ATORVASTATIN CALCIUM 40 MG: 40 TABLET, FILM COATED ORAL at 08:15

## 2018-10-19 RX ADMIN — AMIODARONE HYDROCHLORIDE 400 MG: 200 TABLET ORAL at 21:27

## 2018-10-19 RX ADMIN — CLOPIDOGREL BISULFATE 75 MG: 75 TABLET ORAL at 08:15

## 2018-10-19 RX ADMIN — CARVEDILOL 6.25 MG: 6.25 TABLET, FILM COATED ORAL at 08:16

## 2018-10-19 RX ADMIN — Medication 10 ML: at 21:34

## 2018-10-19 RX ADMIN — SACUBITRIL AND VALSARTAN 1 TABLET: 24; 26 TABLET, FILM COATED ORAL at 08:15

## 2018-10-19 RX ADMIN — POTASSIUM CHLORIDE 40 MEQ: 20 TABLET, EXTENDED RELEASE ORAL at 10:27

## 2018-10-19 RX ADMIN — Medication 10 ML: at 05:08

## 2018-10-19 RX ADMIN — TUBERCULIN PURIFIED PROTEIN DERIVATIVE 5 UNITS: 5 INJECTION, SOLUTION INTRADERMAL at 14:15

## 2018-10-19 NOTE — PROGRESS NOTES
Verbal bedside report received from Orlando Bailon RN. Assumed care of patient. BP, HR, and Rhythm verified. IV Amiodarone visualized.

## 2018-10-19 NOTE — PROGRESS NOTES
Bedside and Verbal shift change report given to Vonda Ordoñez RN (oncoming nurse) by self Tery Meigs nurse). Report included the following information SBAR, Kardex, Intake/Output, MAR, Recent Results and Cardiac Rhythm Sinus harvey.

## 2018-10-19 NOTE — PROGRESS NOTES
Problem: Falls - Risk of 
Goal: *Absence of Falls Document Joie Treviño Fall Risk and appropriate interventions in the flowsheet. Outcome: Progressing Towards Goal 
Fall Risk Interventions: 
Mobility Interventions: Patient to call before getting OOB Medication Interventions: Patient to call before getting OOB Elimination Interventions: Call light in reach, Urinal in reach Patient progressing towards goal with no falls on current admission. Patient without confusion, agitation, or sensory perception deficits. Patient has steady gait on ambulation. Personal belongings are within reach. Bed is in the low and locked position with side rails up x2. Yellow gripper socks to bilateral feet. Call light within reach and patient verbalizes understanding of use. Problem: Pressure Injury - Risk of 
Goal: *Prevention of pressure injury Document Naldo Scale and appropriate interventions in the flowsheet. Outcome: Progressing Towards Goal 
Pressure Injury Interventions: Activity Interventions: Increase time out of bed Mobility Interventions: HOB 30 degrees or less Nutrition Interventions: Document food/fluid/supplement intake

## 2018-10-19 NOTE — PROGRESS NOTES
Critical Care Outreach Nurse Progress Report: 
 
Subjective: In to assess pt secondary to Yossi GARCÍAWS Score: 2 (10/19/18 8205) Vitals:  
 10/19/18 0903 10/19/18 0943 10/19/18 1029 10/19/18 1125 BP: 113/79 (!) 89/58 (!) 87/54 93/65 Pulse: (!) 45 Resp: 18 Temp: 96.2 °F (35.7 °C) SpO2: 98% Weight:      
  
 
Pain Intensity 1: 0 (10/19/18 0819) Patient Stated Pain Goal: 0 Assessment: Pt in bed, A/O X4, SB on monitor (HR- 44), BP 90s/60s, MAP in the 70s, SpO2- 93% on RA, breath sounds clear. Pt c/o STEVE but not at rest. Per pt: \"It is not unusual for him to have STEVE. Pt denies any other needs, pain at this moment Plan:  Will continue to monitor per ORP

## 2018-10-19 NOTE — PROGRESS NOTES
Verbal bedside report given to Rehan Benitez oncoming RN. Patient's situation, background, assessment and recommendations provided. Opportunity for questions provided. Oncoming RN assumed care of patient. Amiodarone IV drip verified at bedside with oncoming RN.

## 2018-10-19 NOTE — PROGRESS NOTES
Problem: Mobility Impaired (Adult and Pediatric) Goal: *Acute Goals and Plan of Care (Insert Text) LTG: 
(1.)Mr. Armando Rdz will move from supine to sit and sit to supine, scoot up and down and roll side to side INDEPENDENTLY with bed flat within 7 treatment day(s). (2.)Mr. Armando Rdz will transfer from bed to chair and chair to bed INDEPENDENTLY within 7 treatment day(s). (3.)Mr. Armando Rdz will ambulate with MODIFIED INDEPENDENCE for 350+ feet with the least restrictive device within 7 treatment day(s). (4.)Mr. Armando Rdz will perform seated and/or standing exercises per HEP for 15+ minutes to improve strength and mobility within 7 days. ________________________________________________________________________________________________ PHYSICAL THERAPY: Initial Assessment, PM 10/19/2018INPATIENT: Hospital Day: 4 [place] ONLY / Product Type: Medicare /  
  
NAME/AGE/GENDER: Fadi Tidwell is a 72 y.o. male PRIMARY DIAGNOSIS: afib Atrial fibrillation with RVR (HCC) Ventricular tachycardia (HCC) Ventricular tachycardia ([facility] [address] ICD-10: Treatment Diagnosis:  
 · Generalized Muscle Weakness (M62.81) · Difficulty in walking, Not elsewhere classified (R26.2) · Other abnormalities of gait and mobility (R26.89) Precaution/Allergies: 
Patient has no known allergies. ASSESSMENT:  
Mr. Armando Rdz is a 72year old male admitted from home for a fib with RVR, V-tach after his ICD fired several times. He typically lives alone and is independent at baseline without use of any DME. Presents sitting up in bed and reports feeling tired and weak overall. No pain. Transfers to sitting with supervision. LE assessment shows AROM WFL bilaterally with strength grossly 4/5. Sensation intact and equal. Pt performs transfers with SBA.  Ambulates 100 ft in room and hallway with close CGA-occasional min assist. Narrow base of support noted with small, shuffling steps and mild trunk sway at times (one small loss of balance noted initially). Pt fatigues quickly, he reports generalized weakness and mild shortness of breath during activity. Returned to room and up to chair after activity with needs in reach. BP improved (111/80). Positioned comfortably after activity with LEs elevated, needs in reach. Austin Chapa appears to be functioning well below baseline with strength, mobility, balance, and activity tolerance and will benefit from continued therapy during acute care stay to address deficits/maximize independence with mobility. May benefit from rehab at discharge. This section established at most recent assessment PROBLEM LIST (Impairments causing functional limitations): 1. Decreased Strength 2. Decreased ADL/Functional Activities 3. Decreased Transfer Abilities 4. Decreased Ambulation Ability/Technique 5. Decreased Balance 6. Decreased Activity Tolerance 7. Increased Shortness of Breath INTERVENTIONS PLANNED: (Benefits and precautions of physical therapy have been discussed with the patient.) 1. Balance Exercise 2. Bed Mobility 3. Gait Training 4. Home Exercise Program (HEP) 5. Therapeutic Activites 6. Therapeutic Exercise/Strengthening 7. Transfer Training TREATMENT PLAN: Frequency/Duration: 3 times a week for duration of hospital stay Rehabilitation Potential For Stated Goals: Good RECOMMENDED REHABILITATION/EQUIPMENT: (at time of discharge pending progress): Due to the probability of continued deficits (see above) this patient will likely need continued skilled physical therapy after discharge. Equipment:  
? tbd pending progress HISTORY:  
History of Present Injury/Illness (Reason for Referral): 
Per H&P, \"Robbie Zuleta is a 72 y.o.   male with PMHx of CAD (s/p MI with PCI x6 per Pt, [facility] with balloon x2 to ISR dRCA 9/2017), HTN, HLD, sHF (last EF 15% on ECHO, felt 5% on LHC), iCMP (s/p ICD - SJM placed 5/2011) and GERD who presented to the ED at [facility] today after his ICD fired multiple times. States that he had gone to [employer] to get breakfast and had just pulled back into the driveway when his ICD suddenly fired. States it felt like he was hit in the chest with a baseball bat. States was so hard that it caused him to drop the biscuit out of his hand. Denies any preceding and no associated CP/pressure, palpitations, dizziness or syncope, N/V. States that in the ED was given medicine and the ICD discharges stopped. Reports has been doing fairly well recently. However, over past couple weeks has noted increased SOB. States now has difficulty walked to-from mailbox without becoming SOB and having to rest. States not on O2. States has been sleeping on couch because SOB worsens when he lies in bed at night. States no edema. Reports not aware of having any arrhythmias. He does however report frequently noting tachy palpitations that come and go intermittently. Come at rest or with activity. Spontaneously resolve after 3-5 seconds. Has not had ICD discharge with these events.  
  
Per chart review: Pt had device interrogation 8/2018 with a possible VT event that was successfully tx with ATP. Repeat device check 10/2018 with no events and normal function.  
  
In the ED at [facility] he was found on EKG to have narrow complex HR of 194 that was felt to be AFib. He was given 2mg Versed and a 20mg diltiazem bolus with initial improvement. However, HR began to elevate again and he was given additional 5mg diltiazem bolus. Due to critical shortage of diltiazem, he was then given 150mg Amio bolus and started on a gtt. Transfer to [facility] was arranged. \" 
Past Medical History/Comorbidities:  
Mr. 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 Mr. Mark Britton  has a past surgical history that includes hx other surgical; pr cardiac surg procedure unlist; hx heart catheterization; and IMPLANTATION OF CARDIOVERTER DEFIBRILLATOR (N/A, 5/17/2011). Social History/Living Environment:  
Home Environment: Private residence # Steps to Enter: 1 One/Two Story Residence: One story Living Alone: Yes Support Systems: Family member(s) Patient Expects to be Discharged to[de-identified] Unknown Current DME Used/Available at Home: None Prior Level of Function/Work/Activity: 
Lives alone. Independent and active at baseline without DME. Drives. Denies falls. States has been progressively short of breath (more than baseline SOB) for the past several weeks. Number of Personal Factors/Comorbidities that affect the Plan of Care: 1-2: MODERATE COMPLEXITY EXAMINATION:  
Most Recent Physical Functioning:  
Gross Assessment: 
AROM: Within functional limits Strength: Generally decreased, functional 
Coordination: Within functional limits Sensation: Intact Posture: 
Posture (WDL): Exceptions to [facility] Posture Assessment: Forward head, Rounded shoulders Balance: 
Sitting: Intact Standing: Impaired Standing - Static: Fair Standing - Dynamic : Fair Bed Mobility: 
Supine to Sit: Supervision Scooting: Supervision Wheelchair Mobility: 
  
Transfers: 
Sit to Stand: Stand-by assistance Stand to Sit: Stand-by assistance Bed to Chair: Contact guard assistance;Stand-by assistance Gait: 
  
Base of Support: Narrowed Speed/Suellen: Accelerated; Fluctuations Step Length: Left shortened;Right shortened Gait Abnormalities: Trunk sway increased; Path deviations Distance (ft): 100 Feet (ft) Assistive Device: Gait belt(close CGA-min handheld assist) Ambulation - Level of Assistance: Contact guard assistance;Minimal assistance Interventions: Verbal cues; Safety awareness training; Tactile cues Body Structures Involved: 1. Heart 2. Muscles Body Functions Affected: 1. Sensory/Pain 2. Cardio 3. Movement Related Activities and Participation Affected: 1. General Tasks and Demands 2. Mobility 3. Domestic Life 4. Community, Social and [place] Number of elements that affect the Plan of Care: 4+: HIGH COMPLEXITY CLINICAL PRESENTATION:  
Presentation: Stable and uncomplicated: LOW COMPLEXITY CLINICAL DECISION MAKING:  
[facility] [employer] AM-PAC 6 Clicks Basic Mobility Inpatient Short Form How much difficulty does the patient currently have. .. Unable A Lot A Little None 1. Turning over in bed (including adjusting bedclothes, sheets and blankets)? [] 1   [] 2   [] 3   [x] 4  
2. Sitting down on and standing up from a chair with arms ( e.g., wheelchair, bedside commode, etc.)   [] 1   [] 2   [x] 3   [] 4  
3. Moving from lying on back to sitting on the side of the bed? [] 1   [] 2   [] 3   [x] 4 How much help from another person does the patient currently need. .. Total A Lot A Little None 4. Moving to and from a bed to a chair (including a wheelchair)? [] 1   [] 2   [x] 3   [] 4  
5. Need to walk in hospital room? [] 1   [] 2   [x] 3   [] 4  
6. Climbing 3-5 steps with a railing? [] 1   [] 2   [x] 3   [] 4  
© 2007, Trustees of [facility] [employer], under license to [employer]. All rights reserved Score:  Initial: 20 Most Recent: X (Date: -- ) Interpretation of Tool:  Represents activities that are increasingly more difficult (i.e. Bed mobility, Transfers, Gait). Score 24 23 22-20 19-15 14-10 9-7 6 Modifier CH CI CJ CK CL CM CN   
 
? Mobility - Walking and Moving Around:  
  - CURRENT STATUS: CJ - 20%-39% impaired, limited or restricted  - GOAL STATUS: CI - 1%-19% impaired, limited or restricted  - D/C STATUS:  ---------------To be determined--------------- [place] / [place] ONLY / Product Type: Medicare /   
 
Medical Necessity:    
· Patient demonstrates good rehab potential due to higher previous functional level. Reason for Services/Other Comments: 
· Patient continues to demonstrate capacity to improve strength, mobility, balance, transfers, activity tolerance which will increase independence and increase safety. Use of outcome tool(s) and clinical judgement create a POC that gives a: Clear prediction of patient's progress: LOW COMPLEXITY  
  
 
 
 
TREATMENT:  
(In addition to Assessment/Re-Assessment sessions the following treatments were rendered) Pre-treatment Symptoms/Complaints:  \"I just feel weak\" Pain: Initial:  
Pain Intensity 1: 0  Post Session:  0/10 Assessment/Reassessment only, no treatment provided today Braces/Orthotics/Lines/Etc:  
· O2 Device: Nasal cannula Treatment/Session Assessment:   
· Response to Treatment:  Pt performs mobility with CGA-min A in hallway · Interdisciplinary Collaboration:  
o Physical Therapist 
o Registered Nurse · After treatment position/precautions:  
o Up in chair 
o Bed/Chair-wheels locked 
o Bed in low position 
o Call light within reach · Compliance with Program/Exercises: Will assess as treatment progresses. · Recommendations/Intent for next treatment session: \"Next visit will focus on advancements to more challenging activities and reduction in assistance provided\". Total Treatment Duration: PT Patient Time In/Time Out Time In: 1500 Time Out: 4604 Levi Roberts DPT

## 2018-10-19 NOTE — PROGRESS NOTES
Verbal bedside report given to Srinath Presley oncoming RN. Patient's situation, background, assessment and recommendations provided. Opportunity for questions provided. Oncoming RN assumed care of patient. Amio IV drip verified at bedside with oncoming RN.

## 2018-10-19 NOTE — PROGRESS NOTES
Bedside report received from [address], [facility].

## 2018-10-19 NOTE — PROGRESS NOTES
[facility] CARDIOLOGY PROGRESS NOTE 
      
 
10/19/2018 7:27 AM 
 
Admit Date: 10/16/2018 Subjective:  
Patient has been stable last 48hrs. Had 11 beat run NSVT at 100-110bpm yesterday at 10am and no additional events. This was asymptomatic. ROS: 
Cardiovascular:  As noted above Objective:  
  
Vitals:  
 10/19/18 4907 10/19/18 7049 10/19/18 0815 10/19/18 0553 BP: 101/79 117/76 102/84 113/79 Pulse: (!) 53 (!) 53 (!) 54 (!) 45 Resp: 17 17 18 Temp: 96 °F (35.6 °C) 96 °F (35.6 °C)  96.2 °F (35.7 °C) SpO2: 99% 98%  98% Weight:  64.5 kg (142 lb 4.8 oz) Physical Exam: 
General-No Acute Distress Neck- supple, no JVD 
CV- regular rate and rhythm no MRG Lung- clear bilaterally Abd- soft, nontender, nondistended Ext- no edema bilaterally. Skin- warm and dry Data Review:  
Recent Labs 10/19/18 
3942 10/18/18 
0328  10/17/18 
0157  10/16/18 
1941  135*  --  138   < >  --   
K 3.4* 4.0   < > 4.0   < >  --   
MG 2.1 2.6*   < > 2.5*  --   --   
BUN 9 10  --  9   < >  --   
CREA 1.06 1.21  --  0.96   < >  --   
* 107*  --  116*   < >  --   
WBC 8.1 8.0  --  7.6   < >  --   
HGB 14.4 13.7  --  14.2   < >  --   
HCT 42.3 40.7*  --  42.6   < >  --   
 145*  --  148*   < >  --   
TROIQ  --   --   --  4.09*  --  4.04*  
 < > = values in this interval not displayed. Assessment/Plan:  
 
Principal Problem: 
  Ventricular tachycardia ([address] (10/16/2018) VT storm and ICD shock x 37. Now on amiodarone gtt and will stop this AM.  On PO amiodarone 400mg TID. Active Problems: 
  Coronary atherosclerosis of native coronary artery (10/14/2009) No angina but diffuse pattern CAD. Mixed hyperlipidemia (10/14/2009) On statin therapy HTN (hypertension), benign (10/14/2009) Monitor for now. Has some mild low BPs but asymptomatic Chronic systolic heart failure ([facility] [address] (5/17/2011) Monitor renal function and lytes ICD (implantable cardioverter-defibrillator) discharge (9/13/2016) Normal function. Changed VT detection as appears to be markedly slower on amiodarone. Hypokalemia (10/16/2018) Replaced Susanne Bain MD 
10/19/2018 7:27 AM

## 2018-10-19 NOTE — PROGRESS NOTES
CM met with pt, will consider rehab options. List of NH facilities given to pt. PPD ordered. PT ordered. CM to continue to monitor for dc to SNF vs Home with HH. Care Management Interventions PCP Verified by CM: Yes Last Visit to PCP: 09/10/18 Mode of Transport at Discharge: Other (see comment)(Brother Ajay Villanueva) Transition of Care Consult (CM Consult): Discharge Planning, SNF(vs Home health) Discharge Durable Medical Equipment: No 
Physical Therapy Consult: Yes Occupational Therapy Consult: No 
Speech Therapy Consult: No 
Current Support Network: Lives Alone, Family Lives Two Dot Confirm Follow Up Transport: Family Plan discussed with Pt/Family/Caregiver: Yes Freedom of Choice Offered: Yes Discharge Location Discharge Placement: Unable to determine at this time

## 2018-10-19 NOTE — PROGRESS NOTES
Bedside shift change report given to self (oncoming nurse) by Whit Christensen RN (offgoing nurse). Report included the following information SBAR, Kardex, Intake/Output, MAR, Recent Results and Cardiac Rhythm Sinus bradycardia. Amio gtt verified with off going RN. Hourly BP cycling.

## 2018-10-20 LAB
ANION GAP SERPL CALC-SCNC: 10 MMOL/L (ref 7–16)
BUN SERPL-MCNC: 10 MG/DL (ref 8–23)
CALCIUM SERPL-MCNC: 8.5 MG/DL (ref 8.3–10.4)
CHLORIDE SERPL-SCNC: 103 MMOL/L (ref 98–107)
CO2 SERPL-SCNC: 25 MMOL/L (ref 21–32)
CREAT SERPL-MCNC: 1.33 MG/DL (ref 0.8–1.5)
ERYTHROCYTE [DISTWIDTH] IN BLOOD BY AUTOMATED COUNT: 13.5 %
GLUCOSE SERPL-MCNC: 81 MG/DL (ref 65–100)
HCT VFR BLD AUTO: 43.1 % (ref 41.1–50.3)
HGB BLD-MCNC: 14.6 G/DL (ref 13.6–17.2)
MAGNESIUM SERPL-MCNC: 2.1 MG/DL (ref 1.8–2.4)
MCH RBC QN AUTO: 31.2 PG (ref 26.1–32.9)
MCHC RBC AUTO-ENTMCNC: 33.9 G/DL (ref 31.4–35)
MCV RBC AUTO: 92.1 FL (ref 79.6–97.8)
MM INDURATION POC: 0 MM (ref 0–5)
NRBC # BLD: 0 K/UL (ref 0–0.2)
PLATELET # BLD AUTO: 162 K/UL (ref 150–450)
PMV BLD AUTO: 11.6 FL (ref 9.4–12.3)
POTASSIUM SERPL-SCNC: 3.9 MMOL/L (ref 3.5–5.1)
PPD POC: NEGATIVE NEGATIVE
RBC # BLD AUTO: 4.68 M/UL (ref 4.23–5.6)
SODIUM SERPL-SCNC: 138 MMOL/L (ref 136–145)
WBC # BLD AUTO: 9.8 K/UL (ref 4.3–11.1)

## 2018-10-20 PROCEDURE — 65660000000 HC RM CCU STEPDOWN

## 2018-10-20 PROCEDURE — 83735 ASSAY OF MAGNESIUM: CPT

## 2018-10-20 PROCEDURE — 74011250637 HC RX REV CODE- 250/637: Performed by: INTERNAL MEDICINE

## 2018-10-20 PROCEDURE — 36415 COLL VENOUS BLD VENIPUNCTURE: CPT

## 2018-10-20 PROCEDURE — 85027 COMPLETE CBC AUTOMATED: CPT

## 2018-10-20 PROCEDURE — 74011250637 HC RX REV CODE- 250/637: Performed by: NURSE PRACTITIONER

## 2018-10-20 PROCEDURE — 80048 BASIC METABOLIC PNL TOTAL CA: CPT

## 2018-10-20 RX ORDER — AMIODARONE HYDROCHLORIDE 200 MG/1
400 TABLET ORAL EVERY 12 HOURS
Status: DISCONTINUED | OUTPATIENT
Start: 2018-10-20 | End: 2018-10-21 | Stop reason: HOSPADM

## 2018-10-20 RX ORDER — CARVEDILOL 3.12 MG/1
3.12 TABLET ORAL 2 TIMES DAILY WITH MEALS
Status: DISCONTINUED | OUTPATIENT
Start: 2018-10-20 | End: 2018-10-21

## 2018-10-20 RX ADMIN — AMIODARONE HYDROCHLORIDE 400 MG: 200 TABLET ORAL at 21:05

## 2018-10-20 RX ADMIN — SACUBITRIL AND VALSARTAN 1 TABLET: 24; 26 TABLET, FILM COATED ORAL at 21:05

## 2018-10-20 RX ADMIN — Medication 10 ML: at 06:04

## 2018-10-20 RX ADMIN — CARVEDILOL 6.25 MG: 6.25 TABLET, FILM COATED ORAL at 08:50

## 2018-10-20 RX ADMIN — ASPIRIN 81 MG 81 MG: 81 TABLET ORAL at 08:45

## 2018-10-20 RX ADMIN — CARVEDILOL 3.12 MG: 3.12 TABLET, FILM COATED ORAL at 16:58

## 2018-10-20 RX ADMIN — AMIODARONE HYDROCHLORIDE 400 MG: 200 TABLET ORAL at 08:50

## 2018-10-20 RX ADMIN — POTASSIUM CHLORIDE 20 MEQ: 20 TABLET, EXTENDED RELEASE ORAL at 08:45

## 2018-10-20 RX ADMIN — ATORVASTATIN CALCIUM 40 MG: 40 TABLET, FILM COATED ORAL at 08:45

## 2018-10-20 RX ADMIN — Medication 10 ML: at 21:06

## 2018-10-20 RX ADMIN — Medication 5 ML: at 14:10

## 2018-10-20 RX ADMIN — CLOPIDOGREL BISULFATE 75 MG: 75 TABLET ORAL at 08:45

## 2018-10-20 NOTE — PROGRESS NOTES
Problem: Falls - Risk of 
Goal: *Absence of Falls Document [place] Kappa Fall Risk and appropriate interventions in the flowsheet. Outcome: Progressing Towards Goal 
Fall Risk Interventions: 
Mobility Interventions: Patient to call before getting OOB Medication Interventions: Patient to call before getting OOB Elimination Interventions: Call light in reach, Urinal in reach Problem: Pressure Injury - Risk of 
Goal: *Prevention of pressure injury Document Nlado Scale and appropriate interventions in the flowsheet. Outcome: Progressing Towards Goal 
Pressure Injury Interventions: Activity Interventions: Increase time out of bed Mobility Interventions: HOB 30 degrees or less Nutrition Interventions: Document food/fluid/supplement intake

## 2018-10-20 NOTE — PROGRESS NOTES
[facility] OUTREACH NURSE PROGRESS REPORT SUBJECTIVE: Called to assess patient secondary to transfer from CCU. MEWS Score: 2 (10/19/18 1810) Vitals:  
 10/19/18 1449 10/19/18 1621 10/19/18 1810 10/19/18 2053 BP: 96/68 116/77 103/75 91/61 Pulse: (!) 48 (!) 44 (!) 45 (!) 50 Resp:   18 18 Temp:   97.2 °F (36.2 °C) 97.2 °F (36.2 °C) SpO2:   92% 91% Weight:      
  
EKG: unchanged from previous tracings, sinus bradycardia (HR 53). LAB DATA: 
 
Recent Labs 10/19/18 
0324 10/18/18 
0328 10/17/18 
1802 10/17/18 
0157  135*  --  138  
K 3.4* 4.0 3.4* 4.0  
 104  --  109* CO2 22 21  --  20* AGAP 13 10  --  9  
* 107*  --  116* BUN 9 10  --  9  
CREA 1.06 1.21  --  0.96 GFRAA >60 >60  --  >60  
GFRNA >60 >60  --  >60  
CA 8.1* 8.3  --  8.0*  
MG 2.1 2.6* 2.1 2.5* Recent Labs 10/19/18 
2553 10/18/18 
0328 10/17/18 
0157 WBC 8.1 8.0 7.6 HGB 14.4 13.7 14.2 HCT 42.3 40.7* 42.6  145* 148* OBJECTIVE: On arrival to room, I found patient to be resting in bed. Pain Assessment Pain Intensity 1: 0 (10/19/18 1628) Patient Stated Pain Goal: 0 
 
  
  
  
  
 
  
  
  
   
 
ASSESSMENT:  Patient resting in bed A&O x4. RA with SpO2 94%. Lungs clear. Denies SOB or pain. SB on bedside monitor with rate of 53. States he feels weak but was able to walk with physical therapy today. No needs at this time. PLAN: Will continue to follow per ICU outreach protocol.

## 2018-10-20 NOTE — PROGRESS NOTES
Bedside and Verbal shift change report given to self (oncoming nurse) by Teja White RN (offgoing nurse). Report included the following information SBAR, Kardex and MAR.

## 2018-10-20 NOTE — PROGRESS NOTES
Bedside and Verbal shift change report given to Janice Schroeder RN (oncoming nurse) by self Terry skelton). Report included the following information SBAR, Kardex, MAR and Recent Results.

## 2018-10-21 VITALS
TEMPERATURE: 97.2 F | BODY MASS INDEX: 20.7 KG/M2 | DIASTOLIC BLOOD PRESSURE: 67 MMHG | SYSTOLIC BLOOD PRESSURE: 104 MMHG | OXYGEN SATURATION: 96 % | HEART RATE: 52 BPM | RESPIRATION RATE: 18 BRPM | WEIGHT: 140.2 LBS

## 2018-10-21 LAB
ANION GAP SERPL CALC-SCNC: 9 MMOL/L (ref 7–16)
BUN SERPL-MCNC: 13 MG/DL (ref 8–23)
CALCIUM SERPL-MCNC: 8.4 MG/DL (ref 8.3–10.4)
CHLORIDE SERPL-SCNC: 105 MMOL/L (ref 98–107)
CO2 SERPL-SCNC: 24 MMOL/L (ref 21–32)
CREAT SERPL-MCNC: 1.36 MG/DL (ref 0.8–1.5)
ERYTHROCYTE [DISTWIDTH] IN BLOOD BY AUTOMATED COUNT: 13.4 %
GLUCOSE SERPL-MCNC: 89 MG/DL (ref 65–100)
HCT VFR BLD AUTO: 42.8 % (ref 41.1–50.3)
HGB BLD-MCNC: 14.2 G/DL (ref 13.6–17.2)
MAGNESIUM SERPL-MCNC: 2.1 MG/DL (ref 1.8–2.4)
MCH RBC QN AUTO: 31 PG (ref 26.1–32.9)
MCHC RBC AUTO-ENTMCNC: 33.2 G/DL (ref 31.4–35)
MCV RBC AUTO: 93.4 FL (ref 79.6–97.8)
NRBC # BLD: 0 K/UL (ref 0–0.2)
PLATELET # BLD AUTO: 166 K/UL (ref 150–450)
PMV BLD AUTO: 11.4 FL (ref 9.4–12.3)
POTASSIUM SERPL-SCNC: 4 MMOL/L (ref 3.5–5.1)
RBC # BLD AUTO: 4.58 M/UL (ref 4.23–5.6)
SODIUM SERPL-SCNC: 138 MMOL/L (ref 136–145)
WBC # BLD AUTO: 8.3 K/UL (ref 4.3–11.1)

## 2018-10-21 PROCEDURE — 74011250636 HC RX REV CODE- 250/636: Performed by: INTERNAL MEDICINE

## 2018-10-21 PROCEDURE — 74011250637 HC RX REV CODE- 250/637: Performed by: NURSE PRACTITIONER

## 2018-10-21 PROCEDURE — 74011250637 HC RX REV CODE- 250/637: Performed by: INTERNAL MEDICINE

## 2018-10-21 PROCEDURE — 90686 IIV4 VACC NO PRSV 0.5 ML IM: CPT | Performed by: INTERNAL MEDICINE

## 2018-10-21 PROCEDURE — 90471 IMMUNIZATION ADMIN: CPT

## 2018-10-21 PROCEDURE — 83735 ASSAY OF MAGNESIUM: CPT

## 2018-10-21 PROCEDURE — 85027 COMPLETE CBC AUTOMATED: CPT

## 2018-10-21 PROCEDURE — 36415 COLL VENOUS BLD VENIPUNCTURE: CPT

## 2018-10-21 PROCEDURE — 80048 BASIC METABOLIC PNL TOTAL CA: CPT

## 2018-10-21 RX ORDER — POTASSIUM CHLORIDE 20 MEQ/1
20 TABLET, EXTENDED RELEASE ORAL DAILY
Qty: 30 TAB | Refills: 1 | Status: SHIPPED | OUTPATIENT
Start: 2018-10-22 | End: 2018-11-02

## 2018-10-21 RX ORDER — FUROSEMIDE 40 MG/1
40 TABLET ORAL DAILY
Qty: 30 TAB | Refills: 11 | Status: SHIPPED | OUTPATIENT
Start: 2018-10-21 | End: 2018-10-23 | Stop reason: SDUPTHER

## 2018-10-21 RX ORDER — AMIODARONE HYDROCHLORIDE 400 MG/1
400 TABLET ORAL DAILY
Qty: 30 TAB | Refills: 5 | Status: SHIPPED | OUTPATIENT
Start: 2018-10-21 | End: 2018-11-02

## 2018-10-21 RX ORDER — AMIODARONE HYDROCHLORIDE 400 MG/1
400 TABLET ORAL 2 TIMES DAILY
Qty: 8 TAB | Refills: 0 | Status: SHIPPED | OUTPATIENT
Start: 2018-10-21 | End: 2018-10-21 | Stop reason: SDUPTHER

## 2018-10-21 RX ORDER — AMIODARONE HYDROCHLORIDE 400 MG/1
400 TABLET ORAL 2 TIMES DAILY
Qty: 8 TAB | Refills: 0 | Status: SHIPPED | OUTPATIENT
Start: 2018-10-21 | End: 2018-10-25

## 2018-10-21 RX ADMIN — ASPIRIN 81 MG 81 MG: 81 TABLET ORAL at 10:54

## 2018-10-21 RX ADMIN — AMIODARONE HYDROCHLORIDE 400 MG: 200 TABLET ORAL at 10:50

## 2018-10-21 RX ADMIN — SPIRONOLACTONE 25 MG: 25 TABLET ORAL at 10:50

## 2018-10-21 RX ADMIN — INFLUENZA VIRUS VACCINE 0.5 ML: 15; 15; 15; 15 SUSPENSION INTRAMUSCULAR at 11:39

## 2018-10-21 RX ADMIN — ATORVASTATIN CALCIUM 40 MG: 40 TABLET, FILM COATED ORAL at 10:51

## 2018-10-21 RX ADMIN — Medication 10 ML: at 05:38

## 2018-10-21 RX ADMIN — POTASSIUM CHLORIDE 20 MEQ: 20 TABLET, EXTENDED RELEASE ORAL at 10:50

## 2018-10-21 RX ADMIN — FUROSEMIDE 40 MG: 20 TABLET ORAL at 10:54

## 2018-10-21 RX ADMIN — CLOPIDOGREL BISULFATE 75 MG: 75 TABLET ORAL at 10:52

## 2018-10-21 RX ADMIN — SACUBITRIL AND VALSARTAN 1 TABLET: 24; 26 TABLET, FILM COATED ORAL at 10:51

## 2018-10-21 NOTE — DISCHARGE INSTRUCTIONS
Learning About Ventricular Tachycardia  What is ventricular tachycardia? Ventricular tachycardia (say \"zora-TRICK-yuh-ler ifrp-dr-PBV-eric-uh\"), or VT, is a type of fast heart rhythm. It starts in the lower part of the heart (ventricles). Some forms of VT may get worse and lead to ventricular fibrillation. Both conditions can be life-threatening. What causes it? VT may be caused by a heart problem that affects the structure of the heart or the heart muscle. These problems may include a heart attack, a heart defect that you were born with, or inflammation in the heart. Sometimes VT happens after heart surgery. Other heart rhythm problems can also lead to it. Some people with normal hearts have VT. This tends to be less serious. Some medicines, such as those used to treat some types of abnormal heart rhythms, can cause VT. Other causes include electrolyte imbalances and using illegal drugs such as stimulants like cocaine. In some cases, the cause isn't known. What are the symptoms? Symptoms of VT include:  · Palpitations. This is an uncomfortable awareness of the heart beating very fast or not in a regular way. · Feeling dizzy or lightheaded. · Shortness of breath. · Chest pain or pressure. · Near-fainting or fainting. Symptoms happen because the heart beats too fast. It can't pump enough blood to the rest of the body. How is VT diagnosed? Your doctor will do an exam and ask about your health history. Your doctor will also do an electrocardiogram (EKG, ECG). This is a tracing of the electrical activity of your heart. VT can come and go. It may be hard to capture with an EKG at your doctor's office. So the doctor may want you to wear a heart monitor. It records your heart rhythm over a few days. You may have lab tests and a chest X-ray. Your doctor may also recommend other tests. · An echocardiogram looks at the structure of your heart.   · A stress test can show if the heart muscle is getting enough blood or if there are any blockages in the arteries of the heart. · An electrophysiology (EP) study can find specific areas of your heart that may be causing the VT. The results of these tests can help your doctor decide what treatment options you have. How is it treated? Goals of treatment are to:  · Prevent an abnormal heartbeat. · Improve your symptoms. · Prevent cardiac arrest (the heart stops pumping blood) and sudden death. To prevent VT and relieve symptoms, you may take heart rhythm medicines. Some people may have a catheter ablation. This procedure destroys small areas of heart tissue that cause the irregular heartbeat. It may make VT happen less often. Or it may stop VT from happening again. Your doctor may recommend a device that can detect a life-threatening abnormal heartbeat and help restore a normal rhythm. This device might be implanted (ICD, or implantable cardioverter-defibrillator) or worn as a vest.  If you have VT that is not stopping, it is a medical emergency. You may need a shock to try to get your heart back into a normal rhythm. This can be from an automated external defibrillator (AED), by paramedics, or through treatment in an emergency room. A doctor may give you medicines if your condition is stable. Follow-up care is a key part of your treatment and safety. Be sure to make and go to all appointments, and call your doctor if you are having problems. It's also a good idea to know your test results and keep a list of the medicines you take. Where can you learn more? Go to http://raj-savannah.info/. Enter V110 in the search box to learn more about \"Learning About Ventricular Tachycardia. \"  Current as of: March 8, 2018  Content Version: 11.8  © 7816-2419 [employer]. Care instructions adapted under license by [employer] (which disclaims liability or warranty for this information).  If you have questions about a medical condition or this instruction, always ask your healthcare professional. [place] [address] any warranty or liability for your use of this information.

## 2018-10-21 NOTE — PROGRESS NOTES
[facility] CARDIOLOGY PROGRESS NOTE 
 
10/21/2018 11:04 AM 
 
Admit Date: 10/16/2018 Admit Diagnosis: afib;Atrial fibrillation with RVR ([facility] [address] Subjective:  
Stable overnight without angina, CHF, or palpitations. No VT/shocks overnight. No other complaints overnight. Tolerating meds well. BP and HR somewhat low but stable for severe LV dysfunction history (see below). Objective:  
  
Vitals:  
 10/21/18 0041 10/21/18 9181 10/21/18 4827 10/21/18 3768 BP: 91/58 (!) 86/53  104/67 Pulse: (!) 53 (!) 52  (!) 52 Resp: 16 18 18 Temp: 97.2 °F (36.2 °C) 96.8 °F (36 °C)  97.2 °F (36.2 °C) SpO2: 96% 97%  96% Weight:   63.6 kg (140 lb 3.2 oz) Physical Exam: 
Neck- supple, no JVD 
CV- regular rate and rhythm no MRG Lung- clear bilaterally Abd- soft, nontender, nondistended Ext- no edema Skin- warm and dry Data Review:  
Recent Labs 10/21/18 
7807 10/20/18 
5408  138  
K 4.0 3.9 MG 2.1 2.1 BUN 13 10 CREA 1.36 1.33  
GLU 89 81 WBC 8.3 9.8 HGB 14.2 14.6 HCT 42.8 43.1  162 Assessment and Plan:  
 
Principal Problem: 
  [facility] (10/16/2018)- improved, resolved, continue amio as below Active Problems: 
  Coronary atherosclerosis of native coronary artery- stable, no angina, continue meds Mixed hyperlipidemia -stable, continue meds HTN (hypertension), benign -stable, continue meds Chronic systolic heart failure (HCC) - euvolemic, continue meds but decrease as below. ICD (implantable cardioverter-defibrillator) discharge - s/p 37 shocks, resolved, see below Hypokalemia (10/16/2018)- repleted 
 
~ HOME LATER TODAY 
~ TC7 VISIT WITH KALIE MIRANDA TO OVERBOOK IF NEEDED 
~ BP LOW AND WEAK, TIRED WITH RESTING MARISABEL 40-50's INTERMITTENTLY, HOLD COREG AT DISCHARGE, CONTINUE ENTRESTO AND AMIO TAPER, CONSIDER RESUMING LOW DOSE COREG AS TOLERATED AT TC VISIT WITH DR. CONTRERAS. ~ SEND HOME ON AMIODARONE 400MG BID X 4 DAYS, THEN DECREASE TO 400MG DAILY 
~ BMP AND Mg ON Tuesday PRIOR TO DR. CONTRERAS'S FOLLOW UP VISIT ANTONIO Thomas MD 
[facility] Cardiology Pager [phone]

## 2018-10-21 NOTE — PROGRESS NOTES
Bedside and Verbal shift change report given to self (oncoming nurse) by Elver Spence RN (offgoing nurse). Report included the following information SBAR, Kardex, MAR and Recent Results.

## 2018-10-21 NOTE — DISCHARGE SUMMARY
[facility] Cardiology Discharge Summary     Patient ID:  Yuriy Last  [id number]  72 y.o.  1945    Admit date: 10/16/2018    Discharge date: 10/21/18     Admitting Physician: Ra Murillo MD     Discharge Physician: Sanna Snellen, NP/Dr. Mali Lubin    Admission Diagnoses: afib  Atrial fibrillation with RVR [facility])    Discharge Diagnoses:   Patient Active Problem List    Diagnosis Date Noted    Ventricular tachycardia ([facility] [address] 10/16/2018    Hypokalemia 10/16/2018    Unilateral inguinal hernia 01/10/2018    Blue toe syndrome of left lower extremity ([address] 01/10/2018    Pulmonary edema cardiac cause ([address] 09/03/2017    Acute hyperglycemia 09/03/2017    ICD (implantable cardioverter-defibrillator) discharge 09/13/2016    AICD (automatic cardioverter/defibrillator) present 05/18/2016    Chronic systolic heart failure ([facility] [address] 05/17/2011    Coronary atherosclerosis of native coronary artery 10/14/2009    Ischemic cardiomyopathy 10/14/2009    Mixed hyperlipidemia 10/14/2009    HTN (hypertension), benign 10/14/2009       Cardiology Procedures this admission: EKG and PPM interrogation   Consults:     Hospital Course: [facility] complaints of multiple ICD shocks. The patient has also progressive shortness of breath while walking that progressed to at rest.  Patient was evaluated and found to be in VT storm receiving 37 shocks confirmed by device interigation and subsequently admitted to CCU for further cardiac evaluation and treatment. He was started on IV amiodarone by the ED which stopped ICD therapy. The patient was started on IV Lasix for diuresis. He diuresed well and felt better with a total.  During the patients stay he was midly hypokalemic with K being replaced. The morning of 10/21/18  patient was up feeling well without any complaints shortness of breath. LE edema was much improved.   Patient's labs were stable with creatinine of 1.36. Patient was seen and examined by Dr. Maine Juarez and determined stable and ready for discharge. Patient was instructed on the importance of medication compliance, low sodium diet, 2 liter per day fluid restriction and daily weights. For maximized medical therapy of congestive heart failure, patient will continue use of BB and ACE-I. The patient will hold his coreg at discharge due to bradycardia to be re-evaluated in the outpatient setting. He will be discharged on 400 mg amiodarone BID x 4 days, then it will be decreased to 400 mg daily. The patient will have close transitional care follow up with [facility] Cardiology for a [facility] Appointment with Dr Les Johansen that will be scheduled by the office on Monday. The patient will be discharged with a BMP and Mag prior to his appointment with Dr Les Johansen. DISPOSITION: The patient is being discharged home in stable condition on a low saturated fat, low cholesterol and low salt diet. The patient is instructed to advance activities as tolerated to the limit of fatigue or shortness of breath. The patient is informed to monitor daily weights and maintain a 2 liter per day fluid restriction. The patient is instructed to call the office for any shortness of breath, weight gain, or increased peripheral edema. Discharge Exam:   Visit Vitals  /67 (BP 1 Location: Left arm, BP Patient Position: At rest)   Pulse (!) 52   Temp 97.2 °F (36.2 °C)   Resp 18   Wt 63.6 kg (140 lb 3.2 oz)   SpO2 96%   BMI 20.70 kg/m²     Patient has been seen by Dr. Maine Juarez: see his progress note for exam details.     Recent Results (from the past 24 hour(s))   PLEASE READ & DOCUMENT PPD TEST IN 24 HRS    Collection Time: 10/20/18  2:30 PM   Result Value Ref Range    PPD Negative Negative    mm Induration 0 mm   MAGNESIUM    Collection Time: 10/21/18  4:09 AM   Result Value Ref Range    Magnesium 2.1 1.8 - 2.4 mg/dL   CBC W/O DIFF    Collection Time: 10/21/18  4:09 AM   Result Value Ref Range WBC 8.3 4.3 - 11.1 K/uL    RBC 4.58 4.23 - 5.6 M/uL    HGB 14.2 13.6 - 17.2 g/dL    HCT 42.8 41.1 - 50.3 %    MCV 93.4 79.6 - 97.8 FL    MCH 31.0 26.1 - 32.9 PG    MCHC 33.2 31.4 - 35.0 g/dL    RDW 13.4 %    PLATELET 605 137 - 537 K/uL    MPV 11.4 9.4 - 12.3 FL    ABSOLUTE NRBC 0.00 0.0 - 0.2 K/uL   METABOLIC PANEL, BASIC    Collection Time: 10/21/18  4:09 AM   Result Value Ref Range    Sodium 138 136 - 145 mmol/L    Potassium 4.0 3.5 - 5.1 mmol/L    Chloride 105 98 - 107 mmol/L    CO2 24 21 - 32 mmol/L    Anion gap 9 7 - 16 mmol/L    Glucose 89 65 - 100 mg/dL    BUN 13 8 - 23 MG/DL    Creatinine 1.36 0.8 - 1.5 MG/DL    GFR est AA >60 >60 ml/min/1.73m2    GFR est non-AA 55 (L) >60 ml/min/1.73m2    Calcium 8.4 8.3 - 10.4 MG/DL         Patient Instructions:   Current Discharge Medication List      START taking these medications    Details   !! amiodarone (PACERONE) 400 mg tablet Take 1 Tab by mouth daily. Qty: 30 Tab, Refills: 5      furosemide (LASIX) 40 mg tablet Take 1 Tab by mouth daily. Qty: 30 Tab, Refills: 11      potassium chloride (K-DUR, KLOR-CON) 20 mEq tablet Take 1 Tab by mouth daily. Qty: 30 Tab, Refills: 1      !! amiodarone (PACERONE) 400 mg tablet Take 1 Tab by mouth two (2) times a day for 4 days. Qty: 8 Tab, Refills: 0       !! - Potential duplicate medications found. Please discuss with provider. CONTINUE these medications which have NOT CHANGED    Details   sacubitril-valsartan (ENTRESTO) 49 mg/51 mg tablet Take 1 Tab by mouth two (2) times a day. Qty: 180 Tab, Refills: 3      spironolactone (ALDACTONE) 25 mg tablet Take 1 Tab by mouth daily. Qty: 90 Tab, Refills: 3      atorvastatin (LIPITOR) 40 mg tablet Take 1 Tab by mouth daily. Qty: 90 Tab, Refills: 3      aspirin 81 mg chewable tablet Take 1 Tab by mouth daily. Qty: 30 Tab, Refills: 11      clopidogrel (PLAVIX) 75 mg tablet Take 75 mg by mouth daily. Last dose 5-10-11.           STOP taking these medications       carvedilol (COREG) 6.25 mg tablet Comments:   Reason for Stopping:                 Signed:  FIORDALIZA Chavez  10/21/2018  10:35 AM

## 2018-10-21 NOTE — PROGRESS NOTES
Pt D/C home today. No supportive care orders received at D/C and LMSW spoke with pt to offer [facility] support. Pt was not interested at this time. Encouraged to speak with Cardiology office if needs arise for a referral in the future.

## 2018-10-21 NOTE — PROGRESS NOTES
Discharge instructions reviewed with patient. Prescriptions given and med info sheets provided for all new medications. Opportunity for questions provided. Patient voiced understanding of all discharge instructions.

## 2018-10-31 ENCOUNTER — HOSPITAL ENCOUNTER (INPATIENT)
Age: 73
LOS: 2 days | Discharge: ANOTHER HOSPITAL | DRG: 287 | End: 2018-11-02
Attending: INTERNAL MEDICINE | Admitting: INTERNAL MEDICINE
Payer: MEDICARE

## 2018-10-31 PROBLEM — I47.20 VT (VENTRICULAR TACHYCARDIA): Status: ACTIVE | Noted: 2018-10-31

## 2018-10-31 PROCEDURE — 93005 ELECTROCARDIOGRAM TRACING: CPT | Performed by: PHYSICIAN ASSISTANT

## 2018-10-31 PROCEDURE — 65660000000 HC RM CCU STEPDOWN

## 2018-10-31 RX ORDER — ATORVASTATIN CALCIUM 40 MG/1
40 TABLET, FILM COATED ORAL
Status: DISCONTINUED | OUTPATIENT
Start: 2018-11-01 | End: 2018-11-02 | Stop reason: HOSPADM

## 2018-10-31 RX ORDER — ACETAMINOPHEN 325 MG/1
650 TABLET ORAL
Status: DISCONTINUED | OUTPATIENT
Start: 2018-10-31 | End: 2018-11-01 | Stop reason: SDUPTHER

## 2018-10-31 RX ORDER — METOPROLOL SUCCINATE 25 MG/1
25 TABLET, EXTENDED RELEASE ORAL 2 TIMES DAILY
Status: DISCONTINUED | OUTPATIENT
Start: 2018-11-01 | End: 2018-11-02 | Stop reason: HOSPADM

## 2018-10-31 RX ORDER — SODIUM CHLORIDE 0.9 % (FLUSH) 0.9 %
5-10 SYRINGE (ML) INJECTION AS NEEDED
Status: DISCONTINUED | OUTPATIENT
Start: 2018-10-31 | End: 2018-11-02 | Stop reason: HOSPADM

## 2018-10-31 RX ORDER — SPIRONOLACTONE 25 MG/1
25 TABLET ORAL DAILY
Status: DISCONTINUED | OUTPATIENT
Start: 2018-11-01 | End: 2018-11-02 | Stop reason: HOSPADM

## 2018-10-31 RX ORDER — FUROSEMIDE 10 MG/ML
40 INJECTION INTRAMUSCULAR; INTRAVENOUS ONCE
Status: COMPLETED | OUTPATIENT
Start: 2018-11-01 | End: 2018-11-01

## 2018-10-31 RX ORDER — MEXILETINE HYDROCHLORIDE 150 MG/1
150 CAPSULE ORAL EVERY 8 HOURS
Status: DISCONTINUED | OUTPATIENT
Start: 2018-11-01 | End: 2018-11-02 | Stop reason: HOSPADM

## 2018-10-31 RX ORDER — NITROGLYCERIN 0.4 MG/1
0.4 TABLET SUBLINGUAL
Status: DISCONTINUED | OUTPATIENT
Start: 2018-10-31 | End: 2018-11-02 | Stop reason: HOSPADM

## 2018-10-31 RX ORDER — HYDROCODONE BITARTRATE AND ACETAMINOPHEN 7.5; 325 MG/1; MG/1
1 TABLET ORAL
Status: DISCONTINUED | OUTPATIENT
Start: 2018-10-31 | End: 2018-11-01 | Stop reason: SDUPTHER

## 2018-10-31 RX ORDER — CLOPIDOGREL BISULFATE 75 MG/1
75 TABLET ORAL DAILY
Status: DISCONTINUED | OUTPATIENT
Start: 2018-11-01 | End: 2018-11-02 | Stop reason: HOSPADM

## 2018-10-31 RX ORDER — GUAIFENESIN 100 MG/5ML
81 LIQUID (ML) ORAL DAILY
Status: DISCONTINUED | OUTPATIENT
Start: 2018-11-01 | End: 2018-11-02 | Stop reason: HOSPADM

## 2018-10-31 RX ORDER — ENALAPRIL MALEATE 5 MG/1
5 TABLET ORAL 2 TIMES DAILY
Status: DISCONTINUED | OUTPATIENT
Start: 2018-11-01 | End: 2018-11-02 | Stop reason: HOSPADM

## 2018-11-01 ENCOUNTER — APPOINTMENT (OUTPATIENT)
Dept: GENERAL RADIOLOGY | Age: 73
DRG: 287 | End: 2018-11-01
Attending: PHYSICIAN ASSISTANT
Payer: MEDICARE

## 2018-11-01 LAB
ALBUMIN SERPL-MCNC: 3.1 G/DL (ref 3.2–4.6)
ALBUMIN/GLOB SERPL: 0.8 {RATIO} (ref 1.2–3.5)
ALP SERPL-CCNC: 129 U/L (ref 50–136)
ALT SERPL-CCNC: 17 U/L (ref 12–65)
ANION GAP SERPL CALC-SCNC: 11 MMOL/L (ref 7–16)
ANION GAP SERPL CALC-SCNC: 7 MMOL/L (ref 7–16)
AST SERPL-CCNC: 20 U/L (ref 15–37)
ATRIAL RATE: 90 BPM
BASOPHILS # BLD: 0 K/UL (ref 0–0.2)
BASOPHILS NFR BLD: 0 % (ref 0–2)
BILIRUB SERPL-MCNC: 0.4 MG/DL (ref 0.2–1.1)
BNP SERPL-MCNC: 775 PG/ML
BUN SERPL-MCNC: 12 MG/DL (ref 8–23)
BUN SERPL-MCNC: 14 MG/DL (ref 8–23)
CALCIUM SERPL-MCNC: 8.4 MG/DL (ref 8.3–10.4)
CALCIUM SERPL-MCNC: 8.8 MG/DL (ref 8.3–10.4)
CALCULATED P AXIS, ECG09: 87 DEGREES
CALCULATED R AXIS, ECG10: 42 DEGREES
CALCULATED T AXIS, ECG11: -170 DEGREES
CHLORIDE SERPL-SCNC: 105 MMOL/L (ref 98–107)
CHLORIDE SERPL-SCNC: 109 MMOL/L (ref 98–107)
CHOLEST SERPL-MCNC: 171 MG/DL
CO2 SERPL-SCNC: 24 MMOL/L (ref 21–32)
CO2 SERPL-SCNC: 25 MMOL/L (ref 21–32)
CREAT SERPL-MCNC: 1.26 MG/DL (ref 0.8–1.5)
CREAT SERPL-MCNC: 1.41 MG/DL (ref 0.8–1.5)
DIAGNOSIS, 93000: NORMAL
DIFFERENTIAL METHOD BLD: NORMAL
EOSINOPHIL # BLD: 0 K/UL (ref 0–0.8)
EOSINOPHIL NFR BLD: 1 % (ref 0.5–7.8)
ERYTHROCYTE [DISTWIDTH] IN BLOOD BY AUTOMATED COUNT: 13.2 %
GLOBULIN SER CALC-MCNC: 3.7 G/DL (ref 2.3–3.5)
GLUCOSE SERPL-MCNC: 106 MG/DL (ref 65–100)
GLUCOSE SERPL-MCNC: 99 MG/DL (ref 65–100)
HCT VFR BLD AUTO: 43.9 % (ref 41.1–50.3)
HDLC SERPL-MCNC: 55 MG/DL (ref 40–60)
HDLC SERPL: 3.1 {RATIO}
HGB BLD-MCNC: 14.2 G/DL (ref 13.6–17.2)
IMM GRANULOCYTES # BLD: 0 K/UL (ref 0–0.5)
IMM GRANULOCYTES NFR BLD AUTO: 0 % (ref 0–5)
LDLC SERPL CALC-MCNC: 93.8 MG/DL
LIPID PROFILE,FLP: NORMAL
LYMPHOCYTES # BLD: 1.6 K/UL (ref 0.5–4.6)
LYMPHOCYTES NFR BLD: 21 % (ref 13–44)
MAGNESIUM SERPL-MCNC: 2.2 MG/DL (ref 1.8–2.4)
MAGNESIUM SERPL-MCNC: 2.2 MG/DL (ref 1.8–2.4)
MCH RBC QN AUTO: 30.7 PG (ref 26.1–32.9)
MCHC RBC AUTO-ENTMCNC: 32.3 G/DL (ref 31.4–35)
MCV RBC AUTO: 95 FL (ref 79.6–97.8)
MONOCYTES # BLD: 0.5 K/UL (ref 0.1–1.3)
MONOCYTES NFR BLD: 7 % (ref 4–12)
NEUTS SEG # BLD: 5.2 K/UL (ref 1.7–8.2)
NEUTS SEG NFR BLD: 71 % (ref 43–78)
NRBC # BLD: 0 K/UL (ref 0–0.2)
P-R INTERVAL, ECG05: 190 MS
PLATELET # BLD AUTO: 238 K/UL (ref 150–450)
PMV BLD AUTO: 10 FL (ref 9.4–12.3)
POTASSIUM SERPL-SCNC: 3.5 MMOL/L (ref 3.5–5.1)
POTASSIUM SERPL-SCNC: 4.3 MMOL/L (ref 3.5–5.1)
PROT SERPL-MCNC: 6.8 G/DL (ref 6.3–8.2)
Q-T INTERVAL, ECG07: 412 MS
QRS DURATION, ECG06: 130 MS
QTC CALCULATION (BEZET), ECG08: 504 MS
RBC # BLD AUTO: 4.62 M/UL (ref 4.23–5.6)
SODIUM SERPL-SCNC: 140 MMOL/L (ref 136–145)
SODIUM SERPL-SCNC: 141 MMOL/L (ref 136–145)
TRIGL SERPL-MCNC: 111 MG/DL (ref 35–150)
VENTRICULAR RATE, ECG03: 90 BPM
VLDLC SERPL CALC-MCNC: 22.2 MG/DL (ref 6–23)
WBC # BLD AUTO: 7.4 K/UL (ref 4.3–11.1)

## 2018-11-01 PROCEDURE — 74011000258 HC RX REV CODE- 258: Performed by: PHYSICIAN ASSISTANT

## 2018-11-01 PROCEDURE — 4A023N8 MEASUREMENT OF CARDIAC SAMPLING AND PRESSURE, BILATERAL, PERCUTANEOUS APPROACH: ICD-10-PCS | Performed by: INTERNAL MEDICINE

## 2018-11-01 PROCEDURE — 74011250637 HC RX REV CODE- 250/637: Performed by: INTERNAL MEDICINE

## 2018-11-01 PROCEDURE — B2161ZZ FLUOROSCOPY OF RIGHT AND LEFT HEART USING LOW OSMOLAR CONTRAST: ICD-10-PCS | Performed by: INTERNAL MEDICINE

## 2018-11-01 PROCEDURE — 74011250636 HC RX REV CODE- 250/636: Performed by: PHYSICIAN ASSISTANT

## 2018-11-01 PROCEDURE — 85025 COMPLETE CBC W/AUTO DIFF WBC: CPT

## 2018-11-01 PROCEDURE — 77030004559 HC CATH ANGI DX SUPT CARD -B

## 2018-11-01 PROCEDURE — 77030004558 HC CATH ANGI DX SUPR TORQ CARD -A

## 2018-11-01 PROCEDURE — 74011636320 HC RX REV CODE- 636/320: Performed by: INTERNAL MEDICINE

## 2018-11-01 PROCEDURE — 74011250636 HC RX REV CODE- 250/636: Performed by: INTERNAL MEDICINE

## 2018-11-01 PROCEDURE — 80053 COMPREHEN METABOLIC PANEL: CPT

## 2018-11-01 PROCEDURE — C1894 INTRO/SHEATH, NON-LASER: HCPCS

## 2018-11-01 PROCEDURE — 77030019569 HC BND COMPR RAD TERU -B

## 2018-11-01 PROCEDURE — 83880 ASSAY OF NATRIURETIC PEPTIDE: CPT

## 2018-11-01 PROCEDURE — 71046 X-RAY EXAM CHEST 2 VIEWS: CPT

## 2018-11-01 PROCEDURE — 83735 ASSAY OF MAGNESIUM: CPT

## 2018-11-01 PROCEDURE — 77030013687 HC GD NDL BARD -B

## 2018-11-01 PROCEDURE — 74011250637 HC RX REV CODE- 250/637: Performed by: PHYSICIAN ASSISTANT

## 2018-11-01 PROCEDURE — 74011250636 HC RX REV CODE- 250/636

## 2018-11-01 PROCEDURE — 93460 R&L HRT ART/VENTRICLE ANGIO: CPT

## 2018-11-01 PROCEDURE — C1751 CATH, INF, PER/CENT/MIDLINE: HCPCS

## 2018-11-01 PROCEDURE — 80061 LIPID PANEL: CPT

## 2018-11-01 PROCEDURE — B2111ZZ FLUOROSCOPY OF MULTIPLE CORONARY ARTERIES USING LOW OSMOLAR CONTRAST: ICD-10-PCS | Performed by: INTERNAL MEDICINE

## 2018-11-01 PROCEDURE — 65660000000 HC RM CCU STEPDOWN

## 2018-11-01 PROCEDURE — 76937 US GUIDE VASCULAR ACCESS: CPT

## 2018-11-01 PROCEDURE — 99152 MOD SED SAME PHYS/QHP 5/>YRS: CPT

## 2018-11-01 PROCEDURE — 74011000250 HC RX REV CODE- 250: Performed by: INTERNAL MEDICINE

## 2018-11-01 PROCEDURE — 36415 COLL VENOUS BLD VENIPUNCTURE: CPT

## 2018-11-01 PROCEDURE — 99153 MOD SED SAME PHYS/QHP EA: CPT

## 2018-11-01 RX ORDER — ACETAMINOPHEN 325 MG/1
650 TABLET ORAL
Status: DISCONTINUED | OUTPATIENT
Start: 2018-11-01 | End: 2018-11-02 | Stop reason: HOSPADM

## 2018-11-01 RX ORDER — MIDAZOLAM HYDROCHLORIDE 1 MG/ML
.5-5 INJECTION, SOLUTION INTRAMUSCULAR; INTRAVENOUS
Status: DISCONTINUED | OUTPATIENT
Start: 2018-11-01 | End: 2018-11-02 | Stop reason: HOSPADM

## 2018-11-01 RX ORDER — FUROSEMIDE 10 MG/ML
20 INJECTION INTRAMUSCULAR; INTRAVENOUS 2 TIMES DAILY
Status: DISCONTINUED | OUTPATIENT
Start: 2018-11-01 | End: 2018-11-02 | Stop reason: HOSPADM

## 2018-11-01 RX ORDER — SODIUM CHLORIDE 0.9 % (FLUSH) 0.9 %
5-10 SYRINGE (ML) INJECTION EVERY 8 HOURS
Status: DISCONTINUED | OUTPATIENT
Start: 2018-11-01 | End: 2018-11-02 | Stop reason: HOSPADM

## 2018-11-01 RX ORDER — POTASSIUM CHLORIDE 20 MEQ/1
40 TABLET, EXTENDED RELEASE ORAL 2 TIMES DAILY
Status: DISPENSED | OUTPATIENT
Start: 2018-11-01 | End: 2018-11-02

## 2018-11-01 RX ORDER — SODIUM CHLORIDE 0.9 % (FLUSH) 0.9 %
5-10 SYRINGE (ML) INJECTION AS NEEDED
Status: DISCONTINUED | OUTPATIENT
Start: 2018-11-01 | End: 2018-11-02 | Stop reason: HOSPADM

## 2018-11-01 RX ORDER — NALOXONE HYDROCHLORIDE 0.4 MG/ML
0.4 INJECTION, SOLUTION INTRAMUSCULAR; INTRAVENOUS; SUBCUTANEOUS AS NEEDED
Status: DISCONTINUED | OUTPATIENT
Start: 2018-11-01 | End: 2018-11-02 | Stop reason: HOSPADM

## 2018-11-01 RX ORDER — HYDROCODONE BITARTRATE AND ACETAMINOPHEN 5; 325 MG/1; MG/1
1 TABLET ORAL
Status: DISCONTINUED | OUTPATIENT
Start: 2018-11-01 | End: 2018-11-02 | Stop reason: HOSPADM

## 2018-11-01 RX ORDER — SODIUM CHLORIDE 9 MG/ML
75 INJECTION, SOLUTION INTRAVENOUS CONTINUOUS
Status: DISCONTINUED | OUTPATIENT
Start: 2018-11-01 | End: 2018-11-02 | Stop reason: HOSPADM

## 2018-11-01 RX ORDER — MORPHINE SULFATE 2 MG/ML
1 INJECTION, SOLUTION INTRAMUSCULAR; INTRAVENOUS
Status: DISCONTINUED | OUTPATIENT
Start: 2018-11-01 | End: 2018-11-02 | Stop reason: HOSPADM

## 2018-11-01 RX ORDER — HEPARIN SODIUM 10000 [USP'U]/ML
40-80 INJECTION, SOLUTION INTRAVENOUS; SUBCUTANEOUS
Status: DISCONTINUED | OUTPATIENT
Start: 2018-11-01 | End: 2018-11-02 | Stop reason: HOSPADM

## 2018-11-01 RX ORDER — HEPARIN SODIUM 200 [USP'U]/100ML
3 INJECTION, SOLUTION INTRAVENOUS CONTINUOUS
Status: DISCONTINUED | OUTPATIENT
Start: 2018-11-01 | End: 2018-11-02 | Stop reason: HOSPADM

## 2018-11-01 RX ORDER — LIDOCAINE HYDROCHLORIDE 10 MG/ML
5-40 INJECTION INFILTRATION; PERINEURAL
Status: DISCONTINUED | OUTPATIENT
Start: 2018-11-01 | End: 2018-11-02 | Stop reason: HOSPADM

## 2018-11-01 RX ORDER — ONDANSETRON 2 MG/ML
4 INJECTION INTRAMUSCULAR; INTRAVENOUS
Status: DISCONTINUED | OUTPATIENT
Start: 2018-11-01 | End: 2018-11-02 | Stop reason: HOSPADM

## 2018-11-01 RX ORDER — FENTANYL CITRATE 50 UG/ML
25-100 INJECTION, SOLUTION INTRAMUSCULAR; INTRAVENOUS
Status: DISCONTINUED | OUTPATIENT
Start: 2018-11-01 | End: 2018-11-02 | Stop reason: HOSPADM

## 2018-11-01 RX ADMIN — ATORVASTATIN CALCIUM 40 MG: 40 TABLET, FILM COATED ORAL at 00:04

## 2018-11-01 RX ADMIN — MIDAZOLAM HYDROCHLORIDE 1 MG: 1 INJECTION, SOLUTION INTRAMUSCULAR; INTRAVENOUS at 15:42

## 2018-11-01 RX ADMIN — AMIODARONE HYDROCHLORIDE 1 MG/MIN: 50 INJECTION, SOLUTION INTRAVENOUS at 00:40

## 2018-11-01 RX ADMIN — IOPAMIDOL 105 ML: 755 INJECTION, SOLUTION INTRAVENOUS at 16:34

## 2018-11-01 RX ADMIN — ASPIRIN 81 MG: 81 TABLET, CHEWABLE ORAL at 08:39

## 2018-11-01 RX ADMIN — Medication 10 ML: at 22:33

## 2018-11-01 RX ADMIN — CLOPIDOGREL BISULFATE 75 MG: 75 TABLET ORAL at 08:39

## 2018-11-01 RX ADMIN — HEPARIN SODIUM 3000 UNITS: 10000 INJECTION INTRAVENOUS; SUBCUTANEOUS at 15:45

## 2018-11-01 RX ADMIN — AMIODARONE HYDROCHLORIDE 0.5 MG/MIN: 50 INJECTION, SOLUTION INTRAVENOUS at 10:20

## 2018-11-01 RX ADMIN — AMIODARONE HYDROCHLORIDE 150 MG: 50 INJECTION, SOLUTION INTRAVENOUS at 00:23

## 2018-11-01 RX ADMIN — METOPROLOL SUCCINATE 25 MG: 25 TABLET, EXTENDED RELEASE ORAL at 08:39

## 2018-11-01 RX ADMIN — HEPARIN SODIUM 3 ML/HR: 5000 INJECTION, SOLUTION INTRAVENOUS; SUBCUTANEOUS at 16:32

## 2018-11-01 RX ADMIN — MIDAZOLAM HYDROCHLORIDE 1 MG: 1 INJECTION, SOLUTION INTRAMUSCULAR; INTRAVENOUS at 15:49

## 2018-11-01 RX ADMIN — POTASSIUM CHLORIDE 40 MEQ: 20 TABLET, EXTENDED RELEASE ORAL at 08:40

## 2018-11-01 RX ADMIN — MEXILETINE HYDROCHLORIDE 150 MG: 150 CAPSULE ORAL at 08:39

## 2018-11-01 RX ADMIN — LIDOCAINE HYDROCHLORIDE 3 ML: 10 INJECTION, SOLUTION INFILTRATION; PERINEURAL at 16:33

## 2018-11-01 RX ADMIN — FUROSEMIDE 20 MG: 10 INJECTION, SOLUTION INTRAMUSCULAR; INTRAVENOUS at 08:39

## 2018-11-01 RX ADMIN — MEXILETINE HYDROCHLORIDE 150 MG: 150 CAPSULE ORAL at 00:04

## 2018-11-01 RX ADMIN — FUROSEMIDE 40 MG: 10 INJECTION, SOLUTION INTRAMUSCULAR; INTRAVENOUS at 00:04

## 2018-11-01 RX ADMIN — ENALAPRIL MALEATE 5 MG: 5 TABLET ORAL at 08:39

## 2018-11-01 RX ADMIN — HEPARIN SODIUM 2 ML: 10000 INJECTION INTRAVENOUS; SUBCUTANEOUS at 15:45

## 2018-11-01 RX ADMIN — LIDOCAINE HYDROCHLORIDE 5 ML: 10 INJECTION, SOLUTION INFILTRATION; PERINEURAL at 16:32

## 2018-11-01 RX ADMIN — FENTANYL CITRATE 25 MCG: 50 INJECTION, SOLUTION INTRAMUSCULAR; INTRAVENOUS at 15:42

## 2018-11-01 RX ADMIN — ATORVASTATIN CALCIUM 40 MG: 40 TABLET, FILM COATED ORAL at 22:33

## 2018-11-01 RX ADMIN — SPIRONOLACTONE 25 MG: 25 TABLET ORAL at 08:40

## 2018-11-01 NOTE — PROGRESS NOTES
Applied TR-band to right wrist with 15 mL of air in band. Site without bleeding or hematoma. Capillary refill distal to the site was less than 2 seconds. Patient instructed to limit movement of affected wrist. Patient verbalized understanding.

## 2018-11-01 NOTE — H&P
[facility] CARDIOLOGY History &Physical                 Primary Cardiologist:  [facility] GARDENIA RODRÍGUEZ JR. [facility]    Primary Care Physician: Dr Brad Sanchez    Admitting Physician: Dr Christina Dunlap    Subjective:     Patient is a 72 y.o. male who presents with VT and ICD discharge. He has a h/o CAD (s/p MI with PCI x6 per Pt), LHC with balloon x2 to ISR dRCA 9/2017), HTN, tobacco use, HLD, sHF (last EF 15% on ECHO, felt 5% on LHC), iCMP (s/p ICD - SJM placed 5/2011) and was recently admitted to the CCU of [facility] w 37 ICD shocks for VT. He was started on IV amiodarone and diuresed with IV lasix and his VT resolved. He was seen by EP and felt a LHC/RHC might be considered, w aggressive CHF management and BB, with consideration for VT ablation or LVAD. He was discharged 10-21 on po amiodarone load. He was seen yesterday by Dr Romelia Felty and his VT1 zone changed for aggressive ATP with high dose amio continued. He presented to [facility]. w two ICD discharges. He was transferred to [facility] for further management. Minimal records sent with patient. , cr 1.26, trop .06, BP here 143/72, HR 73.  Pt states he has not taken his plavix or amiodarone or diuretics for a week.          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 ([facility] [address] 1/10/2018    CAD (coronary artery disease)     stent    Chronic systolic heart failure ([facility]     Coronary atherosclerosis of native coronary artery     GERD (gastroesophageal reflux disease)     Hypertension     Ischemic cardiomyopathy     Tobacco use disorder 5/18/2016      Past Surgical History:   Procedure Laterality Date    CARDIAC SURG PROCEDURE UNLIST      6 stents; last one put in 2 years ago october 2009    HX HEART CATHETERIZATION      2009 october    HX OTHER SURGICAL      scrotum      No Known Allergies  Social History     Tobacco Use    Smoking status: Light Tobacco Smoker     Packs/day: 0.10     Years: 20.00     Pack years: 2.00     Last attempt to quit: 2011     Years since quittin.4    Smokeless tobacco: Never Used   Substance Use Topics    Alcohol use: Yes     Comment: occasionally      FH:   Family History   Problem Relation Age of Onset    Heart Disease Father     Cancer Sister         Review of Systems  General: no weight gain, no weakness, fever or chills  Skin: no rashes, lumps, or other skin changes  HEENT: no headache, dizziness, lightheadedness, vision changes, hearing changes, tinnitus, vertigo, sinus pressure/pain, bleeding gums, sore throat, or hoarseness  Neck: no swollen glands, goiter, pain or stiffness  Respiratory: no cough, sputum, hemoptysis, no dyspnea, no wheezing  Cardiovascular: + as per HPI  Gastrointestinal: no reflux, constipation, diarrhea, liver problems, GI bleeding  Urinary: no frequency, urgency , hematuria, burning/pain with urination, recent flank pain, polyuria, nocturia, or difficulty urinating  Peripheral Vascular: no claudication, leg cramps, prior DVTs, swelling of calves, legs, or feet, color change, or swelling with redness or tenderness  Musculoskeletal: no muscle or joint pain/stiffness, joint swelling, erythema of joints, or back pain  Psychiatric: no depression or excessive stress  Neurological: no sensory or motor loss, seizures, syncope, tremors, numbness, tingling, no changes in mood, attention, or speech, no changes in orientation, memory, insight, or judgment. Hematologic: no anemia, easy bruising or bleeding  Endocrine: no thyroid problems, no heat or cold intolerance, excessive sweating, polyuria, polydipsia, no diabetes. Objective:       Visit Vitals  /72   Pulse 73   Temp 98.5 °F (36.9 °C)   Resp 16   Ht 5' 9\" (1.753 m)   Wt 63.7 kg (140 lb 8 oz)   SpO2 96%   BMI 20.75 kg/m²       No intake/output data recorded. No intake/output data recorded.     Physical Exam:  General: Well Developed, Well Nourished, No Acute Distress  HEENT: pupils equal and round, no abnormalities noted  Neck: supple, no JVD, no carotid bruits  Heart: S1S2 with RRR without murmurs or gallops  Lungs: Clear throughout auscultation bilaterally without adventitious sounds  Abd: soft, nontender, nondistended, with good bowel sounds  Ext: warm, no edema, calves supple/nontender, pulses 2+ bilaterally  Skin: warm and dry  Psychiatric: Normal mood and affect  Neurologic: Alert and oriented X 3      Data Review:    As per HPI and on chart       Assessment/Plan:   Ventricular tachycardia ([facility] [address] (10/16/2018)- Pt with recurrent VT and ICD discharge despite po amiodarone, had been off BB. Will admit, check labs, CXR, EKG, monitor on tele, restart IV amiodarone, po mexitil, BB, consider LHC and RHC in AM    Coronary atherosclerosis of native coronary artery (10/14/2009)- LHC with balloon x2 to ISR dRCA 9/2017- cont ASA, BB, statin- plan for LHC in AM     Ischemic cardiomyopathy (10/14/2009)- EF 5% s/p SJ ICD, check CXR, , cont toprol, ACE and aldactone     Mixed hyperlipidemia (10/14/2009)- statin     HTN (hypertension), benign (10/14/2009)- BB     Jose Garcia PA-C  10/31/2018  10:42 PM    I have personally seen and examined patient and agree with above assessment. I agree and confirm with findings with additional details/exceptions as listed below:    Presented with recurrent ICD discharges for appropriate ventricular tachycardia. Admission about a couple weeks ago for VT storm; was loaded with IV amiodarone. Appears beta blocker therapy on discharge was withheld with bradycardia/hypotension. Has underlying single-lead ICD. Last for a coronary angiogram in 9/17 with noted occluded mid LAD with collaterals, Circumflex system with patent stents and moderate residual disease, angioplasty to the stent in the distal RCA; Echo at that time with ejection fraction of around 15%.     With recurrent episodes, denies any symptoms prior. Has had 3 episodes within the last 3 days with 2 noted on the day of presentation. Was sitting on his couch. Denies any PND/orthopnea/chest discomfort. Prior history of noncompliance and patient uncertain of which medications he is taking (appears has missed doses of meds the last few days). No significant weight gain. Exam currently with no significant volume overload. Ideally, maximize beta blocker therapy with recurrent VT in addition to AAD. Plan Toprol-XL for more blood pressure room with prior issues with hypotension; hold entresto for BP room. Plan low dose ACEI instead (appears unlikely taken entresto as well and prior issues with hypotension-reassess entresto as outpt as BPs tolerate); continue aldactone/lasix (plan IV today and start po in am). Start IV amiodarone; also initiate mexiletine. Check electrolytes/TFTs. Plan repeat coronary angiogram to rule out progression in coronary disease; likely scar related but progression in CAD would contribute. Exam with no significant volume overload to suggest decompensation being a trigger. If no reversible etiology, consideration for VT ablation. EKG also noted on this visit with left bundle branch block with QRS duration of 130ms (prior incomplete LBBB); consideration for upgrade to biventricular pacer (currently single lead ICD;  appears NYHA II-IIIC at baseline with sx) , which would also give more room for guideline directed beta blocker therapy. Plan EP evaluation in a.m.  Further recommendations pending clinical course        Areli Orellana MD  10/31/2018  11:52 PM

## 2018-11-01 NOTE — PROGRESS NOTES
Care Management Interventions  PCP Verified by CM: Yes(states has not seen in over a year)  Palliative Care Criteria Met (RRAT>21 & CHF Dx)?: No(RRAT 16 Dx V-tach)  Transition of Care Consult (CM Consult): Discharge Planning  Discharge Durable Medical Equipment: No(cane and walker)  Physical Therapy Consult: No  Occupational Therapy Consult: No  Speech Therapy Consult: No  Current Support Network: Lives with Spouse, Lives Alone  Confirm Follow Up Transport: Self  Plan discussed with Pt/Family/Caregiver: Yes  Freedom of Choice Offered: Yes  Discharge Location  Discharge Placement: Home  Met with patient for d/c planning. Patient alert and oriented x 3, independent of ADL's and lives alone. He is able to drive and DME include walker and cane. He has Marcel Saxena but does not have Medicare Part D for medications. Provided patient with information on Medicare Part D and importance of obtaining prescription plan. Patient states he is planning on getting [place] managed plan and will see about their prescription plan. Patient PCP is Dr. Juan Sargent but he states has been over a year since he has seen him. Patient plans to d/c home when medically stable. CM following.

## 2018-11-01 NOTE — PROGRESS NOTES
TRANSFER - IN REPORT:    Verbal report received from Dmitri Painting RN on Louie Escamilla being received from [address] [place] [address] for ordered procedure      Report consisted of patients Situation, Background, Assessment and Recommendations(SBAR). Information from the following report(s) Procedure Summary was reviewed with the receiving nurse. Opportunity for questions and clarification was provided. Assessment completed upon patients arrival to unit and care assumed.

## 2018-11-01 NOTE — PROGRESS NOTES
TRANSFER - IN REPORT:    Verbal report received from [facility] RN(name) on Alexa Muse  being received from cath lab(unit) for routine progression of care      Report consisted of patients Situation, Background, Assessment and   Recommendations(SBAR). Information from the following report(s) Procedure Summary was reviewed with the receiving nurse. Opportunity for questions and clarification was provided. Assessment completed upon patients arrival to unit and care assumed.

## 2018-11-01 NOTE — PROGRESS NOTES
Patient transported to room 316. Right IJ and right radial assessed with Surinder Georges RN. No bleeding or hematoma noted.

## 2018-11-01 NOTE — PROGRESS NOTES
10/06/17 1000   Over the last 2 weeks, how often have you been bothered by any of the following problems?   Little interest or pleasure in doing things 3   Feeling down, depressed, or hopeless 3   Trouble falling or staying asleep, or sleeping too much 3   Feeling tired or having little energy 3   Poor appetite or overeating 3   Feeling bad about yourself - or that you are a failure or have let yourself or your family down 3   Trouble concentrating on things, such as reading the newspaper or watching television 3   Moving or speaking so slowly that other people could have noticed. Or the opposite - being so fidgety or restless that you have been moving around a lot more than usual 3   Thoughts that you would be better off dead, or of hurting yourself in some way 3   PHQ-9 Total Score 27   If you checked off any problems, how difficult have these problems made it for you to do your work, take care of things at home, or get along with other people? Extremely dIfficult      TRANSFER - IN REPORT:    Verbal report received from [facility] RICARDA, RN(name) on Stacy Milian  being received from [facility], Emergency Dept.(unit) for routine progression of care      Report consisted of patients Situation, Background, Assessment and   Recommendations(SBAR). Information from the following report(s) ED Summary was reviewed with the receiving nurse. Opportunity for questions and clarification was provided. Assessment completed upon patients arrival to unit and care assumed. Received to CV-Telemetry Unit, via stretcher per EMS Mobile [facility]. Telemetry Germán applied. Admitted to room 316, oriented to room, surroundings and staff, voiced understanding. Instructed and demonstrated use of hand held call-light and Bed rail controls. Assisted out of street clothing and dressed in [facility] [address], applied nonskid socks to feet. Head to toe skin assessment completed. Skin warm, dry and intact. No skin abrasions, no skin tears or break-down noted.

## 2018-11-01 NOTE — PROGRESS NOTES
7 FR venous sheath removed from right IJ using sterile technique. Manual pressure held over site for 15 minutes. Hemostasis achieved at 1745. Right IJ without bleeding without hematoma. Sterile gauze placed over site and secured with tegaderm. . Patient instructed to keep neck straight and still and head on pillow. Patient verbalizes understanding.

## 2018-11-01 NOTE — CONSULTS
[facility] CARDIOLOGY  [address], [address]  PHONE: [phone]        18    NAME:  Kya Crawford  : 1945  MRN: [id number]     Consult Request: Siobhan Corbin MD      ASSESSMENT and PLAN:  Diagnoses and all orders for this visit:     VT Storm   SVT  Atherosclerosis of native coronary artery of native heart with stable angina pectoris ([address]  Ischemic cardiomyopathy, EF 15-20%, NYHA class IIIb Heart Failure  Chronic obstructive CAD  History of SC ICD      Mr. Genoveva Lechuga is a 72year old male with advanced CAD with a history of PCIx6 who was admitted in VT storm. His SC ICD ([facility]) confirmed 49 episodes of MMVT with 37 shocks a week ago and now he is readmitted with recurrent ICD shocks. He actually looked to have SVT with an inappropriate shock this time. He is now on IV amiodarone and mexilitine.     -IV amiodarone with oral transition  -Consider LHC/RHC for further workup, will perform today.  -If goes into sustained VT, get 12 lead ECG if able. -Keep magnet in patient room.   -Aggressive HF mgmt, elevated neck veins and cool to lukewarm on exam.    -Ensure K>4.0 and Mg >/= 2.5 (higher range due to VT storm). -Maximize beta blockers. -If recurrent VT storm, plan to intubate and start benzos. -Consider VT ablation if VT is refractory to medical therapy based on [facility] VT and [facility] trial evidence. -Referral for LVAD?? EF 5%! (EF reduction over baseline likely related to ventricular stunning from shock therapy, would repeat limited TTE in 24-48 hours).      Thank you for allowing me to participate in the electrophysiologic care of Mr. yKa Crawford. Please contact me if any questions or concerns were to arise.     Mannie Tarango MD, MS  Clinical Cardiac Electrophysiology  [facility] Cardiology  10/17/18  10:00 AM     ===================================================================  Chief Complant:  No chief complaint on file.        Consultation is requested by Hang Mane MD     History:  Yuriy Last is a most pleasant 72 y.o. male with a past medical and cardiac history significant for ICM, severe obstructive CAD s/p SC ICD (SJM) who was admitted after getting shocked over 30 times while he was at a [employer] a week ago. He now comes back in with recurrent VT. On device interrogation, it actually appeared to be SVT but his discriminators failed to abort a shock and he was shocked twice. He is doing ok today with stable vital signs and he remains electrically stable. .      Cardiac PMH: (Old records have been reviewed and summarized below)     EKG:  (EKG has been independently visualized by me with interpretation below): NSR, QRS notching and old IMI. QRSd = 110 msec.     ECHO: 9/3/2017  -  Left ventricle: The ventricle was moderately dilated. Systolic function   Was severely reduced. Ejection fraction was estimated to be 15 %. There was   Severe diffuse hypokinesis. There was mild concentric hypertrophy. -  Right ventricle: Systolic function was reduced. -  Tricuspid valve: There was mild regurgitation.     Previous Heart Catheterization: 9/4/2017  1. Left ventricle: Left ventriculogram not obtained. The patient   is hypotensive upon presentation, blood pressure in the 60s. EDP   is measured at 9. There is no aortic valve gradient. The patient   is given intravenous Senthil-Synephrine and IV fluids with   improvement in systolic blood pressure. Review of echocardiogram   demonstrates severely dilated left ventricular cavity with   severe LV systolic dysfunction, ejection fraction is 5%. This   demonstrates a reduction over the last year when EF was   estimated at 15-20%. 2. Left main: Left main is moderate in size, bifurcates in the   LAD and circumflex system is angiographically normal.   3. Left anterior descending coronary: This is a moderate-sized   vessel. The LAD is occluded in the mid portion. There is a large   LAD septal system.  The LAD and diagonal appear to fill by   collaterals. They are quite small and somewhat atretic in   nature. There is no identifiable origin off the LAD at the time   of his angioplasty. There are 3 large septals which primarily   originated off the LAD system. There is a small proximal   diagonal which remains patent. 4. Left circumflex coronary: This is a moderate-sized vessel. The proximal portion has been stented into the second obtuse   marginal. The first obtuse marginal has been jailed. There   is approximately 50% ostial first obtuse marginal. The distal   edge of the stent appears to have a 40-50% lesion present. 5. Right coronary: Large anatomically dominant vessel. It has a   stent in the mid portion which remains widely patent. There is a   stent in the distal portion which has a focal 85% eccentric   lesion present. It bifurcates into the posterior descending and   posterolateral branches. The posterior descending branch is a   long vessel, but diffusely diseased on the order of 50% to 60%. The posterolateral branch is a moderate-sized vessel. It has   minimal luminal irregularities.      DETAILS: The patient was anticoagulated with Angiomax. A 6-  [employer] JR4 guide was utilized. A [employer] wire was advanced   distally. The lesion was dilated with 3.0 x 15 NC Euphora   balloon on 2 separate occasions with near resolution of the   focal in-stent restenosis. No additional stenting was performed. There is significant concern for medical noncompliance in this   patient.      CONCLUSION:   1. Severe LV systolic dysfunction with an end-diastolic pressure   9 mmHg, which suggests the patient is likely volume depleted at   this time. 2. Chronically occluded mid LAD that appears to supply a   diagonal. The LAD diagonal is somewhat atretic in nature with   poor collateralization. I do not see any origin off the LAD   proper and the septals are patent.  This appears to be chronic in   nature and recommend ongoing medical therapy. 3. The left circumflex remains patent. The first obtuse marginal   has been jailed and appears to have a 50% ostial stenosis. There   is also a focal 50% stenosis just distal to the stent that   extends into the second obtuse marginal. These will be managed   medically at this time. 4. Right coronary: The patient has 2 prior stents, one in the   mid and distal. There is an eccentric ulcerated-appearing the   area within the distal placed stent. This is status post balloon   angioplasty, with satisfactory angiographic result. He has   diffuse moderate pattern disease in the right posterior   descending and a relatively normal posterolateral branch. The   right coronary is the largest of his 3 epicardial vessels.        DEVICE INTERROGATION: [facility] [place] ICD. All capture thresholds, lead sensing and impedance measurements are stable and consistent with a normal functioning device. Battery life is stable. 49 VT events, 37 shocks on 10/15/2017. Discriminators with morphology mismatch and EGMs consistent with MMVT with TCL at 315 msec/190 bpm.       Past Medical History, Past Surgical History, Family history, Social History, and Medications were all reviewed with the patient today and updated as necessary.      Current Facility-Administered Medications   Medication Dose Route Frequency Provider Last Rate Last Dose    aspirin chewable tablet 81 mg  81 mg Oral DAILY [facility]        atorvastatin (LIPITOR) tablet 40 mg  40 mg Oral QHS [facility] [place]   40 mg at 11/01/18 0004    enalapril (VASOTEC) tablet 5 mg  5 mg Oral BID [facility] [place]        clopidogrel (PLAVIX) tablet 75 mg  75 mg Oral DAILY [facility]        spironolactone (ALDACTONE) tablet 25 mg  25 mg Oral DAILY [facility]        sodium chloride (NS) flush 5-10 mL  5-10 mL IntraVENous PRN Dion [place], [place]        nitroglycerin (NITROSTAT) tablet 0.4 mg  0.4 mg SubLINGual Q5MIN PRN Dion Cummins PA        acetaminophen (TYLENOL) tablet 650 mg  650 mg Oral Q4H PRN JONO Sarmiento        HYDROcodone-acetaminophen (NORCO) 7.5-325 mg per tablet 1 Tab  1 Tab Oral Q4H PRN JONO Sarmiento        promethazine (PHENERGAN) with saline injection 12.5 mg  12.5 mg IntraVENous Q6H PRN JONO Sarmiento        mexiletine (MEXITIL) capsule 150 mg  150 mg Oral Q8H Anni Lyon, [place]   150 mg at 11/01/18 0004    amiodarone (CORDARONE) 450 mg in dextrose 5% 250 mL infusion  0.5-1 mg/min IntraVENous CONTINUOUS JONO Sarmiento 16.7 mL/hr at 11/01/18 0729 0.5 mg/min at 11/01/18 0729    metoprolol succinate (TOPROL-XL) XL tablet 25 mg  25 mg Oral BID JONO Sarmiento         No Known Allergies  Patient Active Problem List    Diagnosis    VT (ventricular tachycardia) (HCC)    Ventricular tachycardia (HCC)    Hypokalemia    Unilateral inguinal hernia    Blue toe syndrome of left lower extremity (HCC)    Pulmonary edema cardiac cause ([address]    Acute hyperglycemia     secondary to respiratory and stress ?  new onset DM       ICD (implantable cardioverter-defibrillator) discharge    AICD (automatic cardioverter/defibrillator) present    Chronic systolic heart failure (HCC)    Coronary atherosclerosis of native coronary artery    Ischemic cardiomyopathy    Mixed hyperlipidemia    HTN (hypertension), benign       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 ([address] 1/10/2018    CAD (coronary artery disease)     stent    Chronic systolic heart failure ([address]     Coronary atherosclerosis of native coronary artery     GERD (gastroesophageal reflux disease)     Hypertension     Ischemic cardiomyopathy     Non-nicotine vapor product user     Tobacco use disorder 5/18/2016     Past Surgical History:   Procedure Laterality Date    CARDIAC SURG PROCEDURE UNLIST 6 stents; last one put in 2 years ago 2009    HX HEART CATHETERIZATION      2009    HX OTHER SURGICAL      scrotum    HX PACEMAKER       Family History   Problem Relation Age of Onset    Heart Disease Father     Cancer Sister      Social History     Tobacco Use    Smoking status: Former Smoker     Packs/day: 0.50     Years: 20.00     Pack years: 10.00     Last attempt to quit: 10/1/2018     Years since quittin.0    Smokeless tobacco: Never Used    Tobacco comment: quit smoking approx. 30 days ago. Substance Use Topics    Alcohol use: Yes     Alcohol/week: 3.6 oz     Types: 6 Cans of beer per week     Comment: quit alcohol intake approx. 1 month ago. ROS:  A comprehensive review of systems was performed with the pertinent positives and negatives as noted in the HPI in addition to:    Review of Systems   Constitutional: Positive for malaise/fatigue. HENT: Negative. Eyes: Negative. Respiratory: Negative. Cardiovascular: Negative. Gastrointestinal: Negative. Genitourinary: Negative. Musculoskeletal: Negative. Skin: Negative. Neurological: Negative. Endo/Heme/Allergies: Negative. Psychiatric/Behavioral: Negative. PHYSICAL EXAM:     Visit Vitals  /81   Pulse 60   Temp 98 °F (36.7 °C)   Resp 16   Ht 5' 9\" (1.753 m)   Wt 135 lb 4.8 oz (61.4 kg)   SpO2 97%   BMI 19.98 kg/m²        Wt Readings from Last 3 Encounters:   18 135 lb 4.8 oz (61.4 kg)   10/30/18 141 lb (64 kg)   10/21/18 140 lb 3.2 oz (63.6 kg)     BP Readings from Last 3 Encounters:   18 119/81   10/30/18 128/76   10/21/18 104/67       Gen: Well appearing, well developed, no acute distress  Eyes: Pupils equal, round.  Extraocular movements are intact  ENT: Oropharynx clear, no oral lesions, normal dentition  CV: S1S2, regular rate and rhythm, no murmurs, rubs or gallops, JVP to mandible, luke warm on exam.  Pulm: Clear to auscultation bilaterally, no accessory muscle uses, no wheezes or rales  GI: Soft, NT, ND, +BS  Neuro: Alert and oriented, nonfocal  Psych: Appropriate affect  Skin: Normal color and skin turgor  MSK: Normal muscle bulk and tone    Medical problems and test results were reviewed with the patient today. Results for orders placed or performed during the hospital encounter of 10/31/18   LIPID PANEL   Result Value Ref Range    LIPID PROFILE          Cholesterol, total 171 <200 MG/DL    Triglyceride 111 35 - 150 MG/DL    HDL Cholesterol 55 40 - 60 MG/DL    LDL, calculated 93.8 <100 MG/DL    VLDL, calculated 22.2 6.0 - 23.0 MG/DL    CHOL/HDL Ratio 3.1     CBC WITH AUTOMATED DIFF   Result Value Ref Range    WBC 7.4 4.3 - 11.1 K/uL    RBC 4.62 4.23 - 5.6 M/uL    HGB 14.2 13.6 - 17.2 g/dL    HCT 43.9 41.1 - 50.3 %    MCV 95.0 79.6 - 97.8 FL    MCH 30.7 26.1 - 32.9 PG    MCHC 32.3 31.4 - 35.0 g/dL    RDW 13.2 %    PLATELET 104 100 - 973 K/uL    MPV 10.0 9.4 - 12.3 FL    ABSOLUTE NRBC 0.00 0.0 - 0.2 K/uL    DF AUTOMATED      NEUTROPHILS 71 43 - 78 %    LYMPHOCYTES 21 13 - 44 %    MONOCYTES 7 4.0 - 12.0 %    EOSINOPHILS 1 0.5 - 7.8 %    BASOPHILS 0 0.0 - 2.0 %    IMMATURE GRANULOCYTES 0 0.0 - 5.0 %    ABS. NEUTROPHILS 5.2 1.7 - 8.2 K/UL    ABS. LYMPHOCYTES 1.6 0.5 - 4.6 K/UL    ABS. MONOCYTES 0.5 0.1 - 1.3 K/UL    ABS. EOSINOPHILS 0.0 0.0 - 0.8 K/UL    ABS. BASOPHILS 0.0 0.0 - 0.2 K/UL    ABS. IMM. GRANS. 0.0 0.0 - 0.5 K/UL   METABOLIC PANEL, COMPREHENSIVE   Result Value Ref Range    Sodium 141 136 - 145 mmol/L    Potassium 4.3 3.5 - 5.1 mmol/L    Chloride 109 (H) 98 - 107 mmol/L    CO2 25 21 - 32 mmol/L    Anion gap 7 7 - 16 mmol/L    Glucose 106 (H) 65 - 100 mg/dL    BUN 14 8 - 23 MG/DL    Creatinine 1.41 0.8 - 1.5 MG/DL    GFR est AA >60 >60 ml/min/1.73m2    GFR est non-AA 53 (L) >60 ml/min/1.73m2    Calcium 8.4 8.3 - 10.4 MG/DL    Bilirubin, total 0.4 0.2 - 1.1 MG/DL    ALT (SGPT) 17 12 - 65 U/L    AST (SGOT) 20 15 - 37 U/L    Alk.  phosphatase 129 50 - 136 U/L Protein, total 6.8 6.3 - 8.2 g/dL    Albumin 3.1 (L) 3.2 - 4.6 g/dL    Globulin 3.7 (H) 2.3 - 3.5 g/dL    A-G Ratio 0.8 (L) 1.2 - 3.5     METABOLIC PANEL, BASIC   Result Value Ref Range    Sodium 140 136 - 145 mmol/L    Potassium 3.5 3.5 - 5.1 mmol/L    Chloride 105 98 - 107 mmol/L    CO2 24 21 - 32 mmol/L    Anion gap 11 7 - 16 mmol/L    Glucose 99 65 - 100 mg/dL    BUN 12 8 - 23 MG/DL    Creatinine 1.26 0.8 - 1.5 MG/DL    GFR est AA >60 >60 ml/min/1.73m2    GFR est non-AA 60 (L) >60 ml/min/1.73m2    Calcium 8.8 8.3 - 10.4 MG/DL   MAGNESIUM   Result Value Ref Range    Magnesium 2.2 1.8 - 2.4 mg/dL   MAGNESIUM   Result Value Ref Range    Magnesium 2.2 1.8 - 2.4 mg/dL   BNP   Result Value Ref Range     pg/mL   EKG, 12 LEAD, INITIAL   Result Value Ref Range    Ventricular Rate 90 BPM    Atrial Rate 90 BPM    P-R Interval 190 ms    QRS Duration 130 ms    Q-T Interval 412 ms    QTC Calculation (Bezet) 504 ms    Calculated P Axis 87 degrees    Calculated R Axis 42 degrees    Calculated T Axis -170 degrees    Diagnosis       Sinus rhythm with occasional Premature ventricular complexes  Non-specific intra-ventricular conduction block  T wave abnormality, consider inferolateral ischemia  Abnormal ECG  When compared with ECG of 04-SEP-2017 14:08,  Premature ventricular complexes are now Present  Vent.  rate has increased BY  33 BPM  Non-specific intra-ventricular conduction block has replaced Incomplete left   bundle branch block         Lab Results   Component Value Date/Time    Potassium 3.5 11/01/2018 03:49 AM     Lab Results   Component Value Date/Time    Creatinine 1.26 11/01/2018 03:49 AM     Lab Results   Component Value Date/Time    HGB 14.2 11/01/2018 12:16 AM     Lab Results   Component Value Date/Time    INR 1.0 05/10/2011 02:07 PM    Prothrombin time 10.6 05/10/2011 02:07 PM

## 2018-11-01 NOTE — PROGRESS NOTES
Verbal Transfer report received VIA phone [phone] (call back telephone number at [facility]) Emergency Department. Spoke with Primary Nurse [facility], RN. [facility] to transfer patient to [facility].

## 2018-11-01 NOTE — PROGRESS NOTES
TRANSFER - OUT REPORT:    Verbal report given to Nell Darling RN(name) on Fadi Tidwell  being transferred to cpru(unit) for routine progression of care       Report consisted of patients Situation, Background, Assessment and   Recommendations(SBAR). Information from the following report(s) SBAR was reviewed with the receiving nurse.    has No Known Allergies. Opportunity for questions and clarification was provided. Procedure Summary:Pt had R/LHC. R wrist has R band with 15 ml at 1630 hrs. R IJ has 7 fr sheath in place and covered with clear dsg. Med Administration    Versed:  2 mg  Fentanyl: 25 mcg  Heparin: 3000 units      Visit Vitals  /57 (BP 1 Location: Left leg, BP Patient Position: Supine)   Pulse (!) 50   Temp 98.1 °F (36.7 °C)   Resp 17   Ht 5' 9\" (1.753 m)   Wt 61.4 kg (135 lb 4.8 oz)   SpO2 96%   BMI 19.98 kg/m²     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 ([facility] 1/10/2018    CAD (coronary artery disease)     stent    Chronic systolic heart failure ([facility]     Coronary atherosclerosis of native coronary artery     GERD (gastroesophageal reflux disease)     Hypertension     Ischemic cardiomyopathy     Non-nicotine vapor product user     Tobacco use disorder 5/18/2016           Peripheral IV 11/01/18 Right;Upper; Outer Antecubital (Active)   Site Assessment Clean, dry, & intact 11/1/2018 12:01 PM   Phlebitis Assessment 0 11/1/2018 12:01 PM   Infiltration Assessment 0 11/1/2018 12:01 PM   Dressing Status Clean, dry, & intact 11/1/2018 12:01 PM   Dressing Type Transparent;Tape 11/1/2018  5:54 AM   Hub Color/Line Status Infusing 11/1/2018  7:29 AM   Alcohol Cap Used No 11/1/2018  5:54 AM       Peripheral IV 10/31/18 Anterior;Left;Mid Forearm (Active)   Site Assessment Clean, dry, & intact 11/1/2018 12:01 PM   Phlebitis Assessment 0 11/1/2018 12:01 PM Infiltration Assessment 0 11/1/2018 12:01 PM   Dressing Status Clean, dry, & intact 11/1/2018 12:01 PM   Dressing Type Transparent;Tape 11/1/2018  5:54 AM   Hub Color/Line Status Pink;Flushed;Patent 11/1/2018  5:54 AM   Alcohol Cap Used No 11/1/2018  5:54 AM

## 2018-11-01 NOTE — PROGRESS NOTES
TRANSFER - OUT REPORT:    Verbal report given to Jose RN(name) on Adelso Plasencia  being transferred to tele(unit) for routine progression of care       Report consisted of patients Situation, Background, Assessment and   Recommendations(SBAR). Information from the following report(s) Procedure Summary was reviewed with the receiving nurse. Lines:   Peripheral IV 11/01/18 Right;Upper; Outer Antecubital (Active)   Site Assessment Clean, dry, & intact 11/1/2018 12:01 PM   Phlebitis Assessment 0 11/1/2018 12:01 PM   Infiltration Assessment 0 11/1/2018 12:01 PM   Dressing Status Clean, dry, & intact 11/1/2018 12:01 PM   Dressing Type Transparent;Tape 11/1/2018  5:54 AM   Hub Color/Line Status Infusing 11/1/2018  7:29 AM   Alcohol Cap Used No 11/1/2018  5:54 AM       Peripheral IV 10/31/18 Anterior;Left;Mid Forearm (Active)   Site Assessment Clean, dry, & intact 11/1/2018 12:01 PM   Phlebitis Assessment 0 11/1/2018 12:01 PM   Infiltration Assessment 0 11/1/2018 12:01 PM   Dressing Status Clean, dry, & intact 11/1/2018 12:01 PM   Dressing Type Transparent;Tape 11/1/2018  5:54 AM   Hub Color/Line Status Pink;Flushed;Patent 11/1/2018  5:54 AM   Alcohol Cap Used No 11/1/2018  5:54 AM        Opportunity for questions and clarification was provided.       Patient transported with:   Registered Nurse

## 2018-11-01 NOTE — PROCEDURES
Brief Cardiac Procedure Note    Patient: Hans Ramos MRN: [id number]  SSN: [id number]    YOB: 1945  Age: 72 y.o. Sex: male      Date of Procedure: 11/1/2018     Pre-procedure Diagnosis: Congestive Heart Failure, Coronary Artery Disease and VT    Post-procedure Diagnosis: Congestive Heart Failure, Coronary Artery Disease and VT    Reason for Procedure: Cardiac Arrhythmia, Cardiomyopathy and Stable Known CAD    Procedure: Right and Left Heart Catheterization    Brief Description of Procedure: LHC via R radial artery, RHC via Right IJ. Performed By: Kristin Murdock MD     Assistants: None    Anesthesia: Moderate Sedation    Estimated Blood Loss: Less than 10 mL      Specimens: None    Implants: None    Findings: LM normal, LAD with 100% mid to distal LAD, D2 is occluded with L to L collateral flow, RCA with mild to moderate non-obstructive disease (40% ISR). LVEDP 6 mmHg, LVEF 5-10% with global HK. RHC: PCWP mean 3 mmHg, PA 27/5 mmHg, RA 4 mmHg, CO Thermo 1.97 L/min, Antonina 9.05 L/min    Complications: None    Recommendations: Continue medical therapy.     Signed By: Kristin Murodck MD     November 1, 2018

## 2018-11-02 VITALS
TEMPERATURE: 98.3 F | OXYGEN SATURATION: 97 % | HEART RATE: 54 BPM | SYSTOLIC BLOOD PRESSURE: 105 MMHG | WEIGHT: 133.7 LBS | BODY MASS INDEX: 19.8 KG/M2 | HEIGHT: 69 IN | RESPIRATION RATE: 18 BRPM | DIASTOLIC BLOOD PRESSURE: 70 MMHG

## 2018-11-02 LAB
ANION GAP SERPL CALC-SCNC: 8 MMOL/L (ref 7–16)
ATRIAL RATE: 57 BPM
BUN SERPL-MCNC: 14 MG/DL (ref 8–23)
CALCIUM SERPL-MCNC: 8.9 MG/DL (ref 8.3–10.4)
CALCULATED P AXIS, ECG09: 84 DEGREES
CALCULATED R AXIS, ECG10: 43 DEGREES
CALCULATED T AXIS, ECG11: 143 DEGREES
CHLORIDE SERPL-SCNC: 104 MMOL/L (ref 98–107)
CO2 SERPL-SCNC: 25 MMOL/L (ref 21–32)
CREAT SERPL-MCNC: 1.18 MG/DL (ref 0.8–1.5)
DIAGNOSIS, 93000: NORMAL
GLUCOSE SERPL-MCNC: 84 MG/DL (ref 65–100)
MAGNESIUM SERPL-MCNC: 2.2 MG/DL (ref 1.8–2.4)
P-R INTERVAL, ECG05: 192 MS
POTASSIUM SERPL-SCNC: 3.9 MMOL/L (ref 3.5–5.1)
Q-T INTERVAL, ECG07: 472 MS
QRS DURATION, ECG06: 126 MS
QTC CALCULATION (BEZET), ECG08: 459 MS
SODIUM SERPL-SCNC: 137 MMOL/L (ref 136–145)
VENTRICULAR RATE, ECG03: 57 BPM

## 2018-11-02 PROCEDURE — 83735 ASSAY OF MAGNESIUM: CPT

## 2018-11-02 PROCEDURE — 74011000258 HC RX REV CODE- 258: Performed by: PHYSICIAN ASSISTANT

## 2018-11-02 PROCEDURE — 80048 BASIC METABOLIC PNL TOTAL CA: CPT

## 2018-11-02 PROCEDURE — 74011250636 HC RX REV CODE- 250/636: Performed by: PHYSICIAN ASSISTANT

## 2018-11-02 PROCEDURE — 36415 COLL VENOUS BLD VENIPUNCTURE: CPT

## 2018-11-02 PROCEDURE — 74011250637 HC RX REV CODE- 250/637: Performed by: INTERNAL MEDICINE

## 2018-11-02 PROCEDURE — 74011250637 HC RX REV CODE- 250/637: Performed by: PHYSICIAN ASSISTANT

## 2018-11-02 PROCEDURE — 93005 ELECTROCARDIOGRAM TRACING: CPT | Performed by: INTERNAL MEDICINE

## 2018-11-02 PROCEDURE — 74011250636 HC RX REV CODE- 250/636: Performed by: INTERNAL MEDICINE

## 2018-11-02 RX ORDER — AMIODARONE HYDROCHLORIDE 200 MG/1
400 TABLET ORAL 2 TIMES DAILY
Status: DISCONTINUED | OUTPATIENT
Start: 2018-11-02 | End: 2018-11-02 | Stop reason: HOSPADM

## 2018-11-02 RX ORDER — MEXILETINE HYDROCHLORIDE 150 MG/1
150 CAPSULE ORAL EVERY 8 HOURS
Qty: 90 CAP | Refills: 0 | Status: SHIPPED
Start: 2018-11-02 | End: 2021-09-08

## 2018-11-02 RX ORDER — ENALAPRIL MALEATE 5 MG/1
5 TABLET ORAL 2 TIMES DAILY
Qty: 60 TAB | Refills: 0 | Status: SHIPPED
Start: 2018-11-02 | End: 2018-11-14

## 2018-11-02 RX ORDER — METOPROLOL SUCCINATE 25 MG/1
25 TABLET, EXTENDED RELEASE ORAL 2 TIMES DAILY
Qty: 60 TAB | Refills: 0 | Status: SHIPPED
Start: 2018-11-02 | End: 2019-05-02 | Stop reason: SDUPTHER

## 2018-11-02 RX ORDER — FUROSEMIDE 10 MG/ML
20 INJECTION INTRAMUSCULAR; INTRAVENOUS 2 TIMES DAILY
Qty: 1 VIAL | Refills: 0 | Status: SHIPPED
Start: 2018-11-02 | End: 2018-11-14

## 2018-11-02 RX ORDER — AMIODARONE HYDROCHLORIDE 400 MG/1
400 TABLET ORAL 2 TIMES DAILY
Qty: 2 TAB | Refills: 0 | Status: SHIPPED
Start: 2018-11-02 | End: 2018-11-03

## 2018-11-02 RX ADMIN — ASPIRIN 81 MG: 81 TABLET, CHEWABLE ORAL at 09:52

## 2018-11-02 RX ADMIN — AMIODARONE HYDROCHLORIDE 0.5 MG/MIN: 50 INJECTION, SOLUTION INTRAVENOUS at 03:09

## 2018-11-02 RX ADMIN — ENALAPRIL MALEATE 5 MG: 5 TABLET ORAL at 10:00

## 2018-11-02 RX ADMIN — AMIODARONE HYDROCHLORIDE 400 MG: 200 TABLET ORAL at 09:52

## 2018-11-02 RX ADMIN — METOPROLOL SUCCINATE 12.5 MG: 25 TABLET, EXTENDED RELEASE ORAL at 09:52

## 2018-11-02 RX ADMIN — FUROSEMIDE 20 MG: 10 INJECTION, SOLUTION INTRAMUSCULAR; INTRAVENOUS at 09:52

## 2018-11-02 RX ADMIN — SPIRONOLACTONE 25 MG: 25 TABLET ORAL at 09:52

## 2018-11-02 RX ADMIN — CLOPIDOGREL BISULFATE 75 MG: 75 TABLET ORAL at 09:54

## 2018-11-02 RX ADMIN — MEXILETINE HYDROCHLORIDE 150 MG: 150 CAPSULE ORAL at 09:52

## 2018-11-02 NOTE — PROGRESS NOTES
TRANSFER - OUT REPORT:    Verbal report given to Jessica Clifton on Ben Estimable  being transferred to [address] [place] [address] for routine progression of care       Report consisted of patients Situation, Background, Assessment and Recommendations(SBAR). Information from the following report(s) SBAR, Kardex, ED Summary, Recent Results and Cardiac Rhythm SB was reviewed with the receiving nurse. Opportunity for questions and clarification was provided.

## 2018-11-02 NOTE — PROGRESS NOTES
Care Management Interventions  PCP Verified by CM: Yes(states has not seen in over a year)  Palliative Care Criteria Met (RRAT>21 & CHF Dx)?: No(RRAT 16 Dx V-tach)  Mode of Transport at Discharge: BLS([facility])  Transition of Care Consult (CM Consult): Other([facility] Higher level of care)  Discharge Durable Medical Equipment: No  Physical Therapy Consult: No  Occupational Therapy Consult: No  Speech Therapy Consult: No  Current Support Network: Lives with Spouse, Lives Alone  Confirm Follow Up Transport: Self  Plan discussed with Pt/Family/Caregiver: Yes  Freedom of Choice Offered: Yes  Discharge Location  Discharge Placement: Transferred to higher level of care(Transferred to [address])  Patient is to be transferred to [address] by [facility] ambulance for higher level of care. Patient in agreement with plan and packet sent with [facility] staff with H/P, Progress notes, Demographics, MAR, Labs, [place], CD disc and EMTALA form completed.

## 2018-11-02 NOTE — DISCHARGE SUMMARY
[facility] Cardiology Discharge Summary     Patient ID:  Nimo Holt  [id number]  72 y.o.  1945    Admit date: 10/31/2018    Discharge date:  11/2/18    Admitting Physician: Zara Helm MD     Discharge Physician: Trang Durand NP/Dr. Trevino [facility]    Admission Diagnoses: Chest Pain  VT (ventricular tachycardia) [facility])    Discharge Diagnoses:   Patient Active Problem List    Diagnosis Date Noted    VT (ventricular tachycardia) ([facility] 10/31/2018    Ventricular tachycardia ([address] 10/16/2018    Hypokalemia 10/16/2018    Unilateral inguinal hernia 01/10/2018    Blue toe syndrome of left lower extremity ([facility] [address] [facility] 01/10/2018    Pulmonary edema cardiac cause ([facility] [address] [facility] 09/03/2017    Acute hyperglycemia 09/03/2017    ICD (implantable cardioverter-defibrillator) discharge 09/13/2016    AICD (automatic cardioverter/defibrillator) present 05/18/2016    Chronic systolic heart failure ([facility] [address] 05/17/2011    Coronary atherosclerosis of native coronary artery 10/14/2009    Ischemic cardiomyopathy 10/14/2009    Mixed hyperlipidemia 10/14/2009    HTN (hypertension), benign 10/14/2009       Cardiology Procedures this admission:  LHC/RHC  Consults: EP cardiology     Hospital Course: Patient is a 72 y.o. male who presents with VT and ICD discharge. He has a h/o CAD (s/p MI with PCI x6 per Pt), LHC with balloon x2 to ISR dRCA 9/2017), HTN, tobacco use, HLD, sHF (last EF 15% on ECHO, felt 5% on LHC), iCMP (s/p ICD - SJM placed 5/2011) and was recently admitted to the CCU of [facility] 10-16 w 37 ICD shocks for VT. He was started on IV amiodarone and diuresed with IV lasix and his VT resolved. He was seen by EP and felt a LHC/RHC might be considered, w aggressive CHF management and BB, with consideration for VT ablation or LVAD. He was discharged 10-21 on po amiodarone load. He was seen by Dr Efren Luna outpatient on 10/30/18 and his VT1 zone changed for aggressive ATP with high dose amio continued.    He presented to [facility]  w two ICD discharges. He was transferred to [facility] for further management. Minimal records sent with patient. , cr 1.26, trop .06, BP here 143/72, HR 73.  Pt states he has not taken his plavix or amiodarone or diuretics for a week. The patient was admitted for recurrent VT storm. The patient's BB was restarted and he was started on IV amiodarone and po Mexiletine with plan LHC to reassess any progression in his CAD. EP cardiology Dr. Sandy Diamond saw patient the following day and felt both LHC/RHC needed along with aggressive HF management. Dr. Sandy Diamond referred patient to [address] for consideration of a VT ablation and ?LVAD. Patient underwent LHC/RHC by Dr. Christ Amaro on 11/1/18. LHC Findings:1. Left ventricle:  Left ventricular ejection fraction is estimated at 5%-10% with severe global hypokinesis. 2.  LVEDP:  Was 6 mmHg. 3.  Left main:  Was normal.  4.  Left anterior descending artery was proximally occluded in the mid to distal vessel. It gave rise to several large diagonals. The second one of which was occluded with left to left collateral flow. Next, the circumflex artery had a patent stent proximally with a first obtuse marginal 80% stenotic at the takeoff. 5.  The right coronary artery had diffuse disease throughout with in-stent restenosis approximately 40%, and a patent stent in the mid to distal vessel. This was a dominant vessel. RHC findings: 1. Pulmonary capillary wedge pressure was estimated at 3-4 mmHg. 2. PA pressure was 27/5 mmHg. 3. Right atrial pressure was 4 mmHg. 4. Cardiac output by thermal dilution was 1.97 liters per minute and by Antonina was 2.88 liters per minute. The morning of 11/2/18, the patient has bed at [address] and will be transferred later today. He will be d/c on amiodarone gtt. DISPOSITION: The patient is being discharged to [address] for further care.          Discharge Exam:   Visit Vitals  /81 (BP 1 Location: Right arm, BP Patient Position: At rest)   Pulse (!) 57   Temp 97.9 °F (36.6 °C)   Resp 16   Ht 5' 9\" (1.753 m)   Wt 60.6 kg (133 lb 11.2 oz)   SpO2 97%   BMI 19.74 kg/m²     Patient has been seen by Dr. Elijah Parnell: see his progress note for exam details. Recent Results (from the past 24 hour(s))   METABOLIC PANEL, BASIC    Collection Time: 11/02/18  3:47 AM   Result Value Ref Range    Sodium 137 136 - 145 mmol/L    Potassium 3.9 3.5 - 5.1 mmol/L    Chloride 104 98 - 107 mmol/L    CO2 25 21 - 32 mmol/L    Anion gap 8 7 - 16 mmol/L    Glucose 84 65 - 100 mg/dL    BUN 14 8 - 23 MG/DL    Creatinine 1.18 0.8 - 1.5 MG/DL    GFR est AA >60 >60 ml/min/1.73m2    GFR est non-AA >60 >60 ml/min/1.73m2    Calcium 8.9 8.3 - 10.4 MG/DL   MAGNESIUM    Collection Time: 11/02/18  3:47 AM   Result Value Ref Range    Magnesium 2.2 1.8 - 2.4 mg/dL         Patient Instructions:   Current Discharge Medication List      START taking these medications    Details   amiodarone (CORDARONE) infusion 0.5-1 mg/min by IntraVENous route continuous. Qty: 1 Each, Refills: 0      enalapril (VASOTEC) 5 mg tablet Take 1 Tab by mouth two (2) times a day. Qty: 60 Tab, Refills: 0      furosemide (LASIX) 10 mg/mL injection 2 mL by IntraVENous route two (2) times a day. Qty: 1 Vial, Refills: 0      metoprolol succinate (TOPROL-XL) 25 mg XL tablet Take 1 Tab by mouth two (2) times a day. Qty: 60 Tab, Refills: 0      mexiletine (MEXITIL) 150 mg capsule Take 1 Cap by mouth every eight (8) hours. Qty: 90 Cap, Refills: 0         CONTINUE these medications which have CHANGED    Details   amiodarone (PACERONE) 400 mg tablet Take 1 Tab by mouth two (2) times a day for 1 day. Qty: 2 Tab, Refills: 0 Just for today only          CONTINUE these medications which have NOT CHANGED    Details   clopidogrel (PLAVIX) 75 mg tab Take 1 Tab by mouth daily. Last dose 5-10-11. Qty: 90 Tab, Refills: 3      atorvastatin (LIPITOR) 40 mg tablet Take 1 Tab by mouth daily.   Qty: 90 Tab, Refills: 3 aspirin 81 mg chewable tablet Take 1 Tab by mouth daily. Qty: 30 Tab, Refills: 11      spironolactone (ALDACTONE) 25 mg tablet Take 1 Tab by mouth daily.   Qty: 90 Tab, Refills: 3         STOP taking these medications       furosemide (LASIX) 40 mg tablet Comments:   Reason for Stopping:         potassium chloride (K-DUR, KLOR-CON) 20 mEq tablet Comments:   Reason for Stopping:         sacubitril-valsartan (ENTRESTO) 49 mg/51 mg tablet Comments:   Reason for Stopping:                 Signed:  FIORDALIZA Ortega  11/2/2018  11:40 AM

## 2018-11-02 NOTE — DISCHARGE INSTRUCTIONS
Coronary Angiogram: What to Expect at [address]    A coronary angiogram is a test to examine the large blood vessel of your heart (coronary artery). The doctor inserted a thin, flexible tube (catheter) into a blood vessel in your groin. In some cases, the catheter is placed in a blood vessel in the arm. Your groin or arm may have a bruise and feel sore for a day or two after a coronary angiogram. You can do light activities around the house but nothing strenuous for several days. This care sheet gives you a general idea about how long it will take for you to recover. But each person recovers at a different pace. Follow the steps below to feel better as quickly as possible. How can you care for yourself at home? Activity    · If the doctor gave you a sedative:  ? For 24 hours, don't do anything that requires attention to detail. It takes time for the medicine's effects to completely wear off.  ? For your safety, do not drive or operate any machinery that could be dangerous. Wait until the medicine wears off and you can think clearly and react easily.     · Do not do strenuous exercise and do not lift, pull, or push anything heavy until your doctor says it is okay. This may be for a day or two. You can walk around the house and do light activity, such as cooking.     · If the catheter was placed in your groin, try not to walk up stairs for the first couple of days.     · If the catheter was placed in your arm near your wrist, do not bend your wrist deeply for the first couple of days. Be careful using your hand to get into and out of a chair or bed.     · If your doctor recommends it, get more exercise. Walking is a good choice. Bit by bit, increase the amount you walk every day. Try for at least 30 minutes on most days of the week. Diet    · Drink plenty of fluids to help your body flush out the dye.  If you have kidney, heart, or liver disease and have to limit fluids, talk with your doctor before you increase the amount of fluids you drink.     · Keep eating a heart-healthy diet that has lots of fruits, vegetables, and whole grains. If you have not been eating this way, talk to your doctor. You also may want to talk to a dietitian. This expert can help you to learn about healthy foods and plan meals. Medicines    · Your doctor will tell you if and when you can restart your medicines. He or she will also give you instructions about taking any new medicines.     · If you take blood thinners, such as warfarin (Coumadin), clopidogrel (Plavix), or aspirin, be sure to talk to your doctor. He or she will tell you if and when to start taking those medicines again. Make sure that you understand exactly what your doctor wants you to do.     · Your doctor may prescribe a blood-thinning medicine like aspirin or clopidogrel (Plavix). It is very important that you take these medicines exactly as directed in order to keep the coronary artery open and reduce your risk of a heart attack. Be safe with medicines. Call your doctor if you think you are having a problem with your medicine.    Care of the catheter site    · For 1 or 2 days, keep a bandage over the spot where the catheter was inserted. The bandage probably will fall off in this time.     · Put ice or a cold pack on the area for 10 to 20 minutes at a time to help with soreness or swelling. Put a thin cloth between the ice and your skin.     · You may shower 24 to 48 hours after the procedure, if your doctor okays it. Pat the incision dry.     · Do not soak the catheter site until it is healed. Don't take a bath for 1 week, or until your doctor tells you it is okay.     · If you are bleeding, lie down and press on the area for 15 minutes to try to make it stop. If the bleeding does not stop, call your doctor or seek immediate medical care. Follow-up care is a key part of your treatment and safety.  Be sure to make and go to all appointments, and call your doctor if you are having problems. It's also a good idea to know your test results and keep a list of the medicines you take. When should you call for help? Call 911 anytime you think you may need emergency care. For example, call if:    · You passed out (lost consciousness).     · You have severe trouble breathing.     · You have sudden chest pain and shortness of breath, or you cough up blood.     · You have symptoms of a heart attack. These may include:  ? Chest pain or pressure, or a strange feeling in the chest.  ? Sweating. ? Shortness of breath. ? Nausea or vomiting. ? Pain, pressure, or a strange feeling in the back, neck, jaw, or upper belly, or in one or both shoulders or arms. ? Lightheadedness or sudden weakness. ? A fast or irregular heartbeat. After you call 911, the  may tel you to chew 1 adult-strength or 2 to 4 low-dose aspirin. Wait for an ambulance. Do not try to drive yourself.     · You have been diagnosed with angina, and you have symptoms that do not go away with rest or are not getting better within 5 minutes after you take a dose of nitroglycerin.    Call your doctor now or seek immediate medical care if:    · You are bleeding from the area where the catheter was put in your artery.     · You have a fast-growing, painful lump at the catheter site.     · You have signs of infection, such as:  ? Increased pain, swelling, warmth, or redness. ? Red streaks leading from the catheter site. ? Pus draining from the catheter site. ? A fever.     · Your leg or arm looks blue or feels cold, numb, or tingly.    Watch closely for changes in your health, and be sure to contact your doctor if you have any problems. Where can you learn more? Go to http://raj-savannah.info/. Enter B628 in the search box to learn more about \"Coronary Angiogram: What to Expect at Home. \"  Current as of: December 6, 2017  Content Version: 11.8  © 5670-1931 Healthwise, [facility].  Care instructions adapted under license by [employer] (which disclaims liability or warranty for this information). If you have questions about a medical condition or this instruction, always ask your healthcare professional. [address] any warranty or liability for your use of this information.

## 2018-11-02 NOTE — PROCEDURES
[address] [place]  MR#: [id number]  : 1945  ACCOUNT #: [de-identified]   DATE OF SERVICE: 2018    REFERRING PHYSICIAN:  Radha Curry MD    INDICATIONS:  The patient is a 63-year-old male who presents with a known history of severe systolic heart failure, coronary artery disease and recurrent VT, who presented with ongoing ICD shocks. He was brought to the heart catheterization lab for a left and right heart catheterization. BLOOD LOSS:  Less than 5 mL. SEDATION:  The patient was given 2 mg of Versed and 25 mcg of fentanyl and monitored for conscious sedation  beginning at 1543 and ending at 1632 by nurse Meena Johnson. SPECIMENS:  None. COMPLICATIONS:  None. ASSISTANT:  None. A preprocedure timeout was completed with Mallampati score of 2, ASA score of 3. DESCRIPTION OF PROCEDURE:  After informed consent, the patient was prepped and draped in the usual sterile fashion. The right neck was infiltrated with lidocaine and the right internal jugular was accessed via the modified Seldinger technique under ultrasound guidance. After confirming correct placement an 8-Bruneian venous sheath was placed in the jugular vein and a Nellis-Rojelio catheter was advanced with the balloon out into the pulmonary artery. Next, the right wrist was infiltrated with lidocaine. The right radial artery was accessed by the modified Seldinger technique with a 6-Bruneian sheath. CATHETERS USED:  Included a JL3.5, JR5, angled pigtail catheter, and a balloon tipped Nellis catheter. FINDINGS:  1. Left ventricle:  Left ventricular ejection fraction is estimated at 5%-10% with severe global hypokinesis. 2.  LVEDP:  Was 6 mmHg. 3.  Left main:  Was normal.  4.  Left anterior descending artery was proximally occluded in the mid to distal vessel. It gave rise to several large diagonals.   The second one of which was occluded with left to left collateral flow. Next, the circumflex artery had a patent stent proximally with a first obtuse marginal 80% stenotic at the takeoff. 5.  The right coronary artery had diffuse disease throughout with in-stent restenosis approximately 40%, and a patent stent in the mid to distal vessel. This was a dominant vessel. Right heart findings:    1. Pulmonary capillary wedge pressure was estimated at 3-4 mmHg. 2. PA pressure was 27/5 mmHg. 3. Right atrial pressure was 4 mmHg. 4. Cardiac output by thermal dilution was 1.97 liters per minute and by Antonina was 2.88 liters per minute. CONCLUSION:  This is a 59-year-old male with severe dilated systolic heart failure, coronary artery disease, recurrent ventricular tachycardia, who did not have any new lesions on left heart catheterization and has severe heart failure by right heart catheterization. We will have him continue his current therapy for ventricular tachycardia.       MD ELEANOR Palafox / Tadeo Hernandez  D: 11/01/2018 16:56     T: 11/02/2018 03:34  JOB #: [id number]

## 2018-11-02 NOTE — PROGRESS NOTES
Radial compression band removed at 2015 after slowly reducing air from 12 cc to zero as per hospital protocol. No bleeding or hematoma noted. 2 x 2 gauze with tegaderm placed over puncture site. The affected extremity is warm and dry to the touch. Frequent vital signs documented per flowsheet. Patient instructed to call if any bleeding noted on gauze. Patient verbalized understanding the nursing instructions.

## 2018-11-10 ENCOUNTER — APPOINTMENT (OUTPATIENT)
Dept: GENERAL RADIOLOGY | Age: 73
End: 2018-11-10
Payer: MEDICARE

## 2018-11-10 ENCOUNTER — HOSPITAL ENCOUNTER (EMERGENCY)
Age: 73
Discharge: HOME | End: 2018-11-10
Payer: MEDICARE

## 2018-11-10 VITALS
BODY MASS INDEX: 19.99 KG/M2 | TEMPERATURE: 98.5 F | OXYGEN SATURATION: 98 % | RESPIRATION RATE: 30 BRPM | HEIGHT: 69 IN | HEART RATE: 52 BPM | DIASTOLIC BLOOD PRESSURE: 78 MMHG | SYSTOLIC BLOOD PRESSURE: 125 MMHG | WEIGHT: 135 LBS

## 2018-11-10 DIAGNOSIS — R25.3 MUSCLE TWITCHING: Primary | ICD-10-CM

## 2018-11-10 LAB
ALBUMIN SERPL-MCNC: 3.3 G/DL (ref 3.2–4.6)
ALBUMIN/GLOB SERPL: 0.9 {RATIO} (ref 1.2–3.5)
ALP SERPL-CCNC: 147 U/L (ref 50–136)
ALT SERPL-CCNC: 26 U/L (ref 12–65)
ANION GAP SERPL CALC-SCNC: 9 MMOL/L (ref 7–16)
APTT PPP: >200 SEC (ref 23.2–35.3)
AST SERPL-CCNC: 16 U/L (ref 15–37)
ATRIAL RATE: 74 BPM
BASOPHILS # BLD: 0 K/UL (ref 0–0.2)
BASOPHILS NFR BLD: 0 % (ref 0–2)
BILIRUB SERPL-MCNC: 0.7 MG/DL (ref 0.2–1.1)
BNP SERPL-MCNC: 474 PG/ML
BUN SERPL-MCNC: 17 MG/DL (ref 8–23)
CALCIUM SERPL-MCNC: 8.9 MG/DL (ref 8.3–10.4)
CALCULATED P AXIS, ECG09: 80 DEGREES
CALCULATED R AXIS, ECG10: 61 DEGREES
CALCULATED T AXIS, ECG11: 84 DEGREES
CHLORIDE SERPL-SCNC: 106 MMOL/L (ref 98–107)
CO2 SERPL-SCNC: 23 MMOL/L (ref 21–32)
CREAT SERPL-MCNC: 1.43 MG/DL (ref 0.8–1.5)
DIAGNOSIS, 93000: NORMAL
DIFFERENTIAL METHOD BLD: ABNORMAL
EOSINOPHIL # BLD: 0.1 K/UL (ref 0–0.8)
EOSINOPHIL NFR BLD: 2 % (ref 0.5–7.8)
ERYTHROCYTE [DISTWIDTH] IN BLOOD BY AUTOMATED COUNT: 13 %
GLOBULIN SER CALC-MCNC: 3.7 G/DL (ref 2.3–3.5)
GLUCOSE SERPL-MCNC: 89 MG/DL (ref 65–100)
HCT VFR BLD AUTO: 46.6 % (ref 41.1–50.3)
HGB BLD-MCNC: 15.6 G/DL (ref 13.6–17.2)
IMM GRANULOCYTES # BLD: 0 K/UL (ref 0–0.5)
IMM GRANULOCYTES NFR BLD AUTO: 1 % (ref 0–5)
INR PPP: >16.2
LYMPHOCYTES # BLD: 0.8 K/UL (ref 0.5–4.6)
LYMPHOCYTES NFR BLD: 24 % (ref 13–44)
MAGNESIUM SERPL-MCNC: 2.2 MG/DL (ref 1.8–2.4)
MCH RBC QN AUTO: 30.7 PG (ref 26.1–32.9)
MCHC RBC AUTO-ENTMCNC: 33.5 G/DL (ref 31.4–35)
MCV RBC AUTO: 91.7 FL (ref 79.6–97.8)
MONOCYTES # BLD: 0.2 K/UL (ref 0.1–1.3)
MONOCYTES NFR BLD: 7 % (ref 4–12)
NEUTS SEG # BLD: 2.3 K/UL (ref 1.7–8.2)
NEUTS SEG NFR BLD: 67 % (ref 43–78)
NRBC # BLD: 0.02 K/UL (ref 0–0.2)
P-R INTERVAL, ECG05: 192 MS
PLATELET # BLD AUTO: 58 K/UL (ref 150–450)
PMV BLD AUTO: ABNORMAL FL (ref 9.4–12.3)
POTASSIUM SERPL-SCNC: 4.3 MMOL/L (ref 3.5–5.1)
PROT SERPL-MCNC: 7 G/DL (ref 6.3–8.2)
PROTHROMBIN TIME: >120 SEC (ref 11.5–14.5)
Q-T INTERVAL, ECG07: 398 MS
QRS DURATION, ECG06: 154 MS
QTC CALCULATION (BEZET), ECG08: 441 MS
RBC # BLD AUTO: 5.08 M/UL (ref 4.23–5.6)
SODIUM SERPL-SCNC: 138 MMOL/L (ref 136–145)
TROPONIN I BLD-MCNC: 0.04 NG/ML (ref 0.02–0.05)
VENTRICULAR RATE, ECG03: 74 BPM
WBC # BLD AUTO: 3.4 K/UL (ref 4.3–11.1)

## 2018-11-10 PROCEDURE — 80053 COMPREHEN METABOLIC PANEL: CPT

## 2018-11-10 PROCEDURE — 83880 ASSAY OF NATRIURETIC PEPTIDE: CPT

## 2018-11-10 PROCEDURE — 84484 ASSAY OF TROPONIN QUANT: CPT

## 2018-11-10 PROCEDURE — 93005 ELECTROCARDIOGRAM TRACING: CPT

## 2018-11-10 PROCEDURE — 71045 X-RAY EXAM CHEST 1 VIEW: CPT

## 2018-11-10 PROCEDURE — 81003 URINALYSIS AUTO W/O SCOPE: CPT

## 2018-11-10 PROCEDURE — 85610 PROTHROMBIN TIME: CPT

## 2018-11-10 PROCEDURE — 85730 THROMBOPLASTIN TIME PARTIAL: CPT

## 2018-11-10 PROCEDURE — 85025 COMPLETE CBC W/AUTO DIFF WBC: CPT

## 2018-11-10 PROCEDURE — 83735 ASSAY OF MAGNESIUM: CPT

## 2018-11-10 PROCEDURE — 99285 EMERGENCY DEPT VISIT HI MDM: CPT

## 2018-11-10 RX ORDER — CYCLOBENZAPRINE HCL 5 MG
5 TABLET ORAL
Qty: 10 TAB | Refills: 0 | Status: SHIPPED | OUTPATIENT
Start: 2018-11-10 | End: 2019-01-29

## 2018-11-10 NOTE — ED NOTES
Cardiology consult to Dr. Alfaro Grade patient follow up in outpatient clinic The cardiology 400 number was contacted. Patient's demographics were relayed. Request for urgent follow-up was made.

## 2018-11-10 NOTE — ED NOTES
Patient report received from [facility]. Care assumed at this time. Patient awaiting lab results in order for discharge.

## 2018-11-10 NOTE — ED NOTES
I have reviewed discharge instructions with the patient. The patient verbalized understanding. Patient left ED via Discharge Method: wheelchair to waiting room self. Opportunity for questions and clarification provided. Patient given 1 scripts to  at [facility]. To continue your aftercare when you leave the hospital, you may receive an automated call from our care team to check in on how you are doing. This is a free service and part of our promise to provide the best care and service to meet your aftercare needs.  If you have questions, or wish to unsubscribe from this service please call [phone]. Thank you for Choosing our [facility] Emergency Department.

## 2018-11-10 NOTE — ED PROVIDER NOTES
53-year-old male complaining of arms and legs twitching. EMS brought the patient and they were originally reported chest pain  however the patient states she's never had chest pain tonight no shortness of breath. He was trying to fall asleep in his arm would twitch in his leg with twitching of twitching in his neck twitching seemed to be all over at different times. Patient still had this before but it kept him awake. Patient has an extensive cardiac history and has an AICD last EF 5-10% Left and right heart catheter done 7 days ago CONCLUSION:  This is a 53-year-old male with severe dilated systolic heart failure, coronary artery disease, recurrent ventricular tachycardia, who did not have any new lesions on left heart catheterization and has severe heart failure by right heart catheterization. We will have him continue his current therapy for ventricular tachycardia. 
  
 
 
 
 
The history is provided by the patient. Chest Pain (Angina) This is a recurrent problem. The problem has not changed since onset. The problem occurs constantly. The pain is associated with rest. The pain is at a severity of 0/10. The patient is experiencing no pain. Pertinent negatives include no back pain, no diaphoresis and no shortness of breath. Past Medical History:  
Diagnosis Date  AICD (automatic cardioverter/defibrillator) present 5/18/2016  Arrhythmia   
 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  
Procedure Laterality Date  CARDIAC SURG PROCEDURE UNLIST    
 6 stents; last one put in 2 years ago october 2009  HX HEART CATHETERIZATION    
 2009  HX OTHER SURGICAL    
 scrotum  HX PACEMAKER Family History:  
Problem Relation Age of Onset  Heart Disease Father  Cancer Sister Social History Socioeconomic History  Marital status:  Spouse name: Not on file  Number of children: Not on file  Years of education: Not on file  Highest education level: Not on file Social Needs  Financial resource strain: Somewhat hard  Food insecurity - worry: Never true  Food insecurity - inability: Never true  Transportation needs - medical: No  
 Transportation needs - non-medical: No  
Occupational History  Not on file Tobacco Use  Smoking status: Former Smoker Packs/day: 0.50 Years: 20.00 Pack years: 10.00 Last attempt to quit: 10/1/2018 Years since quittin.1  Smokeless tobacco: Never Used  Tobacco comment: quit smoking approx. 30 days ago. Substance and Sexual Activity  Alcohol use: Yes Alcohol/week: 3.6 oz Types: 6 Cans of beer per week Comment: quit alcohol intake approx. 1 month ago.  Drug use: No  
 Sexual activity: Not Currently Partners: Female Other Topics Concern   Service No  
 Blood Transfusions No  
 Caffeine Concern No  
 Occupational Exposure No  
 Hobby Hazards No  
 Sleep Concern No  
 Stress Concern No  
 Weight Concern No  
 Special Diet No  
 Back Care No  
 Exercise No  
 Bike Helmet No  
 Seat Belt No  
 Self-Exams No  
Social History Narrative  Not on file ALLERGIES: Patient has no known allergies. Review of Systems Constitutional: Negative. Negative for activity change and diaphoresis. HENT: Negative. Eyes: Negative. Respiratory: Negative. Negative for shortness of breath. Cardiovascular: Positive for chest pain. Gastrointestinal: Negative. Genitourinary: Negative. Musculoskeletal: Negative. Negative for back pain. Skin: Negative. Neurological: Negative. Psychiatric/Behavioral: Negative. All other systems reviewed and are negative. Vitals:  
 11/10/18 5571 BP: (!) 194/122 Pulse: 75 Resp: 16 Temp: 98.5 °F (36.9 °C) SpO2: 98% Weight: 61.2 kg (135 lb) Height: 5' 9\" (1.753 m) Physical Exam  
Constitutional: He is oriented to person, place, and time. He appears well-developed and well-nourished. No distress. HENT:  
Head: Normocephalic and atraumatic. Right Ear: External ear normal.  
Left Ear: External ear normal.  
Nose: Nose normal.  
Eyes: Conjunctivae and EOM are normal. Pupils are equal, round, and reactive to light. Right eye exhibits no discharge. Left eye exhibits no discharge. No scleral icterus. Neck: Normal range of motion. Cardiovascular: Regular rhythm. Pulmonary/Chest: Effort normal and breath sounds normal. No stridor. No respiratory distress. He has no wheezes. He has no rales. Abdominal: Soft. Bowel sounds are normal. He exhibits no distension. There is no tenderness. Musculoskeletal: Normal range of motion. Neurological: He is alert and oriented to person, place, and time. He exhibits normal muscle tone. Coordination normal.  
Skin: Skin is warm and dry. No rash noted. Psychiatric: He has a normal mood and affect. His behavior is normal.  
  
 
MDM Procedures

## 2018-11-10 NOTE — ED TRIAGE NOTES
Ems gave 324mg asa and gave 1inch ntg paste to right chest wall pt states that he is not having chest pain that every time he tried to go to sleep his arm or his leg would start twitching. staes he is not having that twitching now only comes when he tries to go to sleep

## 2018-11-10 NOTE — DISCHARGE INSTRUCTIONS
Musculoskeletal Pain: Care Instructions  Your Care Instructions  Different problems with the bones, muscles, nerves, ligaments, and tendons in the body can cause pain. One or more areas of your body may ache or burn. Or they may feel tired, stiff, or sore. The medical term for this type of pain is musculoskeletal pain. It can have many different causes. Sometimes the pain is caused by an injury such as a strain or sprain. Or you might have pain from using one part of your body in the same way over and over again. This is called overuse. In some cases, the cause of the pain is another health problem such as arthritis or fibromyalgia. The doctor will examine you and ask you questions about your health to help find the cause of your pain. Blood tests or imaging tests like an X-ray may also be helpful. But sometimes doctors can't find a cause of the pain. Treatment depends on your symptoms and the cause of the pain, if known. The doctor has checked you carefully, but problems can develop later. If you notice any problems or new symptoms, get medical treatment right away. Follow-up care is a key part of your treatment and safety. Be sure to make and go to all appointments, and call your doctor if you are having problems. It's also a good idea to know your test results and keep a list of the medicines you take. How can you care for yourself at home? · Rest until you feel better. · Do not do anything that makes the pain worse. Return to exercise gradually if you feel better and your doctor says it's okay. · Be safe with medicines. Read and follow all instructions on the label. ¨ If the doctor gave you a prescription medicine for pain, take it as prescribed. ¨ If you are not taking a prescription pain medicine, ask your doctor if you can take an over-the-counter medicine. · Put ice or a cold pack on the area for 10 to 20 minutes at a time to ease pain. Put a thin cloth between the ice and your skin.   When should you call for help? Call your doctor now or seek immediate medical care if:  · You have new pain, or your pain gets worse. · You have new symptoms such as a fever, a rash, or chills. Watch closely for changes in your health, and be sure to contact your doctor if:  · You do not get better as expected. Where can you learn more? Go to [employer]  Enter Q624 in the search box to learn more about \"Musculoskeletal Pain: Care Instructions. \"   © 4790-6839 [employer], [employer]. Care instructions adapted under license by [employer] (which disclaims liability or warranty for this information). This care instruction is for use with your licensed healthcare professional. If you have questions about a medical condition or this instruction, always ask your healthcare professional. [address] any warranty or liability for your use of this information.   Content Version: 07.8.104036; Current as of: November 20, 2015

## 2019-05-02 PROBLEM — I44.7 LBBB (LEFT BUNDLE BRANCH BLOCK): Status: ACTIVE | Noted: 2019-05-02

## 2019-05-20 NOTE — PROGRESS NOTES
Pt called to see what time his procedure was scheduled for Thursday. Pt is currently scheduled at 1pm with arrival time at 11am. Pt had very poor phone reception & could not hear well. States will call me back once he gets to a better reception area. Informed him that time was subject to chance but that I would notify him of any changes. Voiced good understanding. Reminded of need for blood work, states that he is coming to have blood work done tomorrow.

## 2019-05-21 ENCOUNTER — HOSPITAL ENCOUNTER (OUTPATIENT)
Dept: LAB | Age: 74
Discharge: HOME | End: 2019-05-21
Payer: MEDICARE

## 2019-05-21 DIAGNOSIS — Z95.810 AICD (AUTOMATIC CARDIOVERTER/DEFIBRILLATOR) PRESENT: ICD-10-CM

## 2019-05-21 DIAGNOSIS — I44.7 LBBB (LEFT BUNDLE BRANCH BLOCK): ICD-10-CM

## 2019-05-21 DIAGNOSIS — I50.22 CHRONIC SYSTOLIC HEART FAILURE (HCC): Chronic | ICD-10-CM

## 2019-05-21 LAB
ANION GAP SERPL CALC-SCNC: 9 MMOL/L (ref 7–16)
BASOPHILS # BLD: 0.1 K/UL (ref 0–0.2)
BASOPHILS NFR BLD: 1 % (ref 0–2)
BUN SERPL-MCNC: 20 MG/DL (ref 8–23)
CALCIUM SERPL-MCNC: 8.7 MG/DL (ref 8.3–10.4)
CHLORIDE SERPL-SCNC: 104 MMOL/L (ref 98–107)
CO2 SERPL-SCNC: 27 MMOL/L (ref 21–32)
CREAT SERPL-MCNC: 1.92 MG/DL (ref 0.8–1.5)
DIFFERENTIAL METHOD BLD: ABNORMAL
EOSINOPHIL # BLD: 0.1 K/UL (ref 0–0.8)
EOSINOPHIL NFR BLD: 2 % (ref 0.5–7.8)
ERYTHROCYTE [DISTWIDTH] IN BLOOD BY AUTOMATED COUNT: 14 % (ref 11.9–14.6)
EST. AVERAGE GLUCOSE BLD GHB EST-MCNC: 120 MG/DL
GLUCOSE SERPL-MCNC: 87 MG/DL (ref 65–100)
HBA1C MFR BLD: 5.8 % (ref 4.8–6)
HCT VFR BLD AUTO: 36.7 % (ref 41.1–50.3)
HGB BLD-MCNC: 11.9 G/DL (ref 13.6–17.2)
IMM GRANULOCYTES # BLD AUTO: 0.1 K/UL (ref 0–0.5)
IMM GRANULOCYTES NFR BLD AUTO: 1 % (ref 0–5)
INR PPP: 1.1
LYMPHOCYTES # BLD: 1.3 K/UL (ref 0.5–4.6)
LYMPHOCYTES NFR BLD: 17 % (ref 13–44)
MAGNESIUM SERPL-MCNC: 2.4 MG/DL (ref 1.8–2.4)
MCH RBC QN AUTO: 31.6 PG (ref 26.1–32.9)
MCHC RBC AUTO-ENTMCNC: 32.4 G/DL (ref 31.4–35)
MCV RBC AUTO: 97.6 FL (ref 79.6–97.8)
MONOCYTES # BLD: 0.8 K/UL (ref 0.1–1.3)
MONOCYTES NFR BLD: 10 % (ref 4–12)
NEUTS SEG # BLD: 5.1 K/UL (ref 1.7–8.2)
NEUTS SEG NFR BLD: 69 % (ref 43–78)
NRBC # BLD: 0 K/UL (ref 0–0.2)
PLATELET # BLD AUTO: 251 K/UL (ref 150–450)
PMV BLD AUTO: 9.7 FL (ref 9.4–12.3)
POTASSIUM SERPL-SCNC: 4.3 MMOL/L (ref 3.5–5.1)
PROTHROMBIN TIME: 13.9 SEC (ref 11.7–14.5)
RBC # BLD AUTO: 3.76 M/UL (ref 4.23–5.6)
SODIUM SERPL-SCNC: 140 MMOL/L (ref 136–145)
WBC # BLD AUTO: 7.3 K/UL (ref 4.3–11.1)

## 2019-05-21 PROCEDURE — 83036 HEMOGLOBIN GLYCOSYLATED A1C: CPT

## 2019-05-21 PROCEDURE — 80048 BASIC METABOLIC PNL TOTAL CA: CPT

## 2019-05-21 PROCEDURE — 36415 COLL VENOUS BLD VENIPUNCTURE: CPT

## 2019-05-21 PROCEDURE — 83735 ASSAY OF MAGNESIUM: CPT

## 2019-05-21 PROCEDURE — 85025 COMPLETE CBC W/AUTO DIFF WBC: CPT

## 2019-05-21 PROCEDURE — 85610 PROTHROMBIN TIME: CPT

## 2019-05-22 ENCOUNTER — ANESTHESIA EVENT (OUTPATIENT)
Dept: SURGERY | Age: 74
End: 2019-05-22

## 2019-05-22 RX ORDER — SODIUM CHLORIDE, SODIUM LACTATE, POTASSIUM CHLORIDE, CALCIUM CHLORIDE 600; 310; 30; 20 MG/100ML; MG/100ML; MG/100ML; MG/100ML
100 INJECTION, SOLUTION INTRAVENOUS CONTINUOUS
Status: CANCELLED | OUTPATIENT
Start: 2019-05-22 | End: 2019-05-23

## 2019-05-22 RX ORDER — ALBUTEROL SULFATE 0.83 MG/ML
2.5 SOLUTION RESPIRATORY (INHALATION) AS NEEDED
Status: CANCELLED | OUTPATIENT
Start: 2019-05-22

## 2019-05-22 RX ORDER — LIDOCAINE HYDROCHLORIDE 10 MG/ML
0.1 INJECTION INFILTRATION; PERINEURAL AS NEEDED
Status: CANCELLED | OUTPATIENT
Start: 2019-05-22

## 2019-05-22 RX ORDER — SODIUM CHLORIDE, SODIUM LACTATE, POTASSIUM CHLORIDE, CALCIUM CHLORIDE 600; 310; 30; 20 MG/100ML; MG/100ML; MG/100ML; MG/100ML
100 INJECTION, SOLUTION INTRAVENOUS CONTINUOUS
Status: CANCELLED | OUTPATIENT
Start: 2019-05-22

## 2019-05-22 RX ORDER — MIDAZOLAM HYDROCHLORIDE 1 MG/ML
2 INJECTION, SOLUTION INTRAMUSCULAR; INTRAVENOUS
Status: CANCELLED | OUTPATIENT
Start: 2019-05-22 | End: 2019-05-23

## 2019-05-22 RX ORDER — OXYCODONE HYDROCHLORIDE 5 MG/1
5 TABLET ORAL
Status: CANCELLED | OUTPATIENT
Start: 2019-05-22 | End: 2019-05-23

## 2019-05-22 RX ORDER — MIDAZOLAM HYDROCHLORIDE 1 MG/ML
2 INJECTION, SOLUTION INTRAMUSCULAR; INTRAVENOUS ONCE
Status: CANCELLED | OUTPATIENT
Start: 2019-05-22 | End: 2019-05-22

## 2019-05-22 RX ORDER — FENTANYL CITRATE 50 UG/ML
100 INJECTION, SOLUTION INTRAMUSCULAR; INTRAVENOUS ONCE
Status: CANCELLED | OUTPATIENT
Start: 2019-05-22 | End: 2019-05-22

## 2019-05-22 RX ORDER — NALOXONE HYDROCHLORIDE 0.4 MG/ML
0.1 INJECTION, SOLUTION INTRAMUSCULAR; INTRAVENOUS; SUBCUTANEOUS AS NEEDED
Status: CANCELLED | OUTPATIENT
Start: 2019-05-22

## 2019-05-22 RX ORDER — OXYCODONE HYDROCHLORIDE 5 MG/1
10 TABLET ORAL
Status: CANCELLED | OUTPATIENT
Start: 2019-05-22 | End: 2019-05-23

## 2019-05-22 RX ORDER — HYDROMORPHONE HYDROCHLORIDE 2 MG/ML
0.5 INJECTION, SOLUTION INTRAMUSCULAR; INTRAVENOUS; SUBCUTANEOUS
Status: CANCELLED | OUTPATIENT
Start: 2019-05-22

## 2019-05-22 RX ORDER — DIPHENHYDRAMINE HYDROCHLORIDE 50 MG/ML
12.5 INJECTION, SOLUTION INTRAMUSCULAR; INTRAVENOUS
Status: CANCELLED | OUTPATIENT
Start: 2019-05-22 | End: 2019-05-23

## 2019-05-22 NOTE — PROGRESS NOTES
Called to pre-assess for BiV ICD upgrade with Dr Danica Monique , Scheduled 5/23/19.  No answer , ni voicemail, unable to leave message

## 2019-05-22 NOTE — PROGRESS NOTES
Called to pre-assess for BIV ICD upgrade with Dr Apollo Hernandez , Scheduled 5/22/19.  No answer & No voicemail set up - unable to leave message

## 2019-05-23 ENCOUNTER — APPOINTMENT (OUTPATIENT)
Dept: CARDIAC CATH/INVASIVE PROCEDURES | Age: 74
End: 2019-05-23

## 2019-05-23 ENCOUNTER — HOSPITAL ENCOUNTER (OUTPATIENT)
Age: 74
Setting detail: OUTPATIENT SURGERY
Discharge: HOME | End: 2019-05-23
Attending: INTERNAL MEDICINE | Admitting: INTERNAL MEDICINE

## 2019-05-23 ENCOUNTER — ANESTHESIA (OUTPATIENT)
Dept: SURGERY | Age: 74
End: 2019-05-23

## 2019-05-23 NOTE — ANESTHESIA PREPROCEDURE EVALUATION
Relevant Problems   No relevant active problems       Anesthetic History               Review of Systems / Medical History  Patient summary reviewed, nursing notes reviewed and pertinent labs reviewed    Pulmonary                   Neuro/Psych              Cardiovascular    Hypertension: well controlled        Dysrhythmias : SVT  CAD    Exercise tolerance: >4 METS     GI/Hepatic/Renal     GERD: well controlled           Endo/Other             Other Findings            Physical Exam    Airway  Mallampati: II  TM Distance: 4 - 6 cm  Neck ROM: normal range of motion   Mouth opening: Normal     Cardiovascular  Regular rate and rhythm,  S1 and S2 normal,  no murmur, click, rub, or gallop             Dental  No notable dental hx       Pulmonary  Breath sounds clear to auscultation               Abdominal         Other Findings            Anesthetic Plan    ASA: 3  Anesthesia type: general          Induction: Intravenous  Anesthetic plan and risks discussed with: Patient

## 2019-10-15 PROBLEM — G45.9 TIA (TRANSIENT ISCHEMIC ATTACK): Status: ACTIVE | Noted: 2019-10-15

## 2019-10-15 PROBLEM — I51.3 LEFT VENTRICULAR APICAL THROMBUS: Status: ACTIVE | Noted: 2019-10-15

## 2021-01-22 ENCOUNTER — HOSPITAL ENCOUNTER (OUTPATIENT)
Dept: LAB | Age: 76
Discharge: HOME | End: 2021-01-22

## 2021-01-22 DIAGNOSIS — Z45.02 ICD (IMPLANTABLE CARDIOVERTER-DEFIBRILLATOR) DISCHARGE: Chronic | ICD-10-CM

## 2021-01-22 DIAGNOSIS — I47.20 VENTRICULAR TACHYCARDIA: Chronic | ICD-10-CM

## 2021-01-22 DIAGNOSIS — I47.20 VT (VENTRICULAR TACHYCARDIA): ICD-10-CM

## 2021-01-22 DIAGNOSIS — I50.22 CHRONIC SYSTOLIC HEART FAILURE (HCC): Chronic | ICD-10-CM

## 2021-01-22 LAB
ALBUMIN SERPL-MCNC: 3.8 G/DL (ref 3.2–4.6)
ALBUMIN/GLOB SERPL: 1 {RATIO} (ref 1.2–3.5)
ALP SERPL-CCNC: 141 U/L (ref 50–136)
ALT SERPL-CCNC: 23 U/L (ref 12–65)
ANION GAP SERPL CALC-SCNC: 4 MMOL/L (ref 7–16)
AST SERPL-CCNC: 17 U/L (ref 15–37)
BASOPHILS # BLD: 0 K/UL (ref 0–0.2)
BASOPHILS NFR BLD: 0 % (ref 0–2)
BILIRUB SERPL-MCNC: 0.6 MG/DL (ref 0.2–1.1)
BUN SERPL-MCNC: 11 MG/DL (ref 8–23)
CALCIUM SERPL-MCNC: 8.6 MG/DL (ref 8.3–10.4)
CHLORIDE SERPL-SCNC: 108 MMOL/L (ref 98–107)
CO2 SERPL-SCNC: 27 MMOL/L (ref 21–32)
CREAT SERPL-MCNC: 1.37 MG/DL (ref 0.8–1.5)
DIFFERENTIAL METHOD BLD: ABNORMAL
EOSINOPHIL # BLD: 0 K/UL (ref 0–0.8)
EOSINOPHIL NFR BLD: 1 % (ref 0.5–7.8)
ERYTHROCYTE [DISTWIDTH] IN BLOOD BY AUTOMATED COUNT: 14.6 % (ref 11.9–14.6)
GLOBULIN SER CALC-MCNC: 3.8 G/DL (ref 2.3–3.5)
GLUCOSE SERPL-MCNC: 86 MG/DL (ref 65–100)
HCT VFR BLD AUTO: 51.9 % (ref 41.1–50.3)
HGB BLD-MCNC: 16.3 G/DL (ref 13.6–17.2)
IMM GRANULOCYTES # BLD AUTO: 0 K/UL (ref 0–0.5)
IMM GRANULOCYTES NFR BLD AUTO: 0 % (ref 0–5)
LYMPHOCYTES # BLD: 1.3 K/UL (ref 0.5–4.6)
LYMPHOCYTES NFR BLD: 25 % (ref 13–44)
MAGNESIUM SERPL-MCNC: 2 MG/DL (ref 1.8–2.4)
MCH RBC QN AUTO: 29.5 PG (ref 26.1–32.9)
MCHC RBC AUTO-ENTMCNC: 31.4 G/DL (ref 31.4–35)
MCV RBC AUTO: 93.9 FL (ref 79.6–97.8)
MONOCYTES # BLD: 0.5 K/UL (ref 0.1–1.3)
MONOCYTES NFR BLD: 9 % (ref 4–12)
NEUTS SEG # BLD: 3.5 K/UL (ref 1.7–8.2)
NEUTS SEG NFR BLD: 65 % (ref 43–78)
NRBC # BLD: 0 K/UL (ref 0–0.2)
PLATELET # BLD AUTO: 204 K/UL (ref 150–450)
PMV BLD AUTO: 10.4 FL (ref 9.4–12.3)
POTASSIUM SERPL-SCNC: 4.1 MMOL/L (ref 3.5–5.1)
PROT SERPL-MCNC: 7.6 G/DL (ref 6.3–8.2)
RBC # BLD AUTO: 5.53 M/UL (ref 4.23–5.6)
SODIUM SERPL-SCNC: 139 MMOL/L (ref 136–145)
T4 FREE SERPL-MCNC: 1.5 NG/DL (ref 0.9–1.8)
TSH SERPL DL<=0.005 MIU/L-ACNC: 0.13 UIU/ML (ref 0.36–3.74)
WBC # BLD AUTO: 5.3 K/UL (ref 4.3–11.1)

## 2021-01-22 PROCEDURE — 36415 COLL VENOUS BLD VENIPUNCTURE: CPT

## 2021-01-22 PROCEDURE — 80053 COMPREHEN METABOLIC PANEL: CPT

## 2021-01-22 PROCEDURE — 84443 ASSAY THYROID STIM HORMONE: CPT

## 2021-01-22 PROCEDURE — 85025 COMPLETE CBC W/AUTO DIFF WBC: CPT

## 2021-01-22 PROCEDURE — 84439 ASSAY OF FREE THYROXINE: CPT

## 2021-01-22 PROCEDURE — 83735 ASSAY OF MAGNESIUM: CPT

## 2021-01-27 ENCOUNTER — ANESTHESIA EVENT (OUTPATIENT)
Dept: SURGERY | Age: 76
End: 2021-01-27
Payer: MEDICARE

## 2021-01-29 ENCOUNTER — APPOINTMENT (OUTPATIENT)
Dept: CARDIAC CATH/INVASIVE PROCEDURES | Age: 76
End: 2021-01-29
Payer: MEDICARE

## 2021-01-29 ENCOUNTER — ANESTHESIA (OUTPATIENT)
Dept: SURGERY | Age: 76
End: 2021-01-29
Payer: MEDICARE

## 2021-02-10 VITALS — WEIGHT: 152 LBS | BODY MASS INDEX: 22.51 KG/M2 | HEIGHT: 69 IN

## 2021-02-10 NOTE — PROGRESS NOTES
Patient pre-assessment complete for [facility] scheduled for BiV, arrival time 1130. Patient verified using . Patient instructed to bring all home medications in labeled bottles on the day of procedure. NPO status reinforced. Patient instructed to HOLD warfarin. Patient verbalizes understanding of all instructions & denies any questions at this time.

## 2021-02-11 ENCOUNTER — HOSPITAL ENCOUNTER (OUTPATIENT)
Dept: CARDIAC CATH/INVASIVE PROCEDURES | Age: 76
Discharge: HOME | End: 2021-02-11
Payer: MEDICARE

## 2021-02-11 ENCOUNTER — HOSPITAL ENCOUNTER (OUTPATIENT)
Age: 76
Setting detail: OBSERVATION
Discharge: HOME | End: 2021-02-12
Attending: INTERNAL MEDICINE | Admitting: INTERNAL MEDICINE
Payer: MEDICARE

## 2021-02-11 ENCOUNTER — APPOINTMENT (OUTPATIENT)
Dept: GENERAL RADIOLOGY | Age: 76
End: 2021-02-11
Attending: INTERNAL MEDICINE
Payer: MEDICARE

## 2021-02-11 DIAGNOSIS — I25.5 ISCHEMIC CARDIOMYOPATHY: Chronic | ICD-10-CM

## 2021-02-11 DIAGNOSIS — Z95.810 AICD (AUTOMATIC CARDIOVERTER/DEFIBRILLATOR) PRESENT: ICD-10-CM

## 2021-02-11 DIAGNOSIS — I44.7 LBBB (LEFT BUNDLE BRANCH BLOCK): ICD-10-CM

## 2021-02-11 DIAGNOSIS — Z95.0 PACEMAKER: Primary | ICD-10-CM

## 2021-02-11 LAB
ANION GAP SERPL CALC-SCNC: 5 MMOL/L (ref 7–16)
ATRIAL RATE: 88 BPM
BUN SERPL-MCNC: 9 MG/DL (ref 8–23)
CALCIUM SERPL-MCNC: 9.1 MG/DL (ref 8.3–10.4)
CALCULATED P AXIS, ECG09: 83 DEGREES
CALCULATED R AXIS, ECG10: 41 DEGREES
CALCULATED T AXIS, ECG11: -145 DEGREES
CHLORIDE SERPL-SCNC: 108 MMOL/L (ref 98–107)
CO2 SERPL-SCNC: 27 MMOL/L (ref 21–32)
CREAT SERPL-MCNC: 1.17 MG/DL (ref 0.8–1.5)
DIAGNOSIS, 93000: NORMAL
ERYTHROCYTE [DISTWIDTH] IN BLOOD BY AUTOMATED COUNT: 14.3 % (ref 11.9–14.6)
GLUCOSE SERPL-MCNC: 85 MG/DL (ref 65–100)
HCT VFR BLD AUTO: 44.9 % (ref 41.1–50.3)
HGB BLD-MCNC: 14.3 G/DL (ref 13.6–17.2)
INR PPP: 1.5
MAGNESIUM SERPL-MCNC: 2.1 MG/DL (ref 1.8–2.4)
MCH RBC QN AUTO: 29.3 PG (ref 26.1–32.9)
MCHC RBC AUTO-ENTMCNC: 31.8 G/DL (ref 31.4–35)
MCV RBC AUTO: 92 FL (ref 79.6–97.8)
NRBC # BLD: 0 K/UL (ref 0–0.2)
P-R INTERVAL, ECG05: 168 MS
PLATELET # BLD AUTO: 179 K/UL (ref 150–450)
PMV BLD AUTO: 10.4 FL (ref 9.4–12.3)
POTASSIUM SERPL-SCNC: 4 MMOL/L (ref 3.5–5.1)
PROTHROMBIN TIME: 18.1 SEC (ref 12.5–14.7)
Q-T INTERVAL, ECG07: 410 MS
QRS DURATION, ECG06: 130 MS
QTC CALCULATION (BEZET), ECG08: 496 MS
RBC # BLD AUTO: 4.88 M/UL (ref 4.23–5.6)
SODIUM SERPL-SCNC: 140 MMOL/L (ref 138–145)
VENTRICULAR RATE, ECG03: 88 BPM
WBC # BLD AUTO: 7.2 K/UL (ref 4.3–11.1)

## 2021-02-11 PROCEDURE — C1892 INTRO/SHEATH,FIXED,PEEL-AWAY: HCPCS

## 2021-02-11 PROCEDURE — 77030008462 HC STPLR SKN PROX J&J -A

## 2021-02-11 PROCEDURE — 33264 RMVL & RPLCMT DFB GEN MLT LD: CPT | Performed by: INTERNAL MEDICINE

## 2021-02-11 PROCEDURE — C1751 CATH, INF, PER/CENT/MIDLINE: HCPCS

## 2021-02-11 PROCEDURE — 77030031139 HC SUT VCRL2 J&J -A

## 2021-02-11 PROCEDURE — 33241 REMOVE PULSE GENERATOR: CPT

## 2021-02-11 PROCEDURE — 99218 HC RM OBSERVATION: CPT

## 2021-02-11 PROCEDURE — 33249 INSJ/RPLCMT DEFIB W/LEAD(S): CPT

## 2021-02-11 PROCEDURE — 77030002912 HC SUT ETHBND J&J -A

## 2021-02-11 PROCEDURE — 77030041279 HC DRSG PRMSL AG MDII -B

## 2021-02-11 PROCEDURE — 93005 ELECTROCARDIOGRAM TRACING: CPT | Performed by: INTERNAL MEDICINE

## 2021-02-11 PROCEDURE — 74011250636 HC RX REV CODE- 250/636: Performed by: NURSE ANESTHETIST, CERTIFIED REGISTERED

## 2021-02-11 PROCEDURE — C1898 LEAD, PMKR, OTHER THAN TRANS: HCPCS

## 2021-02-11 PROCEDURE — C1882 AICD, OTHER THAN SING/DUAL: HCPCS

## 2021-02-11 PROCEDURE — 77030031304 HC WAVWGD EIGR DISP INVO -D

## 2021-02-11 PROCEDURE — 83735 ASSAY OF MAGNESIUM: CPT

## 2021-02-11 PROCEDURE — 74011000250 HC RX REV CODE- 250: Performed by: INTERNAL MEDICINE

## 2021-02-11 PROCEDURE — 76060000034 HC ANESTHESIA 1.5 TO 2 HR: Performed by: INTERNAL MEDICINE

## 2021-02-11 PROCEDURE — C1887 CATHETER, GUIDING: HCPCS

## 2021-02-11 PROCEDURE — 74011250636 HC RX REV CODE- 250/636: Performed by: INTERNAL MEDICINE

## 2021-02-11 PROCEDURE — 74011000250 HC RX REV CODE- 250: Performed by: NURSE ANESTHETIST, CERTIFIED REGISTERED

## 2021-02-11 PROCEDURE — 77030022704 HC SUT VLOC COVD -B

## 2021-02-11 PROCEDURE — 33225 L VENTRIC PACING LEAD ADD-ON: CPT | Performed by: INTERNAL MEDICINE

## 2021-02-11 PROCEDURE — C1900 LEAD, CORONARY VENOUS: HCPCS

## 2021-02-11 PROCEDURE — 74011000636 HC RX REV CODE- 636: Performed by: INTERNAL MEDICINE

## 2021-02-11 PROCEDURE — 85027 COMPLETE CBC AUTOMATED: CPT

## 2021-02-11 PROCEDURE — 80048 BASIC METABOLIC PNL TOTAL CA: CPT

## 2021-02-11 PROCEDURE — 85610 PROTHROMBIN TIME: CPT

## 2021-02-11 PROCEDURE — C1769 GUIDE WIRE: HCPCS

## 2021-02-11 PROCEDURE — 77030013687 HC GD NDL BARD -B

## 2021-02-11 PROCEDURE — 74011000272 HC RX REV CODE- 272: Performed by: INTERNAL MEDICINE

## 2021-02-11 PROCEDURE — 33225 L VENTRIC PACING LEAD ADD-ON: CPT

## 2021-02-11 PROCEDURE — 71045 X-RAY EXAM CHEST 1 VIEW: CPT

## 2021-02-11 PROCEDURE — 74011250637 HC RX REV CODE- 250/637: Performed by: INTERNAL MEDICINE

## 2021-02-11 RX ORDER — HYDROMORPHONE HYDROCHLORIDE 2 MG/ML
0.5 INJECTION, SOLUTION INTRAMUSCULAR; INTRAVENOUS; SUBCUTANEOUS
Status: DISCONTINUED | OUTPATIENT
Start: 2021-02-11 | End: 2021-02-11 | Stop reason: HOSPADM

## 2021-02-11 RX ORDER — SODIUM CHLORIDE, SODIUM LACTATE, POTASSIUM CHLORIDE, CALCIUM CHLORIDE 600; 310; 30; 20 MG/100ML; MG/100ML; MG/100ML; MG/100ML
50 INJECTION, SOLUTION INTRAVENOUS CONTINUOUS
Status: DISCONTINUED | OUTPATIENT
Start: 2021-02-11 | End: 2021-02-11

## 2021-02-11 RX ORDER — FENTANYL CITRATE 50 UG/ML
100 INJECTION, SOLUTION INTRAMUSCULAR; INTRAVENOUS AS NEEDED
Status: DISCONTINUED | OUTPATIENT
Start: 2021-02-11 | End: 2021-02-11 | Stop reason: HOSPADM

## 2021-02-11 RX ORDER — EPHEDRINE SULFATE/0.9% NACL/PF 50 MG/5 ML
SYRINGE (ML) INTRAVENOUS AS NEEDED
Status: DISCONTINUED | OUTPATIENT
Start: 2021-02-11 | End: 2021-02-11 | Stop reason: HOSPADM

## 2021-02-11 RX ORDER — METOPROLOL SUCCINATE 25 MG/1
12.5 TABLET, EXTENDED RELEASE ORAL 2 TIMES DAILY
Status: DISCONTINUED | OUTPATIENT
Start: 2021-02-11 | End: 2021-02-12 | Stop reason: HOSPADM

## 2021-02-11 RX ORDER — DIPHENHYDRAMINE HYDROCHLORIDE 50 MG/ML
12.5 INJECTION, SOLUTION INTRAMUSCULAR; INTRAVENOUS ONCE
Status: ACTIVE | OUTPATIENT
Start: 2021-02-11 | End: 2021-02-12

## 2021-02-11 RX ORDER — LIDOCAINE HYDROCHLORIDE 10 MG/ML
0.1 INJECTION INFILTRATION; PERINEURAL AS NEEDED
Status: DISCONTINUED | OUTPATIENT
Start: 2021-02-11 | End: 2021-02-11 | Stop reason: HOSPADM

## 2021-02-11 RX ORDER — LIDOCAINE HYDROCHLORIDE 20 MG/ML
INJECTION, SOLUTION EPIDURAL; INFILTRATION; INTRACAUDAL; PERINEURAL AS NEEDED
Status: DISCONTINUED | OUTPATIENT
Start: 2021-02-11 | End: 2021-02-11 | Stop reason: HOSPADM

## 2021-02-11 RX ORDER — SODIUM CHLORIDE, SODIUM LACTATE, POTASSIUM CHLORIDE, CALCIUM CHLORIDE 600; 310; 30; 20 MG/100ML; MG/100ML; MG/100ML; MG/100ML
1000 INJECTION, SOLUTION INTRAVENOUS CONTINUOUS
Status: DISCONTINUED | OUTPATIENT
Start: 2021-02-11 | End: 2021-02-11

## 2021-02-11 RX ORDER — NALOXONE HYDROCHLORIDE 0.4 MG/ML
0.1 INJECTION, SOLUTION INTRAMUSCULAR; INTRAVENOUS; SUBCUTANEOUS AS NEEDED
Status: DISCONTINUED | OUTPATIENT
Start: 2021-02-11 | End: 2021-02-11 | Stop reason: HOSPADM

## 2021-02-11 RX ORDER — PROPOFOL 10 MG/ML
INJECTION, EMULSION INTRAVENOUS AS NEEDED
Status: DISCONTINUED | OUTPATIENT
Start: 2021-02-11 | End: 2021-02-11 | Stop reason: HOSPADM

## 2021-02-11 RX ORDER — MEXILETINE HYDROCHLORIDE 150 MG/1
150 CAPSULE ORAL EVERY 8 HOURS
Status: DISCONTINUED | OUTPATIENT
Start: 2021-02-11 | End: 2021-02-12 | Stop reason: HOSPADM

## 2021-02-11 RX ORDER — PROPOFOL 10 MG/ML
INJECTION, EMULSION INTRAVENOUS
Status: DISCONTINUED | OUTPATIENT
Start: 2021-02-11 | End: 2021-02-11 | Stop reason: HOSPADM

## 2021-02-11 RX ORDER — ALBUTEROL SULFATE 0.83 MG/ML
2.5 SOLUTION RESPIRATORY (INHALATION) AS NEEDED
Status: DISCONTINUED | OUTPATIENT
Start: 2021-02-11 | End: 2021-02-11 | Stop reason: HOSPADM

## 2021-02-11 RX ORDER — SODIUM CHLORIDE, SODIUM LACTATE, POTASSIUM CHLORIDE, CALCIUM CHLORIDE 600; 310; 30; 20 MG/100ML; MG/100ML; MG/100ML; MG/100ML
75 INJECTION, SOLUTION INTRAVENOUS CONTINUOUS
Status: DISCONTINUED | OUTPATIENT
Start: 2021-02-11 | End: 2021-02-11

## 2021-02-11 RX ORDER — FUROSEMIDE 40 MG/1
40 TABLET ORAL DAILY
Status: DISCONTINUED | OUTPATIENT
Start: 2021-02-12 | End: 2021-02-12 | Stop reason: HOSPADM

## 2021-02-11 RX ORDER — CEFAZOLIN SODIUM/WATER 2 G/20 ML
2 SYRINGE (ML) INTRAVENOUS ONCE
Status: COMPLETED | OUTPATIENT
Start: 2021-02-11 | End: 2021-02-11

## 2021-02-11 RX ORDER — DIPHENHYDRAMINE HYDROCHLORIDE 50 MG/ML
INJECTION, SOLUTION INTRAMUSCULAR; INTRAVENOUS AS NEEDED
Status: DISCONTINUED | OUTPATIENT
Start: 2021-02-11 | End: 2021-02-11 | Stop reason: HOSPADM

## 2021-02-11 RX ORDER — AMIODARONE HYDROCHLORIDE 200 MG/1
200 TABLET ORAL DAILY
Status: DISCONTINUED | OUTPATIENT
Start: 2021-02-12 | End: 2021-02-12 | Stop reason: HOSPADM

## 2021-02-11 RX ORDER — SPIRONOLACTONE 25 MG/1
25 TABLET ORAL DAILY
Status: DISCONTINUED | OUTPATIENT
Start: 2021-02-12 | End: 2021-02-12 | Stop reason: HOSPADM

## 2021-02-11 RX ORDER — OXYCODONE HYDROCHLORIDE 5 MG/1
5 TABLET ORAL
Status: DISCONTINUED | OUTPATIENT
Start: 2021-02-11 | End: 2021-02-11 | Stop reason: HOSPADM

## 2021-02-11 RX ORDER — OXYCODONE HYDROCHLORIDE 5 MG/1
10 TABLET ORAL
Status: DISCONTINUED | OUTPATIENT
Start: 2021-02-11 | End: 2021-02-11 | Stop reason: HOSPADM

## 2021-02-11 RX ORDER — GLYCOPYRROLATE 0.2 MG/ML
INJECTION INTRAMUSCULAR; INTRAVENOUS AS NEEDED
Status: DISCONTINUED | OUTPATIENT
Start: 2021-02-11 | End: 2021-02-11 | Stop reason: HOSPADM

## 2021-02-11 RX ORDER — SODIUM CHLORIDE 0.9 % (FLUSH) 0.9 %
5-40 SYRINGE (ML) INJECTION AS NEEDED
Status: DISCONTINUED | OUTPATIENT
Start: 2021-02-11 | End: 2021-02-12 | Stop reason: HOSPADM

## 2021-02-11 RX ORDER — FENTANYL CITRATE 50 UG/ML
INJECTION, SOLUTION INTRAMUSCULAR; INTRAVENOUS AS NEEDED
Status: DISCONTINUED | OUTPATIENT
Start: 2021-02-11 | End: 2021-02-11 | Stop reason: HOSPADM

## 2021-02-11 RX ORDER — ONDANSETRON 2 MG/ML
4 INJECTION INTRAMUSCULAR; INTRAVENOUS ONCE
Status: ACTIVE | OUTPATIENT
Start: 2021-02-11 | End: 2021-02-12

## 2021-02-11 RX ORDER — LIDOCAINE HYDROCHLORIDE AND EPINEPHRINE 10; 10 MG/ML; UG/ML
30 INJECTION, SOLUTION INFILTRATION; PERINEURAL
Status: DISCONTINUED | OUTPATIENT
Start: 2021-02-11 | End: 2021-02-11 | Stop reason: HOSPADM

## 2021-02-11 RX ORDER — LISINOPRIL 5 MG/1
5 TABLET ORAL 2 TIMES DAILY
Status: DISCONTINUED | OUTPATIENT
Start: 2021-02-11 | End: 2021-02-12 | Stop reason: HOSPADM

## 2021-02-11 RX ORDER — HYDROCODONE BITARTRATE AND ACETAMINOPHEN 5; 325 MG/1; MG/1
1 TABLET ORAL
Status: DISCONTINUED | OUTPATIENT
Start: 2021-02-11 | End: 2021-02-12 | Stop reason: HOSPADM

## 2021-02-11 RX ORDER — SODIUM CHLORIDE, SODIUM LACTATE, POTASSIUM CHLORIDE, CALCIUM CHLORIDE 600; 310; 30; 20 MG/100ML; MG/100ML; MG/100ML; MG/100ML
INJECTION, SOLUTION INTRAVENOUS
Status: DISCONTINUED | OUTPATIENT
Start: 2021-02-11 | End: 2021-02-11 | Stop reason: HOSPADM

## 2021-02-11 RX ORDER — MIDAZOLAM HYDROCHLORIDE 1 MG/ML
2 INJECTION, SOLUTION INTRAMUSCULAR; INTRAVENOUS
Status: DISCONTINUED | OUTPATIENT
Start: 2021-02-11 | End: 2021-02-11 | Stop reason: HOSPADM

## 2021-02-11 RX ORDER — ACETAMINOPHEN 325 MG/1
650 TABLET ORAL
Status: DISCONTINUED | OUTPATIENT
Start: 2021-02-11 | End: 2021-02-12 | Stop reason: HOSPADM

## 2021-02-11 RX ORDER — SODIUM CHLORIDE 0.9 % (FLUSH) 0.9 %
5-40 SYRINGE (ML) INJECTION EVERY 8 HOURS
Status: DISCONTINUED | OUTPATIENT
Start: 2021-02-11 | End: 2021-02-12 | Stop reason: HOSPADM

## 2021-02-11 RX ORDER — SODIUM CHLORIDE, SODIUM LACTATE, POTASSIUM CHLORIDE, CALCIUM CHLORIDE 600; 310; 30; 20 MG/100ML; MG/100ML; MG/100ML; MG/100ML
100 INJECTION, SOLUTION INTRAVENOUS CONTINUOUS
Status: DISCONTINUED | OUTPATIENT
Start: 2021-02-11 | End: 2021-02-11

## 2021-02-11 RX ORDER — CEFAZOLIN SODIUM/WATER 2 G/20 ML
2 SYRINGE (ML) INTRAVENOUS EVERY 8 HOURS
Status: COMPLETED | OUTPATIENT
Start: 2021-02-11 | End: 2021-02-12

## 2021-02-11 RX ADMIN — Medication 10 ML: at 17:35

## 2021-02-11 RX ADMIN — IOPAMIDOL 100 ML: 755 INJECTION, SOLUTION INTRAVENOUS at 13:42

## 2021-02-11 RX ADMIN — PHENYLEPHRINE HYDROCHLORIDE 50 MCG: 10 INJECTION INTRAVENOUS at 14:27

## 2021-02-11 RX ADMIN — PHENOL 1 SPRAY: 1.5 LIQUID ORAL at 17:26

## 2021-02-11 RX ADMIN — LISINOPRIL 5 MG: 5 TABLET ORAL at 17:26

## 2021-02-11 RX ADMIN — NEOMYCIN AND POLYMYXIN B SULFATES 1000 ML: 40; 200000 SOLUTION IRRIGATION at 13:42

## 2021-02-11 RX ADMIN — PROPOFOL 50 MCG/KG/MIN: 10 INJECTION, EMULSION INTRAVENOUS at 13:30

## 2021-02-11 RX ADMIN — CEFAZOLIN 2 G: 10 INJECTION, POWDER, FOR SOLUTION INTRAVENOUS at 21:07

## 2021-02-11 RX ADMIN — LIDOCAINE HYDROCHLORIDE 100 MG: 20 INJECTION, SOLUTION EPIDURAL; INFILTRATION; INTRACAUDAL; PERINEURAL at 13:30

## 2021-02-11 RX ADMIN — Medication 10 MG: at 14:14

## 2021-02-11 RX ADMIN — Medication 2 G: at 13:34

## 2021-02-11 RX ADMIN — FENTANYL CITRATE 50 MCG: 50 INJECTION INTRAMUSCULAR; INTRAVENOUS at 13:42

## 2021-02-11 RX ADMIN — MEXILETINE HYDROCHLORIDE 150 MG: 150 CAPSULE ORAL at 17:48

## 2021-02-11 RX ADMIN — PROPOFOL 20 MG: 10 INJECTION, EMULSION INTRAVENOUS at 13:32

## 2021-02-11 RX ADMIN — FENTANYL CITRATE 25 MCG: 50 INJECTION INTRAMUSCULAR; INTRAVENOUS at 13:47

## 2021-02-11 RX ADMIN — SODIUM CHLORIDE, SODIUM LACTATE, POTASSIUM CHLORIDE, AND CALCIUM CHLORIDE: 600; 310; 30; 20 INJECTION, SOLUTION INTRAVENOUS at 13:24

## 2021-02-11 RX ADMIN — METOPROLOL SUCCINATE 12.5 MG: 25 TABLET, EXTENDED RELEASE ORAL at 17:27

## 2021-02-11 RX ADMIN — Medication 5 ML: at 21:56

## 2021-02-11 RX ADMIN — GLYCOPYRROLATE 0.1 MG: 0.2 INJECTION, SOLUTION INTRAMUSCULAR; INTRAVENOUS at 13:42

## 2021-02-11 RX ADMIN — LIDOCAINE HYDROCHLORIDE AND EPINEPHRINE 300 MG: 10; 10 INJECTION, SOLUTION INFILTRATION; PERINEURAL at 13:40

## 2021-02-11 RX ADMIN — HYDROCODONE BITARTRATE AND ACETAMINOPHEN 1 TABLET: 5; 325 TABLET ORAL at 20:38

## 2021-02-11 RX ADMIN — PHENYLEPHRINE HYDROCHLORIDE 50 MCG: 10 INJECTION INTRAVENOUS at 14:39

## 2021-02-11 RX ADMIN — DIPHENHYDRAMINE HYDROCHLORIDE 12.5 MG: 50 INJECTION, SOLUTION INTRAMUSCULAR; INTRAVENOUS at 13:42

## 2021-02-11 RX ADMIN — Medication 10 MG: at 14:50

## 2021-02-11 RX ADMIN — PHENYLEPHRINE HYDROCHLORIDE 100 MCG: 10 INJECTION INTRAVENOUS at 14:50

## 2021-02-11 RX ADMIN — FENTANYL CITRATE 25 MCG: 50 INJECTION INTRAMUSCULAR; INTRAVENOUS at 13:53

## 2021-02-11 RX ADMIN — PROPOFOL 30 MG: 10 INJECTION, EMULSION INTRAVENOUS at 13:30

## 2021-02-11 NOTE — PROGRESS NOTES
Patient received to [address] [place] [address] room # 9  Ambulatory from [facility]. Patient scheduled for Biv ICD upgrade today with Dr Genie Matthew. Procedure reviewed & questions answered, voiced good understanding consent obtained & placed on chart. All medications and medical history reviewed. Will prep patient per orders. Patient & family updated on plan of care. The patient has a fraility score of 3-MANAGING WELL, based on ability to perform ADLs independently.

## 2021-02-11 NOTE — PROGRESS NOTES
Patient given box lunch and drink. Alert with no complaints. Left subclavian site with no bleeding or hematoma.

## 2021-02-11 NOTE — PROCEDURES
Pre-Procedure Diagnosis  1. Chronic systolic heart failure, ejection fraction of 15%. 2. Ischemic dilated cardiomyopathy. 3. Class III heart failure symptoms. 4. Chronic systolic heart failure for a minimum of 3 months duration on optimal medical therapy. 5. Primary prevention indications for defibrillator  6. Life expectancy greater than 1 year. 7. LBBB with QRS duration of 135-150 msec. 8. Single chamber ICD LENIN    Procedure Performed  1. Insertion of [employer] biventricular implantable cardioverter defibrillator. 2. Insertion of left ventricular lead at time of new system Implantation. 3. Insertion of right atrial lead    Anesthesia: MAC     Estimated Blood Loss: Less than 10 mL     Specimens: * No specimens in log *     The procedure, indications, risks, benefits, and alternatives were discussed with the patient and family members, who desired to proceed after questions were answered and informed consent was documented. Procedure: After informed consent was obtained, the patient was brought to the Electrophysiology Laboratory in a fasting state and was prepped and draped in sterile fashion. Prophylactic antibiotic was administered 10 minutes prior to skin incision (refer to anesthesia procedural documentation for details). MAC was administered by the anesthesia team with continuous oxygen saturation measurement and blood pressure measurement. Local anesthetic (sensorcaine) was delivered to the left pectoral region and an incision was made parallel to the deltopectoral groove. The subcutaneous pocket was opened and extended using blunt dissection and electrocautery, and adequate hemostasis was established. The existing single chamber ICD and lead were freed from fibrous tissue and removed from the pocket. Access x 2 was obtained under ultrasound guidance using a modified Seldinger technique with placement of 2 wires down into the RA and advanced below the diaphragm.   A Safe-sheath was then placed over a guidewire. A permanent pacing lead was advanced under fluoroscopic guidance and positioned in the right atrium at the location of the RAA. Stable RA appendage position with satisfactory sensing and pacing characteristics was obtained. The sheath was peeled. The lead was sutured to the underlying pectoralis fascia using 2 non-absorbable sutures around the lead's collar. The lead slack and position was assessed under fluoroscopy while securing the lead collar to ensure proper length. After positioning the RA lead, a standard [email or web address] LV delivery sheath was advanced over the long access wire and dilator and wire were removed. The braided inner sheath was then advanced into the [place] and used to cannulate the coronary sinus using contrast when necessary. A Glide wire and was used to allow a smooth transition into the CS body. A coronary sinus venogram was then performed using a balloon occluding catheter. A Merit renal inner sheath was used with an Acuity Straight trak to cannulate a posterolateral branch. The renal was advanced into the posterolateral and a subselected venogram was performed. The left ventricular lead was then advanced into a posterolateral brach over an angioplasty wire. Adequate thresholds were obtained with an adequate margin between phrenic stim and loss of capture. The delivery sheaths were then slit with fluoroscopy demonstrating stable position. The lead was then sutured to the underlying pectoralis fascia using 2 non-absorbable sutures around the lead collar. Final lead testing via the pacing system analyzer (PSA) demonstrated stable sensing, impedance and pacing thresholds. The existing single chamber ICD RV lead was removed from the generator and the ICD generator was removed. The lead pins were then cleaned with antibiotic soaked gauze, dried gently, and attached to a new biventricular defibrillator generator.  Pins were directly observed to pass the tip electrode, and the ring hex wrench screws were secured, and leads tug tested. The pocket was irrigated copiously with a saline antimicrobial solution. The device and leads were gently positioned within the pocket. The wound was closed with multiple layers of absorbable suture followed by skin closure with staples. Fluoroscopic images were interpreted by me, in multiple projections. Final device position was confirmed by stored fluoroscopic image from device pocket to lead tips in AP projection. The patient tolerated the procedure well and left the lab in good condition. Lead Data:    Pulse Generator Model #  Serial # Location Implant   [id number] [facility] [id number] Left Pectoral Implant   [id number] [facility] [id number] Left Pectoral Explant  DOI:5.17.2011     Lead Model Number  Serial Number Lead position Implant   RA [id number] [facility] [id number] RA Appendage Implant   RV 7120Q/52 [facility] [id number] RV [facility] Chronic  DOI:5.17.2011   LV 1458Q/86 [facility] [id number] LCV Implant     Lead Sensitivity and Threshold  Lead R or P sensitivity (mv) Threshold (V) Threshold PW (msec) Impedance (ohms) Final output Voltage (V) Final PW (msec)   RA 1.9 1.00 0.40 660 2.50 0.40   RV 11.4 1.50 0.50 330 Auto 0.50   LV - 1.00 0.40 1200 Auto 0.40     Bradycardia Settings  Ezio Mode LRL URL Pace AVD (ms) Sense AVD (ms) Rate Response Mode Switching Mode SW Rate   DDD 50 130 150 100 Off On 180       Tachycardia Settings  Zone Type VT-1 VT-2 VF   ON/OFF/  MONITOR ON ON ON   Zone Rate 125 166 222   1st Therapy Type ATP x8 ATP ATP   Energy (J)      2nd Therapy Type  Shock Shock   Energy (J)  30J 30J   3rd Therapy Type  Shock Shock   Energy (J)  40J 40J   4th Therapy Type  Shock Shock   Energy (J)  40J 40J   5th Therapy Type   Shock   Energy (J)   40J   6th Therapy Type   Shock   Energy (J)   40J     Defibrillation Threshold Testing  DFT# How induced Successful test? Shock Imped (ohms) Energy (J) Charge time (sec) Rescue needed?  Defib threshold (J) Contrast: 35 ml     Fluro Time: 7.5     Snare Used: No    Complications: None    IMPRESSION: Successful upgrade of a single chamber ICD to a [employer] biventricular  implantable cardioverter defibrillator for primary prevention of sudden death. Felipe Michele MD, [address]  Clinical Cardiac Electrophysiology  2/11/2021  2:43 PM

## 2021-02-11 NOTE — ANESTHESIA POSTPROCEDURE EVALUATION
Procedure(s):  BIV ICD UPGRADE/ ST NAY.    total IV anesthesia    Anesthesia Post Evaluation      Multimodal analgesia: multimodal analgesia used between 6 hours prior to anesthesia start to PACU discharge  Patient location during evaluation: PACU  Patient participation: complete - patient participated  Level of consciousness: awake and alert  Pain management: adequate  Airway patency: patent  Anesthetic complications: no  Cardiovascular status: acceptable  Respiratory status: acceptable  Hydration status: acceptable  Post anesthesia nausea and vomiting:  none  Final Post Anesthesia Temperature Assessment:  Normothermia (36.0-37.5 degrees C)      INITIAL Post-op Vital signs:   Vitals Value Taken Time   /72 02/11/21 1545   Temp     Pulse 82 02/11/21 1554   Resp     SpO2 97 % 02/11/21 1554   Vitals shown include unvalidated device data.

## 2021-02-11 NOTE — PROGRESS NOTES
TRANSFER - IN REPORT:    Verbal report received from TOOTIE Queen(name) on Del Sharma  being received from Cath Lab(unit) for routine progression of care      Report consisted of patients Situation, Background, Assessment and   Recommendations(SBAR). Information from the following report(s) SBAR, Kardex, OR Summary, Procedure Summary and Intake/Output was reviewed with the receiving nurse. Opportunity for questions and clarification was provided. Assessment completed upon patients arrival to unit and care assumed. 100 [facility] c/d/i. RadialPatient instructed to not lift, push, pull with left arm. Keep in sling. Oriented to room and use of call light. Placed on tele monitor with [facility] cycling blood pressure. Skin assessment reveals: warm dry skin. Scatter scars. 100 [facility] c/d/i. Sacrum and heel intact and without any skin breakdown.

## 2021-02-11 NOTE — PROGRESS NOTES
TRANSFER - OUT REPORT:    Verbal report given to [employer] (name) on Jean Foster  being transferred to room 323(unit) for routine progression of care       Report consisted of patients Situation, Background, Assessment and   Recommendations(SBAR). Information from the following report(s) Procedure Summary was reviewed with the receiving nurse. Lines:   Peripheral IV 02/11/21 Left Antecubital (Active)       Peripheral IV 02/11/21 Right Arm (Active)        Opportunity for questions and clarification was provided.       Patient transported with:   Hospital transport

## 2021-02-11 NOTE — PROGRESS NOTES
Report received from [address] [facility] and [address] [place] [address]. Procedural findings communicated. Intra procedural  medication administration reviewed. Progression of care discussed. Patient received into [address] [facility] 5 post procedure.      Incision site without bleeding or swelling     Dressing dry and intact     Patient instructed to limit movement to left upper extremity    Routine post procedural vital signs and site assessment initiated

## 2021-02-11 NOTE — ANESTHESIA PREPROCEDURE EVALUATION
Relevant Problems   No relevant active problems       Anesthetic History   No history of anesthetic complications            Review of Systems / Medical History  Patient summary reviewed, nursing notes reviewed and pertinent labs reviewed    Pulmonary          Smoker         Neuro/Psych         TIA     Cardiovascular    Hypertension      CHF    Pacemaker, CAD and cardiac stents      Comments: Ischemic cardiomyopathy with EF 20-25%- s/p ICD now for BiV upgrade   GI/Hepatic/Renal     GERD: well controlled           Endo/Other  Within defined limits           Other Findings   Comments: Pt is 3 weeks post asymptomatic Covid 19 infxn           Physical Exam    Airway  Mallampati: II           Cardiovascular    Rhythm: regular           Dental         Pulmonary  Breath sounds clear to auscultation               Abdominal         Other Findings            Anesthetic Plan    ASA: 4  Anesthesia type: total IV anesthesia          Induction: Intravenous  Anesthetic plan and risks discussed with: Patient

## 2021-02-12 VITALS
HEIGHT: 69 IN | BODY MASS INDEX: 21.42 KG/M2 | WEIGHT: 144.6 LBS | DIASTOLIC BLOOD PRESSURE: 91 MMHG | HEART RATE: 79 BPM | RESPIRATION RATE: 18 BRPM | OXYGEN SATURATION: 97 % | SYSTOLIC BLOOD PRESSURE: 138 MMHG | TEMPERATURE: 98 F

## 2021-02-12 LAB
ATRIAL RATE: 86 BPM
CALCULATED P AXIS, ECG09: 81 DEGREES
CALCULATED R AXIS, ECG10: -104 DEGREES
CALCULATED T AXIS, ECG11: 70 DEGREES
DIAGNOSIS, 93000: NORMAL
P-R INTERVAL, ECG05: 142 MS
Q-T INTERVAL, ECG07: 408 MS
QRS DURATION, ECG06: 116 MS
QTC CALCULATION (BEZET), ECG08: 488 MS
VENTRICULAR RATE, ECG03: 86 BPM

## 2021-02-12 PROCEDURE — 74011250637 HC RX REV CODE- 250/637: Performed by: INTERNAL MEDICINE

## 2021-02-12 PROCEDURE — 96374 THER/PROPH/DIAG INJ IV PUSH: CPT

## 2021-02-12 PROCEDURE — 74011250636 HC RX REV CODE- 250/636: Performed by: INTERNAL MEDICINE

## 2021-02-12 PROCEDURE — 99218 HC RM OBSERVATION: CPT

## 2021-02-12 PROCEDURE — 74011000250 HC RX REV CODE- 250: Performed by: INTERNAL MEDICINE

## 2021-02-12 RX ORDER — HYDROCODONE BITARTRATE AND ACETAMINOPHEN 5; 325 MG/1; MG/1
1 TABLET ORAL
Qty: 10 TAB | Refills: 0 | Status: SHIPPED
Start: 2021-02-12 | End: 2021-02-15

## 2021-02-12 RX ADMIN — CEFAZOLIN 2 G: 10 INJECTION, POWDER, FOR SOLUTION INTRAVENOUS at 06:02

## 2021-02-12 RX ADMIN — METOPROLOL SUCCINATE 12.5 MG: 25 TABLET, EXTENDED RELEASE ORAL at 08:56

## 2021-02-12 RX ADMIN — MEXILETINE HYDROCHLORIDE 150 MG: 150 CAPSULE ORAL at 08:56

## 2021-02-12 RX ADMIN — HYDROCODONE BITARTRATE AND ACETAMINOPHEN 1 TABLET: 5; 325 TABLET ORAL at 06:51

## 2021-02-12 RX ADMIN — LISINOPRIL 5 MG: 5 TABLET ORAL at 08:56

## 2021-02-12 RX ADMIN — AMIODARONE HYDROCHLORIDE 200 MG: 200 TABLET ORAL at 08:56

## 2021-02-12 RX ADMIN — HYDROCODONE BITARTRATE AND ACETAMINOPHEN 1 TABLET: 5; 325 TABLET ORAL at 01:49

## 2021-02-12 RX ADMIN — MEXILETINE HYDROCHLORIDE 150 MG: 150 CAPSULE ORAL at 01:49

## 2021-02-12 RX ADMIN — FUROSEMIDE 40 MG: 40 TABLET ORAL at 08:56

## 2021-02-12 RX ADMIN — Medication 10 ML: at 06:02

## 2021-02-12 RX ADMIN — SPIRONOLACTONE 25 MG: 25 TABLET ORAL at 08:56

## 2021-02-12 NOTE — PROGRESS NOTES
Bedside and Verbal shift change report to be given to TOOTIE Moody (oncoming nurse) by self (offgoing nurse). Report included the following information SBAR, Kardex, Procedure Summary, Intake/Output and MAR.

## 2021-02-12 NOTE — PROGRESS NOTES
Care Management Interventions  Transition of Care Consult (CM Consult): Discharge Planning  Discharge Durable Medical Equipment: No  Physical Therapy Consult: No  Occupational Therapy Consult: No  Discharge Location  Discharge Placement: Home    Chart screened by  for discharge planning. No needs identified at this time. Please consult  if any new issues arise. Pt was admitted for elective biv icd upgrade. Procedure was done 02/11/20. Pt has discharge order. Pt states he has a PCP- Dr Chula Goss. No CM needs identified.

## 2021-02-12 NOTE — DISCHARGE INSTRUCTIONS
Patient Education        ICD (Implantable Cardioverter-Defibrillator) Placement: What to Expect at Home  Your Recovery     Implantable cardioverter-defibrillator (ICD) placement is surgery to put an ICD in your chest. An ICD is a small, battery-powered device that fixes life-threatening changes in your heartbeat. Your doctor made a cut (incision) in your upper chest. The doctor placed the ICD and one or two leads (wires) under the skin of your chest. The leads were put into the heart through a blood vessel. Your chest may be sore where the doctor made the cut (incision) and put in the ICD. You also may have a bruise and mild swelling. These symptoms usually get better in 1 to 2 weeks. You may feel a hard ridge along the incision. This usually gets softer in the months after surgery. You probably will be able to see and feel the outline of the ICD under your skin. You will probably be able to go back to work or your usual routine 1 to 2 weeks after surgery. Your doctor will talk to you about how often you will need to have the ICD checked. You'll need to take steps to safely use electric devices. Some of these devices can stop your ICD from working right for a short time. Check with your doctor about what to avoid and what to keep a short distance away from your ICD. For example, you will need to stay away from things with strong magnetic and electrical fields. An example is an MRI machine (unless your ICD is safe for an MRI). You can use a cellphone and other wireless devices, but keep them at least 6 inches away from your ICD. Many household and office electronics don't affect an ICD. These include kitchen appliances and computers. This care sheet gives you a general idea about how long it will take for you to recover. But each person recovers at a different pace. Follow the steps below to get better as quickly as possible. How can you care for yourself at home? Activity    · Rest when you feel tired.  Getting enough sleep will help you recover.     · Try to walk each day. Start by walking a little more than you did the day before. Bit by bit, increase the amount you walk. Walking boosts blood flow and helps prevent pneumonia and constipation.     · For 4 to 6 weeks:  ? Avoid activities that strain your chest or upper arm muscles. This includes pushing a  or vacuum, mopping floors, swimming, or swinging a golf club or tennis racquet. ? Do not raise your arm, on the side of your body where the ICD is located, above shoulder level. ? Avoid strenuous activities, such as bicycle riding, jogging, weight lifting, or heavy aerobic exercise. ? Avoid lifting anything that would make you strain. This may include heavy grocery bags and milk containers, a heavy briefcase or backpack, cat litter or dog food bags, or a child.     · Ask your doctor when you can drive again.     · You will probably need to take about 1 to 2 weeks off from work. It depends on the type of work you do and how you feel.     · Ask your doctor when it is okay for you to have sex. Diet    · You can eat your normal diet. If your stomach is upset, try bland, low-fat foods like plain rice, broiled chicken, toast, and yogurt.     · Drink plenty of fluids (unless your doctor tells you not to). Medicines    · Your doctor will tell you if and when you can restart your medicines. You will also get instructions about taking any new medicines.     · If you take aspirin or some other blood thinner, ask your doctor if and when to start taking it again. Make sure that you understand exactly what your doctor wants you to do.     · Take pain medicines exactly as directed. ? If the doctor gave you a prescription medicine for pain, take it as prescribed. ? If you are not taking a prescription pain medicine, ask your doctor if you can take an over-the-counter medicine.   ? Do not take aspirin, ibuprofen (Advil, Motrin), naproxen (Aleve), or other nonsteroidal anti-inflammatory drugs (NSAIDs) unless your doctor says it is okay.     · If you think your pain medicine is making you sick to your stomach:  ? Take your medicine after meals (unless your doctor has told you not to). ? Ask your doctor for a different pain medicine.     · If your doctor prescribed antibiotics, take them as directed. Do not stop taking them just because you feel better. You need to take the full course of antibiotics. Incision care    · Keep the area clean and dry.     · Ask your doctor when you can shower or take a bath.     · If you have strips of tape on the incision, leave the tape on for a week or until it falls off.     · Wash the area daily with warm, soapy water, and pat it dry. Don't use hydrogen peroxide or alcohol, which can slow healing. You may cover the area with a gauze bandage if it weeps or rubs against clothing. Exercise    · Check with your doctor before you start an exercise program. Your doctor may recommend that you work with a physical therapist to learn how to exercise safely with an ICD. Other instructions    · Carry your ICD identification card with you at all times. The card should include the ICD  and model information.     · Wear medical alert jewelry that states you have an ICD. You can buy this at most drugstores.     · Have your ICD checked as often as your doctor recommends. In some cases, this may be done over the phone or online. Your doctor will give you instructions about how to do this.     · Talk with your doctor about the possibility of turning off the ICD at the end of life. You can put your wishes about the ICD in an advance directive. Follow-up care is a key part of your treatment and safety. Be sure to make and go to all appointments, and call your doctor if you are having problems. It's also a good idea to know your test results and keep a list of the medicines you take. When should you call for help?    Call 911 anytime you think you may need emergency care. For example, call if:    · You passed out (lost consciousness).     · You have trouble breathing. Call your doctor now or seek immediate medical care if:    · You receive a shock from your ICD.     · You are dizzy or lightheaded, or you feel like you may faint.     · You have pain that does not get better after you take pain medicine.     · You hear an alarm or feel a vibration from your ICD.     · You have loose stitches, or your incision comes open.     · Bright red blood has soaked through the bandage over your incision.     · You have signs of infection, such as:  ? Increased pain, swelling, warmth, or redness. ? Red streaks leading from the incision. ? Pus draining from the incision. ? A fever. Watch closely for changes in your health, and be sure to contact your doctor if you have any problems. Where can you learn more? Go to http://www.gray.com/  Enter K184 in the search box to learn more about \"ICD (Implantable Cardioverter-Defibrillator) Placement: What to Expect at Home. \"  Current as of: December 16, 2019               Content Version: 12.6  © 9820-2590 [employer]. Care instructions adapted under license by [employer] (which disclaims liability or warranty for this information). If you have questions about a medical condition or this instruction, always ask your healthcare professional. [address] any warranty or liability for your use of this information. Patient Education      Hydrocodone/Acetaminophen (Vicodin, Norco, Lortab) - (By mouth)   Why this medicine is used:   Treats pain.   Contact a nurse or doctor right away if you have:  · Blistering, peeling, red skin rash  · Fast or slow heartbeat, shallow breathing, blue lips, fingernails, or skin  · Anxiety, restlessness, muscle spasms, twitching, seeing or hearing things that are not there  · Dark urine or pale stools, yellow skin or eyes  · Extreme weakness, sweating, seizures, cold or clammy skin  · Lightheadedness, dizziness, fainting, fever, sweating     Common side effects:  · Constipation, nausea, vomiting, loss of appetite, stomach pain  · Tiredness or sleepiness  © 2017 [facility] Information is for End User's use only and may not be sold, redistributed or otherwise used for commercial purposes.

## 2021-02-12 NOTE — PROGRESS NOTES
[facility] CARDIOLOGY PROGRESS NOTE           2/12/2021 7:36 AM    Admit Date: 2/11/2021    Admit Diagnosis: Ventricular tachycardia (paroxysmal) ([address] [I47.2]; Ischemic cardiomyopathy [I25.5];LBBB (left bundle branch block) [I44.7]    Assessment:   Active Problems:    Ischemic cardiomyopathy (10/14/2009)      LBBB (left bundle branch block) (5/2/2019)        Plan:   1. Severe ICM - s/p CRT-D upgrade. Stable wound, device parameters and CXR. Continue GDMT. 2. Dispo - likely dc today. Thank you for allowing me to participate in the electrophysiologic care of this most pleasant patient. Please feel free to contact me if there are any questions or concerns. Anthony Schmitt. Harsh Magana MD, MS  Clinical Cardiac Electrophysiology  [facility]    Subjective:   No complaints this AM, no chest pain or shortness of breath. Appropriate post op pain. Interval History: (History of pertinent interval events obtained from nursing staff)    ROS:  GEN:  No fever or chills  Cardiovascular:  As noted above  Pulmonary:  As noted above  Neuro:  No new focal motor or sensory loss      Objective:     Vitals:    02/11/21 2237 02/12/21 0149 02/12/21 0154 02/12/21 0504   BP: (!) 126/92 121/85 121/85 (!) 152/79   Pulse: 90 80 80 69   Resp: 16   18   Temp: 97.7 °F (36.5 °C)   97.6 °F (36.4 °C)   SpO2: 94%   95%   Weight:    144 lb 9.6 oz (65.6 kg)   Height:           Physical Exam:  General-Well Developed, Well Nourished, No Acute Distress, Alert & Oriented x 3, appropriate mood. Neck- supple, no JVD  CV- regular rate and rhythm no MRG, left sided CIED C/D/I. Lung- clear bilaterally  Abd- soft, nontender, nondistended  Ext- no edema bilaterally.   Skin- warm and dry    Current Facility-Administered Medications   Medication Dose Route Frequency    amiodarone (CORDARONE) tablet 200 mg  200 mg Oral DAILY    lisinopriL (PRINIVIL, ZESTRIL) tablet 5 mg  5 mg Oral BID    furosemide (LASIX) tablet 40 mg  40 mg Oral DAILY    metoprolol succinate (TOPROL-XL) XL tablet 12.5 mg  12.5 mg Oral BID    mexiletine (MEXITIL) capsule 150 mg  150 mg Oral Q8H    spironolactone (ALDACTONE) tablet 25 mg  25 mg Oral DAILY    sodium chloride (NS) flush 5-40 mL  5-40 mL IntraVENous Q8H    sodium chloride (NS) flush 5-40 mL  5-40 mL IntraVENous PRN    acetaminophen (TYLENOL) tablet 650 mg  650 mg Oral Q4H PRN    HYDROcodone-acetaminophen (NORCO) 5-325 mg per tablet 1 Tab  1 Tab Oral Q4H PRN    phenol throat spray (CHLORASEPTIC) 1 Spray  1 Spray Oral PRN    influenza vaccine 2020-21 (6 mos+)(PF) (FLUARIX/FLULAVAL/FLUZONE QUAD) injection 0.5 mL  0.5 mL IntraMUSCular PRIOR TO DISCHARGE       Data Review:   Recent Results (from the past 24 hour(s))   CBC W/O DIFF    Collection Time: 02/11/21 11:50 AM   Result Value Ref Range    WBC 7.2 4.3 - 11.1 K/uL    RBC 4.88 4.23 - 5.6 M/uL    HGB 14.3 13.6 - 17.2 g/dL    HCT 44.9 41.1 - 50.3 %    MCV 92.0 79.6 - 97.8 FL    MCH 29.3 26.1 - 32.9 PG    MCHC 31.8 31.4 - 35.0 g/dL    RDW 14.3 11.9 - 14.6 %    PLATELET 742 231 - 352 K/uL    MPV 10.4 9.4 - 12.3 FL    ABSOLUTE NRBC 0.00 0.0 - 0.2 K/uL   MAGNESIUM    Collection Time: 02/11/21 11:50 AM   Result Value Ref Range    Magnesium 2.1 1.8 - 2.4 mg/dL   METABOLIC PANEL, BASIC    Collection Time: 02/11/21 11:50 AM   Result Value Ref Range    Sodium 140 138 - 145 mmol/L    Potassium 4.0 3.5 - 5.1 mmol/L    Chloride 108 (H) 98 - 107 mmol/L    CO2 27 21 - 32 mmol/L    Anion gap 5 (L) 7 - 16 mmol/L    Glucose 85 65 - 100 mg/dL    BUN 9 8 - 23 MG/DL    Creatinine 1.17 0.8 - 1.5 MG/DL    GFR est AA >60 >60 ml/min/1.73m2    GFR est non-AA >60 >60 ml/min/1.73m2    Calcium 9.1 8.3 - 10.4 MG/DL   PROTHROMBIN TIME + INR    Collection Time: 02/11/21 11:50 AM   Result Value Ref Range    Prothrombin time 18.1 (H) 12.5 - 14.7 sec    INR 1.5     EKG, 12 LEAD, INITIAL    Collection Time: 02/11/21 12:15 PM   Result Value Ref Range    Ventricular Rate 88 BPM    Atrial Rate 88 BPM    P-R Interval 168 ms    QRS Duration 130 ms    Q-T Interval 410 ms    QTC Calculation (Bezet) 496 ms    Calculated P Axis 83 degrees    Calculated R Axis 41 degrees    Calculated T Axis -145 degrees    Diagnosis       Sinus rhythm with occasional Premature ventricular complexes  Non-specific intra-ventricular conduction block  ST elevation, consider early repolarization, pericarditis, or injury  T wave abnormality, consider inferolateral ischemia  Abnormal ECG  When compared with ECG of 10-NOV-2018 04:38,  Premature ventricular complexes are now Present  QRS duration has decreased  QT has lengthened  Confirmed by ESTIVEN NGUYEN ()Viky ([id number]) on 2/11/2021 2:57:09 PM     EKG, 12 LEAD, INITIAL    Collection Time: 02/11/21  5:30 PM   Result Value Ref Range    Ventricular Rate 86 BPM    Atrial Rate 86 BPM    P-R Interval 142 ms    QRS Duration 116 ms    Q-T Interval 408 ms    QTC Calculation (Bezet) 488 ms    Calculated P Axis 81 degrees    Calculated R Axis -104 degrees    Calculated T Axis 70 degrees    Diagnosis       Atrial-sensed ventricular-paced rhythm with frequent Premature ventricular   complexes  Abnormal ECG  No previous ECGs available  Confirmed by ESTIVEN NGUYEN ()Viky ([id number]) on 2/12/2021 5:53:46 AM         EKG:  (EKG has been independently visualized by me with interpretation below): A paced BIV paced with PVCs.

## 2021-02-12 NOTE — ROUTINE PROCESS
Bedside and Verbal shift change report given to self (oncoming nurse) by Nilton Chavez RN (offgoing nurse). Report included the following information SBAR, Kardex, ED Summary, Procedure Summary, Intake/Output, MAR, Recent Results and Cardiac Rhythm Paced.

## 2021-02-12 NOTE — DISCHARGE SUMMARY
[facility] Cardiology Discharge Summary     Patient ID:  Hiram Irvin  [id number]  89 y.o.  1945    Admit date: 2/11/2021    Discharge date:  2/12/2021    Admitting Physician: Ofelia Richmond MD     Discharge Physician: Liudmila Mitchell NP/Dr. Pineda Smart    Admission Diagnoses: Ventricular tachycardia (paroxysmal) ([address] [I47.2]  Ischemic cardiomyopathy [I25.5]  LBBB (left bundle branch block) [I44.7]    Discharge Diagnoses:    Diagnosis    LBBB (left bundle branch block)    VT (ventricular tachycardia) ([address]    AICD (automatic cardioverter/defibrillator) present    Chronic systolic heart failure ([address]    Coronary atherosclerosis of native coronary artery    Ischemic cardiomyopathy    Mixed hyperlipidemia    HTN (hypertension), benign       Cardiology Procedures this admission:  biventricular ICD upgrade, CXR  Consults: None    Hospital Course: Patient was seen at the office of [facility] Cardiology by Dr. Ricardo Morrison for management of chronic systolic heart failure with LBBB and was subsequently scheduled for an AM Admission ICD upgrade at [facility] on 2/11/21. Patient was taken to the EP lab and underwent successful implantation of [place] biventricular ICD by Dr. Pineda Smart. Patient tolerated the procedure well and was taken to the telemetry floor for recovery. Follow up chest xray showed no pneumothorax. The following morning patient was up feeling well without any complaints of chest pain, shortness of breath, or palpitations. Patient's left subclavian cath site was clean, dry and intact without hematoma. Patient's labs were WNL. Patient was seen and examined by Dr. Pineda Smart and determined stable and ready for discharge. Patient was instructed on the importance of medication compliance and outpatient follow up. Patient has been scheduled for follow-up with [facility] Cardiology -- device clinic in 10-14 days.     DISPOSITION: Patient has been instructed to keep affected arm below shoulder level for the next 4 weeks or until cleared by doctor. The arm sling should be worn while sleeping. The dressing will be removed at follow-up. The incision site must be kept clean and dry. The patient may shower in a few days. Lotions, powders, or creams should be avoided as these can increase the risk of infection. The affected arm should not be used for any pushing, pulling, or lifting until cleared by doctor. Driving is prohibited until cleared by doctor in a follow up appointment. Any signs of infection including increased redness, suspicious drainage, or unexplained fever should be reported to the doctor immediately by calling [phone]. Discharge Exam:  Patient has been seen by Dr. Pineda Smart: see his progress note for exam details.   Visit Vitals  BP (!) 152/79 (BP 1 Location: Right upper arm, BP Patient Position: At rest)   Pulse 69   Temp 97.6 °F (36.4 °C)   Resp 18   Ht 5' 9\" (1.753 m)   Wt 65.6 kg (144 lb 9.6 oz)   SpO2 95%   BMI 21.35 kg/m²         Recent Results (from the past 24 hour(s))   CBC W/O DIFF    Collection Time: 02/11/21 11:50 AM   Result Value Ref Range    WBC 7.2 4.3 - 11.1 K/uL    RBC 4.88 4.23 - 5.6 M/uL    HGB 14.3 13.6 - 17.2 g/dL    HCT 44.9 41.1 - 50.3 %    MCV 92.0 79.6 - 97.8 FL    MCH 29.3 26.1 - 32.9 PG    MCHC 31.8 31.4 - 35.0 g/dL    RDW 14.3 11.9 - 14.6 %    PLATELET 336 640 - 155 K/uL    MPV 10.4 9.4 - 12.3 FL    ABSOLUTE NRBC 0.00 0.0 - 0.2 K/uL   MAGNESIUM    Collection Time: 02/11/21 11:50 AM   Result Value Ref Range    Magnesium 2.1 1.8 - 2.4 mg/dL   METABOLIC PANEL, BASIC    Collection Time: 02/11/21 11:50 AM   Result Value Ref Range    Sodium 140 138 - 145 mmol/L    Potassium 4.0 3.5 - 5.1 mmol/L    Chloride 108 (H) 98 - 107 mmol/L    CO2 27 21 - 32 mmol/L    Anion gap 5 (L) 7 - 16 mmol/L    Glucose 85 65 - 100 mg/dL    BUN 9 8 - 23 MG/DL    Creatinine 1.17 0.8 - 1.5 MG/DL    GFR est AA >60 >60 ml/min/1.73m2    GFR est non-AA >60 >60 ml/min/1.73m2    Calcium 9.1 8.3 - 10.4 MG/DL   PROTHROMBIN TIME + INR    Collection Time: 02/11/21 11:50 AM   Result Value Ref Range    Prothrombin time 18.1 (H) 12.5 - 14.7 sec    INR 1.5     EKG, 12 LEAD, INITIAL    Collection Time: 02/11/21 12:15 PM   Result Value Ref Range    Ventricular Rate 88 BPM    Atrial Rate 88 BPM    P-R Interval 168 ms    QRS Duration 130 ms    Q-T Interval 410 ms    QTC Calculation (Bezet) 496 ms    Calculated P Axis 83 degrees    Calculated R Axis 41 degrees    Calculated T Axis -145 degrees    Diagnosis       Sinus rhythm with occasional Premature ventricular complexes  Non-specific intra-ventricular conduction block  ST elevation, consider early repolarization, pericarditis, or injury  T wave abnormality, consider inferolateral ischemia  Abnormal ECG  When compared with ECG of 10-NOV-2018 04:38,  Premature ventricular complexes are now Present  QRS duration has decreased  QT has lengthened  Confirmed by ESTIVEN NGUYEN ()Hilda ([id number]) on 2/11/2021 2:57:09 PM     EKG, 12 LEAD, INITIAL    Collection Time: 02/11/21  5:30 PM   Result Value Ref Range    Ventricular Rate 86 BPM    Atrial Rate 86 BPM    P-R Interval 142 ms    QRS Duration 116 ms    Q-T Interval 408 ms    QTC Calculation (Bezet) 488 ms    Calculated P Axis 81 degrees    Calculated R Axis -104 degrees    Calculated T Axis 70 degrees    Diagnosis       Atrial-sensed ventricular-paced rhythm with frequent Premature ventricular   complexes  Abnormal ECG  No previous ECGs available  Confirmed by ESTIVEN NGUYEN ()Hilda ([id number]) on 2/12/2021 5:53:46 AM           Patient Instructions:   Current Discharge Medication List      START taking these medications    Details   HYDROcodone-acetaminophen (NORCO) 5-325 mg per tablet Take 1 Tab by mouth every four (4) hours as needed for Pain for up to 3 days. Max Daily Amount: 6 Tabs.   Qty: 10 Tab, Refills: 0    Associated Diagnoses: Pacemaker         CONTINUE these medications which have NOT CHANGED    Details   enalapril (VASOTEC) 5 mg tablet Take 1 Tab by mouth two (2) times a day. Qty: 180 Tab, Refills: 3      metoprolol succinate (TOPROL-XL) 25 mg XL tablet Take 0.5 Tabs by mouth two (2) times a day. Qty: 90 Tab, Refills: 3      warfarin (COUMADIN) 2 mg tablet Take 1 Tab by mouth daily. Qty: 90 Tab, Refills: 3      amiodarone (CORDARONE) 200 mg tablet Take 1 Tab by mouth daily. Qty: 30 Tab, Refills: 5      spironolactone (ALDACTONE) 25 mg tablet Take 1 Tab by mouth daily. Qty: 90 Tab, Refills: 3      furosemide (LASIX) 40 mg tablet Take 40 mg by mouth daily. mexiletine (MEXITIL) 150 mg capsule Take 1 Cap by mouth every eight (8) hours.   Qty: 90 Cap, Refills: 0               Signed:  Elbert Cha NP  2/12/2021 6:18 AM

## 2022-02-15 PROBLEM — I27.20 PULMONARY HTN (HCC): Status: ACTIVE | Noted: 2022-02-15

## 2022-02-18 NOTE — PROGRESS NOTES
Attempted to call patient for pre-assessment, no answer and voicemail full. Patient needs to hold Eliquis on Sunday night and Monday morning, and hold aldactone morning of procedure.

## 2022-02-21 ENCOUNTER — APPOINTMENT (OUTPATIENT)
Dept: NON INVASIVE DIAGNOSTICS | Age: 77
End: 2022-02-21
Attending: INTERNAL MEDICINE
Payer: MEDICARE

## 2022-02-21 ENCOUNTER — HOSPITAL ENCOUNTER (OUTPATIENT)
Age: 77
Setting detail: OUTPATIENT SURGERY
Discharge: HOME | End: 2022-02-21
Attending: INTERNAL MEDICINE | Admitting: INTERNAL MEDICINE
Payer: MEDICARE

## 2022-02-21 VITALS
OXYGEN SATURATION: 99 % | WEIGHT: 148 LBS | HEART RATE: 66 BPM | DIASTOLIC BLOOD PRESSURE: 75 MMHG | SYSTOLIC BLOOD PRESSURE: 129 MMHG | TEMPERATURE: 98.2 F | BODY MASS INDEX: 21.92 KG/M2 | RESPIRATION RATE: 13 BRPM | HEIGHT: 69 IN

## 2022-02-21 DIAGNOSIS — I27.20 PULMONARY HTN (HCC): ICD-10-CM

## 2022-02-21 LAB
ANION GAP SERPL CALC-SCNC: 7 MMOL/L (ref 7–16)
ATRIAL RATE: 71 BPM
BUN SERPL-MCNC: 19 MG/DL (ref 8–23)
CALCIUM SERPL-MCNC: 8.7 MG/DL (ref 8.3–10.4)
CALCULATED P AXIS, ECG09: 80 DEGREES
CALCULATED R AXIS, ECG10: -98 DEGREES
CALCULATED T AXIS, ECG11: 65 DEGREES
CHLORIDE SERPL-SCNC: 109 MMOL/L (ref 98–107)
CO2 SERPL-SCNC: 25 MMOL/L (ref 21–32)
CREAT SERPL-MCNC: 1.4 MG/DL (ref 0.8–1.5)
DIAGNOSIS, 93000: NORMAL
ECHO AO ASC DIAM: 3.1 CM
ECHO AO ASCENDING AORTA INDEX: 1.7 CM/M2
ECHO AO ROOT DIAM: 3.2 CM
ECHO AO ROOT INDEX: 1.76 CM/M2
ECHO AV AREA PEAK VELOCITY: 2.5 CM2
ECHO AV AREA VTI: 2.2 CM2
ECHO AV AREA/BSA PEAK VELOCITY: 1.4 CM2/M2
ECHO AV AREA/BSA VTI: 1.2 CM2/M2
ECHO AV MEAN GRADIENT: 3 MMHG
ECHO AV MEAN VELOCITY: 0.8 M/S
ECHO AV PEAK GRADIENT: 7 MMHG
ECHO AV PEAK VELOCITY: 1.3 M/S
ECHO AV VELOCITY RATIO: 0.69
ECHO AV VTI: 24 CM
ECHO EST RA PRESSURE: 3 MMHG
ECHO IVC PROX: 1.6 CM
ECHO LA AREA 2C: 29.2 CM2
ECHO LA AREA 4C: 22.5 CM2
ECHO LA DIAMETER INDEX: 2.14 CM/M2
ECHO LA DIAMETER: 3.9 CM
ECHO LA MAJOR AXIS: 6.6 CM
ECHO LA MINOR AXIS: 6.6 CM
ECHO LA TO AORTIC ROOT RATIO: 1.22
ECHO LA VOL BP: 79 ML (ref 18–58)
ECHO LA VOL/BSA BIPLANE: 43 ML/M2 (ref 16–34)
ECHO LV E' LATERAL VELOCITY: 7 CM/S
ECHO LV E' SEPTAL VELOCITY: 6 CM/S
ECHO LV EDV A2C: 333 ML
ECHO LV EDV A4C: 316 ML
ECHO LV EDV INDEX A4C: 174 ML/M2
ECHO LV EDV NDEX A2C: 183 ML/M2
ECHO LV EJECTION FRACTION A2C: 20 %
ECHO LV EJECTION FRACTION A4C: 21 %
ECHO LV EJECTION FRACTION BIPLANE: 20 % (ref 55–100)
ECHO LV ESV A2C: 269 ML
ECHO LV ESV A4C: 249 ML
ECHO LV ESV INDEX A2C: 148 ML/M2
ECHO LV ESV INDEX A4C: 137 ML/M2
ECHO LV FRACTIONAL SHORTENING: 6 % (ref 28–44)
ECHO LV INTERNAL DIMENSION DIASTOLE INDEX: 3.9 CM/M2
ECHO LV INTERNAL DIMENSION DIASTOLIC: 7.1 CM (ref 4.2–5.9)
ECHO LV INTERNAL DIMENSION SYSTOLIC INDEX: 3.68 CM/M2
ECHO LV INTERNAL DIMENSION SYSTOLIC: 6.7 CM
ECHO LV IVSD: 1.1 CM (ref 0.6–1)
ECHO LV MASS 2D: 372 G (ref 88–224)
ECHO LV MASS INDEX 2D: 204.4 G/M2 (ref 49–115)
ECHO LV POSTERIOR WALL DIASTOLIC: 1.1 CM (ref 0.6–1)
ECHO LV RELATIVE WALL THICKNESS RATIO: 0.31
ECHO LVOT AREA: 3.8 CM2
ECHO LVOT AV VTI INDEX: 0.57
ECHO LVOT DIAM: 2.2 CM
ECHO LVOT MEAN GRADIENT: 1 MMHG
ECHO LVOT PEAK GRADIENT: 3 MMHG
ECHO LVOT PEAK VELOCITY: 0.9 M/S
ECHO LVOT STROKE VOLUME INDEX: 28.6 ML/M2
ECHO LVOT SV: 52.1 ML
ECHO LVOT VTI: 13.7 CM
ECHO MV A VELOCITY: 0.57 M/S
ECHO MV E DECELERATION TIME (DT): 187 MS
ECHO MV E VELOCITY: 0.84 M/S
ECHO MV E/A RATIO: 1.47
ECHO MV E/E' LATERAL: 12
ECHO MV E/E' RATIO (AVERAGED): 13
ECHO MV E/E' SEPTAL: 14
ECHO RIGHT VENTRICULAR SYSTOLIC PRESSURE (RVSP): 42 MMHG
ECHO RV BASAL DIMENSION: 4.1 CM
ECHO RV TAPSE: 1.5 CM (ref 1.5–2)
ECHO TV REGURGITANT MAX VELOCITY: 3.14 M/S
ECHO TV REGURGITANT PEAK GRADIENT: 39 MMHG
ERYTHROCYTE [DISTWIDTH] IN BLOOD BY AUTOMATED COUNT: 14.6 % (ref 11.9–14.6)
GLUCOSE SERPL-MCNC: 96 MG/DL (ref 65–100)
HCT VFR BLD AUTO: 43.5 % (ref 41.1–50.3)
HGB BLD-MCNC: 14.1 G/DL (ref 13.6–17.2)
MCH RBC QN AUTO: 30.3 PG (ref 26.1–32.9)
MCHC RBC AUTO-ENTMCNC: 32.4 G/DL (ref 31.4–35)
MCV RBC AUTO: 93.3 FL (ref 79.6–97.8)
NRBC # BLD: 0 K/UL (ref 0–0.2)
P-R INTERVAL, ECG05: 152 MS
PLATELET # BLD AUTO: 239 K/UL (ref 150–450)
PMV BLD AUTO: 10.1 FL (ref 9.4–12.3)
POTASSIUM SERPL-SCNC: 4.4 MMOL/L (ref 3.5–5.1)
Q-T INTERVAL, ECG07: 490 MS
QRS DURATION, ECG06: 154 MS
QTC CALCULATION (BEZET), ECG08: 532 MS
RBC # BLD AUTO: 4.66 M/UL (ref 4.23–5.6)
SODIUM SERPL-SCNC: 141 MMOL/L (ref 136–145)
VENTRICULAR RATE, ECG03: 71 BPM
WBC # BLD AUTO: 9.6 K/UL (ref 4.3–11.1)

## 2022-02-21 PROCEDURE — C1769 GUIDE WIRE: HCPCS | Performed by: INTERNAL MEDICINE

## 2022-02-21 PROCEDURE — C1894 INTRO/SHEATH, NON-LASER: HCPCS | Performed by: INTERNAL MEDICINE

## 2022-02-21 PROCEDURE — 93005 ELECTROCARDIOGRAM TRACING: CPT | Performed by: INTERNAL MEDICINE

## 2022-02-21 PROCEDURE — C1751 CATH, INF, PER/CENT/MIDLINE: HCPCS | Performed by: INTERNAL MEDICINE

## 2022-02-21 PROCEDURE — 80048 BASIC METABOLIC PNL TOTAL CA: CPT

## 2022-02-21 PROCEDURE — 99153 MOD SED SAME PHYS/QHP EA: CPT | Performed by: INTERNAL MEDICINE

## 2022-02-21 PROCEDURE — 74011000250 HC RX REV CODE- 250: Performed by: INTERNAL MEDICINE

## 2022-02-21 PROCEDURE — 74011250636 HC RX REV CODE- 250/636: Performed by: INTERNAL MEDICINE

## 2022-02-21 PROCEDURE — 93451 RIGHT HEART CATH: CPT | Performed by: INTERNAL MEDICINE

## 2022-02-21 PROCEDURE — 85027 COMPLETE CBC AUTOMATED: CPT

## 2022-02-21 PROCEDURE — C8929 TTE W OR WO FOL WCON,DOPPLER: HCPCS

## 2022-02-21 PROCEDURE — 99152 MOD SED SAME PHYS/QHP 5/>YRS: CPT | Performed by: INTERNAL MEDICINE

## 2022-02-21 PROCEDURE — 77030013687 HC GD NDL BARD -B: Performed by: INTERNAL MEDICINE

## 2022-02-21 RX ORDER — LIDOCAINE HYDROCHLORIDE 10 MG/ML
INJECTION INFILTRATION; PERINEURAL AS NEEDED
Status: DISCONTINUED | OUTPATIENT
Start: 2022-02-21 | End: 2022-02-21 | Stop reason: HOSPADM

## 2022-02-21 RX ORDER — FENTANYL CITRATE 50 UG/ML
INJECTION, SOLUTION INTRAMUSCULAR; INTRAVENOUS AS NEEDED
Status: DISCONTINUED | OUTPATIENT
Start: 2022-02-21 | End: 2022-02-21 | Stop reason: HOSPADM

## 2022-02-21 RX ORDER — MIDAZOLAM HYDROCHLORIDE 1 MG/ML
INJECTION, SOLUTION INTRAMUSCULAR; INTRAVENOUS AS NEEDED
Status: DISCONTINUED | OUTPATIENT
Start: 2022-02-21 | End: 2022-02-21 | Stop reason: HOSPADM

## 2022-02-21 RX ORDER — SODIUM CHLORIDE 9 MG/ML
75 INJECTION, SOLUTION INTRAVENOUS CONTINUOUS
Status: DISCONTINUED | OUTPATIENT
Start: 2022-02-21 | End: 2022-02-21 | Stop reason: HOSPADM

## 2022-02-21 RX ADMIN — PERFLUTREN 1 ML: 6.52 INJECTION, SUSPENSION INTRAVENOUS at 09:25

## 2022-02-21 NOTE — PROGRESS NOTES
TRANSFER - OUT REPORT:    Verbal report given to Chloe Felty, RN(name) on Adriel Poplin  being transferred to CPRU(unit) for routine progression of care       Report consisted of patients Situation, Background, Assessment and   Recommendations(SBAR). Information from the following report(s) SBAR was reviewed with the receiving nurse.     C with Dr Raine PATEL Brachial access  7fr sheath left in place  Covered with tegaderm  Versed 2mg  Fentanyl 50mcg

## 2022-02-21 NOTE — Clinical Note
Right brachial vein. Accessed successfully. Micropuncture needle used. Using ultrasound guidance. Number of attempts =  4.

## 2022-02-21 NOTE — Clinical Note
History and physical documented and up to date, allergies reviewed, lab results reviewed, pre-procedure education provided, patient verbalized understanding of procedure, procedural consent signed and patient is NPO. Double O-Z Plasty Text: The defect edges were debeveled with a #15 scalpel blade.  Given the location of the defect, shape of the defect and the proximity to free margins a Double O-Z plasty (double transposition flap) was deemed most appropriate.  Using a sterile surgical marker, the appropriate transposition flaps were drawn incorporating the defect and placing the expected incisions within the relaxed skin tension lines where possible. The area thus outlined was incised deep to adipose tissue with a #15 scalpel blade.  The skin margins were undermined to an appropriate distance in all directions utilizing iris scissors.  Hemostasis was achieved with electrocautery.  The flaps were then transposed into place, one clockwise and the other counterclockwise, and anchored with interrupted buried subcutaneous sutures.

## 2022-02-21 NOTE — PROGRESS NOTES
Report received from [address] [place] [address]. Procedural finding communicated. Intra procedural medication administration reviewed. Progression of care discussed. Patient received into CPRU room 7, Post RHC    Access site without bleeding or swelling. Right upper arm, sheath remains in place    Patient instructed to limit movement of right upper extremity. Routine post procedural vital signs & site assessment initiated.

## 2022-02-21 NOTE — DISCHARGE INSTRUCTIONS
HEART CATHETERIZATION/ANGIOGRAPHY DISCHARGE INSTRUCTIONS    1. Check puncture site frequently for swelling or bleeding. If there is any bleeding, apply pressure over the area with a clean towel or washcloth and call 911. Notify your doctor for any redness, swelling, drainage, or oozing from the puncture site. Notify your doctor for any fever or chills. 2. If the extremity becomes cold, numb, or painful call Dr. Pitts S [facility] [address] Cardiology at [phone].  3. Activity should be limited for the next 48 hours. No heavy lifting, pushing, pulling  or strenuous activity for 48 hours. No heavy lifting (anything over 10 pounds) for 3 days. 4. You may resume your usual diet. Drink more fluids than usual.  5. Have a responsible person drive you home and stay with you for at least 24 hours after your heart catheterization/angiography. 6. You may remove bandage from your Right and Arm in 24 hours. You may shower in 24 hours. No tub baths, hot tubs, or swimming for 1 week. Do not place any lotions, creams, powders, or ointments over puncture site for 1 week. You may place a clean band-aid over the puncture site each day for 5 days. Change daily. Sedation for a Medical Procedure: Care Instructions     You were given a sedative medication during your visit. While many of the effects will have worn   off before you leave; you may continue to feel some effects for several hours. Common side effects from sedation include:  · Feeling sleepy. (Your doctors and nurses will make sure you are not too sleepy to go home.)  · Nausea and vomiting. This usually does not last long. · Feeling tired. How can you care for yourself at home? Activity    · Don't do anything for 24 hours that requires attention to detail. It takes time for the medicine effects to completely wear off. · Do not make important legal decisions for 24 hours. · Do not sign any legal documents for 24 hours.      · Do not drink alcohol today     · For your safety, you should not drive or operate heavy machinery for the remainder of the day     · Rest when you feel tired. Getting enough sleep will help you recover. Diet    · You can eat your normal diet, unless your doctor gives you other instructions. If your stomach is upset, try clear liquids and bland, low-fat foods like plain toast or rice. · Drink plenty of fluids (unless your doctor tells you not to). · Don't drink alcohol for 24 hours. Medicines    · Be safe with medicines. Read and follow all instructions on the label. · If the doctor gave you a prescription medicine for pain, take it as prescribed. · If you are not taking a prescription pain medicine, ask your doctor if you can take an over-the-counter medicine. · If you think your pain medicine is making you sick to your stomach:  · Take your medicine after meals (unless your doctor has told you not to). · Ask your doctor for a different pain medicine. I have read the above instructions and have had the opportunity to ask questions.       Patient: ________________________   Date: _____________    Witness: _______________________   Date: _____________

## 2022-02-21 NOTE — Clinical Note
Left brachial vein. Accessed unsuccessfully. Micropuncture needle used. Using ultrasound guidance. Number of attempts =  3.

## 2022-02-21 NOTE — PROGRESS NOTES
Assisted OOB & ambulated to BR & on unit. Tolerated activity without difficulty.  Right upper arm without bleeding or hematoma

## 2022-02-21 NOTE — PROGRESS NOTES
Patient received to [address] [place] [address] room # 11  Ambulatory from [facility]. Patient scheduled for RHC today with Dr Valeri Aranda. Procedure reviewed & questions answered, voiced good understanding consent obtained & placed on chart. All medications and medical history reviewed. Will prep patient per orders. Patient & family updated on plan of care.       The patient has a fraility score of 3-MANAGING WELL, based on age and abilities

## 2022-02-21 NOTE — PROGRESS NOTES
7FR venous sheath removed from right brachial using sterile technique. Manual pressure held over site for 5 minutes. Hemostasis achieved. right brachial without bleeding without hematoma. Sterile gauze placed over site and secured with tegaderm. Patient instructed to keep right arm straight and still. Patient verbalizes understanding.

## 2022-02-21 NOTE — Clinical Note
TRANSFER - IN REPORT:     Verbal report received from: cpru rn . Report consisted of patient's Situation, Background, Assessment and   Recommendations(SBAR). Opportunity for questions and clarification was provided. Assessment completed upon patient's arrival to unit and care assumed. Patient transported with a Registered Nurse.

## 2022-02-21 NOTE — Clinical Note
TRANSFER - OUT REPORT:     Verbal report given to: cpru rn . Report consisted of patient's Situation, Background, Assessment and   Recommendations(SBAR). Opportunity for questions and clarification was provided.

## 2022-02-21 NOTE — Clinical Note
Sheath #1: Sheath: inserted. Sheath inserted/placed in the right brachialcephalic  vein.  Into existing IV

## 2022-03-18 PROBLEM — I50.1 PULMONARY EDEMA CARDIAC CAUSE (HCC): Status: ACTIVE | Noted: 2017-09-03

## 2022-03-18 PROBLEM — I47.20 VT (VENTRICULAR TACHYCARDIA): Status: ACTIVE | Noted: 2018-10-31

## 2022-03-18 PROBLEM — I27.20 PULMONARY HTN (HCC): Status: ACTIVE | Noted: 2022-02-15

## 2022-03-18 PROBLEM — I75.022 BLUE TOE SYNDROME OF LEFT LOWER EXTREMITY (HCC): Status: ACTIVE | Noted: 2018-01-10

## 2022-03-18 PROBLEM — I47.20 VENTRICULAR TACHYCARDIA: Status: ACTIVE | Noted: 2018-10-16

## 2022-03-18 PROBLEM — G45.9 TIA (TRANSIENT ISCHEMIC ATTACK): Status: ACTIVE | Noted: 2019-10-15

## 2022-03-19 PROBLEM — I51.3 LEFT VENTRICULAR APICAL THROMBUS: Status: ACTIVE | Noted: 2019-10-15

## 2022-03-19 PROBLEM — I44.7 LBBB (LEFT BUNDLE BRANCH BLOCK): Status: ACTIVE | Noted: 2019-05-02

## 2022-03-19 PROBLEM — K40.90 UNILATERAL INGUINAL HERNIA: Status: ACTIVE | Noted: 2018-01-10

## 2022-03-19 PROBLEM — E87.6 HYPOKALEMIA: Status: ACTIVE | Noted: 2018-10-16

## 2022-03-20 PROBLEM — R73.9 ACUTE HYPERGLYCEMIA: Status: ACTIVE | Noted: 2017-09-03

## 2022-07-01 NOTE — TELEPHONE ENCOUNTER
Requested Prescriptions     Signed Prescriptions Disp Refills    apixaban (ELIQUIS) 5 MG TABS tablet 180 tablet 2     Sig: Take 1 tablet by mouth 2 times daily     Authorizing Provider: Inocencio Vargas     Ordering User: Kimi Lagunas

## 2022-09-06 PROCEDURE — 93296 REM INTERROG EVL PM/IDS: CPT | Performed by: INTERNAL MEDICINE

## 2022-09-06 PROCEDURE — 93295 DEV INTERROG REMOTE 1/2/MLT: CPT | Performed by: INTERNAL MEDICINE

## 2022-09-20 NOTE — PROGRESS NOTES
[facility] CARDIOLOGY PROGRESS NOTE 
 
10/20/2018 11:04 AM 
 
Admit Date: 10/16/2018 Admit Diagnosis: afib;Atrial fibrillation with RVR ([facility] [address] Subjective:  
Stable overnight without angina, CHF, or palpitations. Vitals stable and controlled. No other complaints overnight. Tolerating meds well. No ICD shocks, no VT overnight. BP and HR stable. Objective:  
  
Vitals:  
 10/20/18 0139 10/20/18 9163 10/20/18 1936 10/20/18 9237 BP: 92/66 95/67 102/60 102/60 Pulse: (!) 53 (!) 54 (!) 50 (!) 51 Resp: 18 18 18 Temp: 97.6 °F (36.4 °C) 97.5 °F (36.4 °C)  97.3 °F (36.3 °C) SpO2: 95% 97%  96% Weight:  63.6 kg (140 lb 4.8 oz) Physical Exam: 
Neck- supple, no JVD 
CV- regular rate and rhythm no MRG Lung- clear bilaterally Abd- soft, nontender, nondistended Ext- no edema Skin- warm and dry Data Review:  
Recent Labs 10/20/18 
3256 10/19/18 
5143  136  
K 3.9 3.4* MG 2.1 2.1 BUN 10 9 CREA 1.33 1.06  
GLU 81 104* WBC 9.8 8.1 HGB 14.6 14.4 HCT 43.1 42.3  156 Assessment and Plan:  
 
Principal Problem: 
  Vent[facility] tachycardia ([facility] (10/16/2018)- improved, resolved, continue amio as below Active Problems: 
  Coronary atherosclerosis of native coronary artery- stable, no angina, continue meds Mixed hyperlipidemia -stable, continue meds HTN (hypertension), benign -stable, continue meds Chronic systolic heart failure (HCC) - euvolemic, continue meds but decrease as below. ICD (implantable cardioverter-defibrillator) discharge - s/p 37 shocks, resolved, see below Hypokalemia (10/16/2018)- repleted HOME LATER TODAY AFTER LUNCH IF BP STABLE AND FEELS WELL 
TC7 VISIT WITH DR. CONTRERAS, [address] IF NEEDED DECREASE COREG TO 3.125MG BID AT DISCHARGE 
HOME ON AMIODARONE 400MG BID X 5 DAYS, THEN DECREASE TO 400MG DAILY BMP AND MG ON Tuesday PRIOR TO DR. CONTRERAS'S FOLLOW UP VISIT ANTONIO Byers MD 
[facility] Cardiology Pager [phone] Inflammation Suggestive Of Cancer Camouflage Histology Text: There was a dense lymphocytic infiltrate which prevented adequate histologic evaluation of adjacent structures.

## 2022-11-18 DIAGNOSIS — I47.20 VENTRICULAR TACHYCARDIA: ICD-10-CM

## 2022-11-18 DIAGNOSIS — I25.5 ISCHEMIC CARDIOMYOPATHY: ICD-10-CM

## 2022-11-18 DIAGNOSIS — I50.22 CHRONIC SYSTOLIC HEART FAILURE (HCC): ICD-10-CM

## 2022-11-18 DIAGNOSIS — Z45.02 ICD (IMPLANTABLE CARDIOVERTER-DEFIBRILLATOR) DISCHARGE: ICD-10-CM

## 2022-11-18 DIAGNOSIS — I25.5 ISCHEMIC CARDIOMYOPATHY: Primary | ICD-10-CM

## 2022-11-22 ENCOUNTER — TELEPHONE (OUTPATIENT)
Dept: CARDIOLOGY CLINIC | Age: 77
End: 2022-11-22

## 2022-11-22 NOTE — TELEPHONE ENCOUNTER
Oscar alert for VT monitor episode on 11/21 for 34 seconds w/ rates 140's to 170's, Patient w/ toggling rate in the monitor zone. Episode self aborted. Patient symptomatic w/ \" slight dizziness. \"     Patient w/ hx of VT on amio 200 QD. Recent stable labs. Recent admission for HF, has Dr. Karin Howard appt in a few weeks. EP appt and device scheduled to adjust VT zone.

## 2022-12-01 ENCOUNTER — OFFICE VISIT (OUTPATIENT)
Dept: CARDIOLOGY CLINIC | Age: 77
End: 2022-12-01

## 2022-12-01 VITALS
HEIGHT: 69 IN | DIASTOLIC BLOOD PRESSURE: 82 MMHG | BODY MASS INDEX: 20.57 KG/M2 | SYSTOLIC BLOOD PRESSURE: 134 MMHG | HEART RATE: 58 BPM | WEIGHT: 138.9 LBS

## 2022-12-01 DIAGNOSIS — I25.5 ISCHEMIC CARDIOMYOPATHY: Primary | ICD-10-CM

## 2022-12-01 DIAGNOSIS — I25.5 ISCHEMIC CARDIOMYOPATHY: ICD-10-CM

## 2022-12-01 DIAGNOSIS — I47.20 VENTRICULAR TACHYCARDIA: ICD-10-CM

## 2022-12-01 DIAGNOSIS — I10 HTN (HYPERTENSION), BENIGN: ICD-10-CM

## 2022-12-01 DIAGNOSIS — Z45.02 ICD (IMPLANTABLE CARDIOVERTER-DEFIBRILLATOR) DISCHARGE: ICD-10-CM

## 2022-12-01 RX ORDER — FUROSEMIDE 40 MG/1
TABLET ORAL
COMMUNITY
Start: 2022-11-12

## 2022-12-01 RX ORDER — NIACIN 500 MG/1
TABLET, EXTENDED RELEASE ORAL
COMMUNITY
Start: 2022-11-12

## 2022-12-01 RX ORDER — ALBUTEROL SULFATE 90 UG/1
AEROSOL, METERED RESPIRATORY (INHALATION)
COMMUNITY
Start: 2022-11-07

## 2022-12-01 RX ORDER — FLUTICASONE FUROATE AND VILANTEROL TRIFENATATE 100; 25 UG/1; UG/1
POWDER RESPIRATORY (INHALATION)
COMMUNITY
Start: 2022-11-30

## 2022-12-01 RX ORDER — ACETAMINOPHEN 500 MG
TABLET ORAL EVERY 4 HOURS PRN
COMMUNITY
Start: 2018-11-05

## 2022-12-01 NOTE — PROGRESS NOTES
[address] [place], [place]  [address], [address]  [facility], [address] [place] [address]  PHONE: [phone]  1945    SUBJECTIVE:   Jenna Cavazos is a 78 y.o. male seen for a follow up visit regarding the following:     Chief Complaint   Patient presents with    Irregular Heart Beat    Device Check         HPI:    Jenna Cavazos is a very pleasant 78 y.o. male with a past medical and cardiac history significant for HFrEF, ICM, HTN, ICD, VT and presents for follow up of recurrent VT. PT has ATP treated VT in 9/22 in the setting of COVID. He had slow episode of VT recently below his detection. He had some lightheadedness, dizziness. Cardiac PMH: (Old records have been reviewed and summarized below)  TTE (2/21/22): EF 20-25%, LV apical thrombus    Reviewed office note Dr. Amna Everett 4/4/22    Past Medical History, Past Surgical History, Family history, Social History, and Medications were all reviewed with the patient today and updated as necessary. Current Outpatient Medications   Medication Sig Dispense Refill    albuterol sulfate HFA (PROVENTIL;VENTOLIN;PROAIR) 108 (90 Base) MCG/ACT inhaler INHALE 1 TO 2 PUFFS EVERY 4 TO 6 HOURS AS NEEDED      acetaminophen (TYLENOL) 500 MG tablet Take by mouth every 4 hours as needed      BREO ELLIPTA 100-25 MCG/ACT AEPB       furosemide (LASIX) 40 MG tablet TAKE 1 TABLET BY MOUTH EVERY DAY      niacin (NIASPAN) 500 MG extended release tablet TAKE 1 TABLET BY MOUTH EVERY DAY      apixaban (ELIQUIS) 5 MG TABS tablet Take 1 tablet by mouth 2 times daily 180 tablet 2    amiodarone (CORDARONE) 200 MG tablet Take 200 mg by mouth daily      enalapril (VASOTEC) 5 MG tablet Take 5 mg by mouth 2 times daily      metoprolol succinate (TOPROL XL) 25 MG extended release tablet Take 12.5 mg by mouth 2 times daily      spironolactone (ALDACTONE) 25 MG tablet Take 25 mg by mouth daily       No current facility-administered medications for this visit.      No Known Allergies  [unfilled]  Past Medical History:   Diagnosis Date    AICD (automatic cardioverter/defibrillator) present 2016    Arrhythmia     irregular    Blue toe syndrome of left lower extremity ([facility] [address] 1/10/2018    CAD (coronary artery disease)     stent    Chronic systolic heart failure (HCC)     Coronary atherosclerosis of native coronary artery     GERD (gastroesophageal reflux disease)     Hypertension     Ischemic cardiomyopathy     Non-nicotine vapor product user     Pulmonary edema cardiac cause ([address]     TIA (transient ischemic attack) 10/15/2019    Tobacco use disorder 2016     Past Surgical History:   Procedure Laterality Date    CARDIAC CATHETERIZATION      2009    OTHER SURGICAL HISTORY      scrotum    PACEMAKER      [place] CARDIAC SURG PROCEDURE UNLIST      6 stents; last one put in 2 years ago 2009     Family History   Problem Relation Age of Onset    Heart Disease Father     Cancer Sister      Social History     Tobacco Use    Smoking status: Some Days     Packs/day: 0.50     Types: Cigarettes     Last attempt to quit: 10/1/2018     Years since quittin.1    Smokeless tobacco: Never    Tobacco comments:     Quit smoking: quit smoking approx. 30 days ago. Substance Use Topics    Alcohol use:  Yes     Alcohol/week: 6.0 standard drinks       PHYSICAL EXAM:    /82   Pulse 58   Ht 5' 9\" (1.753 m)   Wt 138 lb 14.4 oz (63 kg)   BMI 20.51 kg/m²      Wt Readings from Last 5 Encounters:   22 138 lb 14.4 oz (63 kg)   22 156 lb (70.8 kg)   02/15/22 148 lb (67.1 kg)   21 143 lb (64.9 kg)   10/11/21 141 lb (64 kg)       BP Readings from Last 5 Encounters:   22 134/82   22 (!) 172/86   02/15/22 (!) 198/120   21 126/70   10/11/21 (!) 190/90       General appearance: Alert, well appearing, and in no distress   CV: RRR, no M/R/G, no JVD, normal distal pulses, no lower extremity edema  Pulm: Clear to auscultation, no wheezes, no crackles, no accessory muscle use  GI: Soft, nontender, nondistended  Neuro: Alert and oriented    Medical problems and test results were reviewed with the patient today. Lab Results   Component Value Date/Time    K 4.4 02/21/2022 08:30 AM     Creatinine   Date Value Ref Range Status   02/21/2022 1.40 0.8 - 1.5 MG/DL Final     Lab Results   Component Value Date/Time    HGB 14.1 02/21/2022 08:30 AM     Lab Results   Component Value Date/Time    INR 3.2 09/08/2021 11:47 AM    INR 2.4 08/19/2021 01:34 PM    INR 1.8 08/04/2021 01:57 PM       Lab Results   Component Value Date    WBC 9.6 02/21/2022    HGB 14.1 02/21/2022    HCT 43.5 02/21/2022    MCV 93.3 02/21/2022     02/21/2022      Lab Results   Component Value Date/Time     02/21/2022 08:30 AM    K 4.4 02/21/2022 08:30 AM     02/21/2022 08:30 AM    CO2 25 02/21/2022 08:30 AM    BUN 19 02/21/2022 08:30 AM    CREATININE 1.40 02/21/2022 08:30 AM    GLUCOSE 96 02/21/2022 08:30 AM    CALCIUM 8.7 02/21/2022 08:30 AM         EKG: (EKG has been independently visualized by me with interpretation below)      Milvia Gallo was seen today for irregular heart beat and device check. Diagnoses and all orders for this visit:    Ischemic cardiomyopathy  -     Cancel: EKG 12 lead  -     Hepatic Function Panel; Future  -     TSH; Future  -     Ambulatory referral to PFT    Ventricular tachycardia  -     Cancel: EKG 12 lead  -     Hepatic Function Panel; Future  -     TSH; Future  -     Ambulatory referral to PFT    ICD (implantable cardioverter-defibrillator) discharge    HTN (hypertension), benign      ASSESSMENT and PLAN  1. VT: Cont amiodarone to 200mg daily, device adjustments today     2. ICM, CHF, EF 15%, NYHA class III, LBBB with QRS duration ~135-150 msec: s/p BiV ICD upgrade with excellent response, cont OMT     3. ICD: recent upgrade, normal function     4. Amiodarone: referral for PFTs, LFT and TFTs     5.  Apical thrombus: cont eliquis 5mg Q12H    Patient has been instructed and agrees to call our office with any issues or other concerns related to their cardiac condition(s) and/or complaint(s). Return in about 6 months (around 6/1/2023). Carolyne Alcala MD, MS  Cardiology/Electrophysiology  12/1/2022  1:42 PM

## 2022-12-02 LAB
ALBUMIN SERPL-MCNC: 4 G/DL (ref 3.2–4.6)
ALBUMIN/GLOB SERPL: 1.1 {RATIO} (ref 0.4–1.6)
ALP SERPL-CCNC: 127 U/L (ref 50–136)
ALT SERPL-CCNC: 18 U/L (ref 12–65)
AST SERPL-CCNC: 27 U/L (ref 15–37)
BILIRUB DIRECT SERPL-MCNC: 0.3 MG/DL
BILIRUB SERPL-MCNC: 1.1 MG/DL (ref 0.2–1.1)
GLOBULIN SER CALC-MCNC: 3.8 G/DL (ref 2.8–4.5)
PROT SERPL-MCNC: 7.8 G/DL (ref 6.3–8.2)
TSH, 3RD GENERATION: 3.12 UIU/ML (ref 0.36–3.74)

## 2022-12-06 ENCOUNTER — TELEPHONE (OUTPATIENT)
Dept: CARDIOLOGY CLINIC | Age: 77
End: 2022-12-06

## 2022-12-06 NOTE — TELEPHONE ENCOUNTER
Patient called and notified of below orders. Verbalized understanding. MD Mary Trevizo, RN  Caller: Unspecified (Today,  8:25 AM)  Call him and ask him to double amiodarone to 200 mg BID. Will assess in follow up.      Daisy Tanner MD

## 2022-12-06 NOTE — TELEPHONE ENCOUNTER
Oscar alert for ICD shock 12/5 during HS hours, patient was asleep and asymptomatic. Patient seen last week by EP. Hx VT on amio 200 mg QD w/ toprol 25 mg BID.     6 failed ATP therapies and terminated w/ one 30J ICD shock. Has upcoming Dr. Davis Mayer appt. Recent HD admission. ASSESSMENT and PLAN  1. VT: Cont amiodarone to 200mg daily, device adjustments today     2. ICM, CHF, EF 15%, NYHA class III, LBBB with QRS duration ~135-150 msec: s/p BiV ICD upgrade with excellent response, cont OMT     3. ICD: recent upgrade, normal function     4. Amiodarone: referral for PFTs, LFT and TFTs     5. Apical thrombus: cont eliquis 5mg Q12H     Patient has been instructed and agrees to call our office with any issues or other concerns related to their cardiac condition(s) and/or complaint(s). Return in about 6 months (around 6/1/2023). Kashmir Mcguire MD, MS  Cardiology/Electrophysiology  12/1/2022  1:42 PM

## 2022-12-13 ENCOUNTER — OFFICE VISIT (OUTPATIENT)
Dept: CARDIOLOGY CLINIC | Age: 77
End: 2022-12-13
Payer: MEDICARE

## 2022-12-13 VITALS
WEIGHT: 138 LBS | BODY MASS INDEX: 20.44 KG/M2 | DIASTOLIC BLOOD PRESSURE: 78 MMHG | SYSTOLIC BLOOD PRESSURE: 112 MMHG | HEART RATE: 68 BPM | HEIGHT: 69 IN

## 2022-12-13 DIAGNOSIS — I50.22 CHRONIC SYSTOLIC HEART FAILURE (HCC): Primary | ICD-10-CM

## 2022-12-13 PROCEDURE — 1123F ACP DISCUSS/DSCN MKR DOCD: CPT | Performed by: INTERNAL MEDICINE

## 2022-12-13 PROCEDURE — 3074F SYST BP LT 130 MM HG: CPT | Performed by: INTERNAL MEDICINE

## 2022-12-13 PROCEDURE — 3078F DIAST BP <80 MM HG: CPT | Performed by: INTERNAL MEDICINE

## 2022-12-13 PROCEDURE — 99214 OFFICE O/P EST MOD 30 MIN: CPT | Performed by: INTERNAL MEDICINE

## 2022-12-13 RX ORDER — AMIODARONE HYDROCHLORIDE 400 MG/1
400 TABLET ORAL 2 TIMES DAILY
Qty: 14 TABLET | Refills: 0 | Status: ON HOLD | OUTPATIENT
Start: 2022-12-13 | End: 2022-12-16 | Stop reason: HOSPADM

## 2022-12-13 ASSESSMENT — ENCOUNTER SYMPTOMS
PHOTOPHOBIA: 0
CHEST TIGHTNESS: 0
SHORTNESS OF BREATH: 0
BACK PAIN: 0
RESPIRATORY NEGATIVE: 1
GASTROINTESTINAL NEGATIVE: 1
ABDOMINAL PAIN: 0
ALLERGIC/IMMUNOLOGIC NEGATIVE: 1
EYES NEGATIVE: 1
EYE PAIN: 0

## 2022-12-13 NOTE — PROGRESS NOTES
[facility] CARDIOLOGY  [address], [address]  PHONE: [phone]      22    NAME:  Justin Barron  : 1945  MRN: [id number]         SUBJECTIVE:   Justin Barron is a 78 y.o. male seen for follow up of:      Chief Complaint   Patient presents with    Coronary Artery Disease     6 month f/u          Cardiac PMH: (Old records have been reviewed and summarized below)   1. Chronic systolic heart failure (HCC) (ICM)              TTE (): EF 15%              TTE (): EF 20%, possible small apical thrombus              TTE (): EF 15-20% with apical thrombus              TTE (22): EF 20-25% PA pressure is estimated at 42 mmHg with apical thrombus   2. Atherosclerosis of native coronary artery of native heart with stable angina pectoris [facility])              Cath 18 - Occluded LAD, severe OM disease stable by compared to prior with severe LV dysfunction (PA pressure 25 mmHg)   3. HTN (hypertension), benign   4. Arrhythmia              Ventricular tachycardia (HCC)              VT (ventricular tachycardia) (HCC)              AICD (automatic cardioverter/defibrillator) present   5) PHT sees Mr Connor              22 - PA 30/6, PCWP 11 mmHg   6) Lipids              10/31/18 - HDL 55, LDL 93.8, Trig 111   22 - HDL 53, , Trig 166      HPI:  Recently had slow VT and was seen by Dr. Meme Benitez earlier this month his note was reviewed by me. His amiodarone was changed from 200 daily to 200 twice daily. This morning he was awoken by an ICD firing. We will change him to 400 mg twice daily. Past Medical History, Past Surgical History, Family history, Social History, and Medications were all reviewed with the patient today and updated as necessary.        Current Outpatient Medications:     albuterol sulfate HFA (PROVENTIL;VENTOLIN;PROAIR) 108 (90 Base) MCG/ACT inhaler, INHALE 1 TO 2 PUFFS EVERY 4 TO 6 HOURS AS NEEDED, Disp: , Rfl:     acetaminophen (TYLENOL) 500 MG tablet, Take by mouth every 4 hours as needed, Disp: , Rfl:     BREO ELLIPTA 100-25 MCG/ACT AEPB, , Disp: , Rfl:     furosemide (LASIX) 40 MG tablet, TAKE 1 TABLET BY MOUTH EVERY DAY, Disp: , Rfl:     niacin (NIASPAN) 500 MG extended release tablet, TAKE 1 TABLET BY MOUTH EVERY DAY, Disp: , Rfl:     apixaban (ELIQUIS) 5 MG TABS tablet, Take 1 tablet by mouth 2 times daily, Disp: 180 tablet, Rfl: 2    amiodarone (CORDARONE) 200 MG tablet, Take 200 mg by mouth in the morning and 200 mg in the evening., Disp: , Rfl:     enalapril (VASOTEC) 5 MG tablet, Take 5 mg by mouth 2 times daily, Disp: , Rfl:     metoprolol succinate (TOPROL XL) 25 MG extended release tablet, Take 12.5 mg by mouth 2 times daily, Disp: , Rfl:     spironolactone (ALDACTONE) 25 MG tablet, Take 25 mg by mouth daily, Disp: , Rfl:   No Known Allergies  Past Medical History:   Diagnosis Date    AICD (automatic cardioverter/defibrillator) present 2016    Arrhythmia     irregular    Blue toe syndrome of left lower extremity ([facility] [address] 1/10/2018    CAD (coronary artery disease)     stent    Chronic systolic heart failure (HCC)     Coronary atherosclerosis of native coronary artery     GERD (gastroesophageal reflux disease)     Hypertension     Ischemic cardiomyopathy     Non-nicotine vapor product user     Pulmonary edema cardiac cause (HCC)     TIA (transient ischemic attack) 10/15/2019    Tobacco use disorder 2016     Past Surgical History:   Procedure Laterality Date    CARDIAC CATHETERIZATION      2009    OTHER SURGICAL HISTORY      scrotum    PACEMAKER      [place] CARDIAC SURG PROCEDURE UNLIST      6 stents; last one put in 2 years ago 2009     Family History   Problem Relation Age of Onset    Heart Disease Father     Cancer Sister      Social History     Tobacco Use    Smoking status: Some Days     Packs/day: 0.50     Types: Cigarettes     Last attempt to quit: 10/1/2018     Years since quittin.2    Smokeless tobacco: Never    Tobacco comments:     Quit smoking: quit smoking approx. 30 days ago. Substance Use Topics    Alcohol use: Yes     Alcohol/week: 6.0 standard drinks       ROS:  Review of Systems   Constitutional: Negative. Negative for fever. HENT:  Negative for hearing loss, nosebleeds and tinnitus. Eyes: Negative. Negative for photophobia and pain. Respiratory: Negative. Negative for chest tightness and shortness of breath. Cardiovascular: Negative. Negative for chest pain, palpitations and leg swelling. Icd discharge   Gastrointestinal: Negative. Negative for abdominal pain. Endocrine: Negative. Negative for cold intolerance and heat intolerance. Genitourinary: Negative. Negative for dysuria. Musculoskeletal: Negative. Negative for back pain and joint swelling. Skin: Negative. Negative for rash. Allergic/Immunologic: Negative. Negative for immunocompromised state. Neurological: Negative. Negative for dizziness, syncope and light-headedness. Hematological: Negative. Does not bruise/bleed easily. Psychiatric/Behavioral: Negative. Negative for suicidal ideas. PHYSICAL EXAM:  Physical Exam  Constitutional:       General: He is not in acute distress. Appearance: He is not ill-appearing. HENT:      Head: Normocephalic and atraumatic. Nose: No congestion. Mouth/Throat:      Mouth: Mucous membranes are moist.   Eyes:      Extraocular Movements: Extraocular movements intact. Pupils: Pupils are equal, round, and reactive to light. Cardiovascular:      Rate and Rhythm: Normal rate and regular rhythm. Heart sounds: No murmur heard. No friction rub. No gallop. Pulmonary:      Effort: No respiratory distress. Breath sounds: No wheezing or rhonchi. Musculoskeletal:         General: No swelling. Cervical back: Normal range of motion. Right lower leg: No edema. Left lower leg: No edema.    Skin:     General: Skin is warm and dry. Findings: No rash. Neurological:      General: No focal deficit present. Mental Status: He is oriented to person, place, and time. Psychiatric:         Mood and Affect: Mood normal.         Behavior: Behavior normal.         Judgment: Judgment normal.        /78   Pulse 68   Ht 5' 9\" (1.753 m)   Wt 138 lb (62.6 kg)   BMI 20.38 kg/m²      Wt Readings from Last 10 Encounters:   12/13/22 138 lb (62.6 kg)   12/01/22 138 lb 14.4 oz (63 kg)   04/04/22 156 lb (70.8 kg)   02/15/22 148 lb (67.1 kg)   12/08/21 143 lb (64.9 kg)   10/11/21 141 lb (64 kg)   09/08/21 141 lb (64 kg)           Medical problems and test results were reviewed with the patient today. Lab Results   Component Value Date/Time    BUN 19 02/21/2022 08:30 AM     No results found for: CHIARA, CREAPOC, CREA  Lab Results   Component Value Date/Time    K 4.4 02/21/2022 08:30 AM       No results found for: CHOL, CHOLPOCT, CHOLX, CHLST, CHOLV, HDL, HDLPOC, HDLC, LDL, LDLC, VLDLC, VLDL, TGLX, TRIGL    ASSESSMENT and PLAN  No diagnosis found. IMPRESSION:  1) ICM -Vasotec 5 mg twice daily metoprolol 12.5 mg twice daily spironolactone 25 mg daily   2) VT -continue amiodarone 400 mg BID for one week then back to 200 mg twice daily. 3) LV thrombus - continue eliquis 5 mg bid   4) Pulm HTN -complicates his care        ALL ORDERS THIS ENCOUNTER  No orders of the defined types were placed in this encounter. Follow up in 1 months. Thank you for allowing me to participate in this patient's care. Please call or contact me if there are any questions or concerns regarding the above.       Marcia العلي MD  12/13/22  1:23 PM

## 2022-12-14 ENCOUNTER — APPOINTMENT (OUTPATIENT)
Dept: NON INVASIVE DIAGNOSTICS | Age: 77
DRG: 309 | End: 2022-12-14
Payer: MEDICARE

## 2022-12-14 ENCOUNTER — APPOINTMENT (OUTPATIENT)
Dept: GENERAL RADIOLOGY | Age: 77
DRG: 309 | End: 2022-12-14
Payer: MEDICARE

## 2022-12-14 ENCOUNTER — HOSPITAL ENCOUNTER (INPATIENT)
Age: 77
LOS: 1 days | Discharge: HOME | DRG: 309 | End: 2022-12-16
Attending: EMERGENCY MEDICINE | Admitting: INTERNAL MEDICINE
Payer: MEDICARE

## 2022-12-14 DIAGNOSIS — E87.6 HYPOKALEMIA: ICD-10-CM

## 2022-12-14 DIAGNOSIS — Z45.02 AICD DISCHARGE: Primary | ICD-10-CM

## 2022-12-14 DIAGNOSIS — I47.20 VENTRICULAR TACHYCARDIA: ICD-10-CM

## 2022-12-14 LAB
ALBUMIN SERPL-MCNC: 3.1 G/DL (ref 3.2–4.6)
ALBUMIN/GLOB SERPL: 0.8 {RATIO} (ref 0.4–1.6)
ALP SERPL-CCNC: 103 U/L (ref 50–136)
ALT SERPL-CCNC: 11 U/L (ref 12–65)
ANION GAP SERPL CALC-SCNC: 10 MMOL/L (ref 2–11)
AST SERPL-CCNC: 12 U/L (ref 15–37)
BASOPHILS # BLD: 0.1 K/UL (ref 0–0.2)
BASOPHILS NFR BLD: 1 % (ref 0–2)
BILIRUB SERPL-MCNC: 0.6 MG/DL (ref 0.2–1.1)
BUN SERPL-MCNC: 14 MG/DL (ref 8–23)
CALCIUM SERPL-MCNC: 8.8 MG/DL (ref 8.3–10.4)
CHLORIDE SERPL-SCNC: 103 MMOL/L (ref 101–110)
CO2 SERPL-SCNC: 27 MMOL/L (ref 21–32)
CREAT SERPL-MCNC: 2.2 MG/DL (ref 0.8–1.5)
DIFFERENTIAL METHOD BLD: ABNORMAL
ECHO AO ASC DIAM: 3.2 CM
ECHO AO ASCENDING AORTA INDEX: 1.82 CM/M2
ECHO AO ROOT DIAM: 2.8 CM
ECHO AO ROOT INDEX: 1.59 CM/M2
ECHO AV AREA PEAK VELOCITY: 2.2 CM2
ECHO AV AREA VTI: 2.3 CM2
ECHO AV AREA/BSA PEAK VELOCITY: 1.3 CM2/M2
ECHO AV AREA/BSA VTI: 1.3 CM2/M2
ECHO AV MEAN GRADIENT: 5 MMHG
ECHO AV MEAN VELOCITY: 1 M/S
ECHO AV PEAK GRADIENT: 11 MMHG
ECHO AV PEAK VELOCITY: 1.7 M/S
ECHO AV VELOCITY RATIO: 0.65
ECHO AV VTI: 23.7 CM
ECHO BSA: 1.75 M2
ECHO EST RA PRESSURE: 3 MMHG
ECHO IVC PROX: 1.4 CM
ECHO LA AREA 2C: 23.1 CM2
ECHO LA AREA 4C: 20.6 CM2
ECHO LA DIAMETER INDEX: 2.61 CM/M2
ECHO LA DIAMETER: 4.6 CM
ECHO LA MAJOR AXIS: 6.6 CM
ECHO LA MINOR AXIS: 6.1 CM
ECHO LA TO AORTIC ROOT RATIO: 1.64
ECHO LA VOL 2C: 71 ML (ref 18–58)
ECHO LA VOL 4C: 55 ML (ref 18–58)
ECHO LA VOL BP: 65 ML (ref 18–58)
ECHO LA VOL/BSA BIPLANE: 37 ML/M2 (ref 16–34)
ECHO LA VOLUME INDEX A2C: 40 ML/M2 (ref 16–34)
ECHO LA VOLUME INDEX A4C: 31 ML/M2 (ref 16–34)
ECHO LV E' LATERAL VELOCITY: 5 CM/S
ECHO LV E' SEPTAL VELOCITY: 4 CM/S
ECHO LV EDV A2C: 392 ML
ECHO LV EDV A4C: 389 ML
ECHO LV EDV INDEX A4C: 221 ML/M2
ECHO LV EDV NDEX A2C: 223 ML/M2
ECHO LV EJECTION FRACTION A2C: 19 %
ECHO LV EJECTION FRACTION A4C: 19 %
ECHO LV EJECTION FRACTION BIPLANE: 19 % (ref 55–100)
ECHO LV ESV A2C: 316 ML
ECHO LV ESV A4C: 314 ML
ECHO LV ESV INDEX A2C: 180 ML/M2
ECHO LV ESV INDEX A4C: 178 ML/M2
ECHO LV FRACTIONAL SHORTENING: 7 % (ref 28–44)
ECHO LV INTERNAL DIMENSION DIASTOLE INDEX: 4.2 CM/M2
ECHO LV INTERNAL DIMENSION DIASTOLIC: 7.4 CM (ref 4.2–5.9)
ECHO LV INTERNAL DIMENSION SYSTOLIC INDEX: 3.92 CM/M2
ECHO LV INTERNAL DIMENSION SYSTOLIC: 6.9 CM
ECHO LV IVSD: 1.2 CM (ref 0.6–1)
ECHO LV MASS 2D: 422.8 G (ref 88–224)
ECHO LV MASS INDEX 2D: 240.2 G/M2 (ref 49–115)
ECHO LV POSTERIOR WALL DIASTOLIC: 1.1 CM (ref 0.6–1)
ECHO LV RELATIVE WALL THICKNESS RATIO: 0.3
ECHO LVOT AREA: 3.5 CM2
ECHO LVOT AV VTI INDEX: 0.67
ECHO LVOT DIAM: 2.1 CM
ECHO LVOT MEAN GRADIENT: 2 MMHG
ECHO LVOT PEAK GRADIENT: 5 MMHG
ECHO LVOT PEAK VELOCITY: 1.1 M/S
ECHO LVOT STROKE VOLUME INDEX: 31.1 ML/M2
ECHO LVOT SV: 54.7 ML
ECHO LVOT VTI: 15.8 CM
ECHO MV A VELOCITY: 0.79 M/S
ECHO MV E DECELERATION TIME (DT): 304 MS
ECHO MV E VELOCITY: 0.42 M/S
ECHO MV E/A RATIO: 0.53
ECHO MV E/E' LATERAL: 8.4
ECHO MV E/E' RATIO (AVERAGED): 9.45
ECHO MV E/E' SEPTAL: 10.5
ECHO PV ACCELERATION TIME (AT): 103 MS
ECHO PV MAX VELOCITY: 0.8 M/S
ECHO PV PEAK GRADIENT: 2 MMHG
ECHO RIGHT VENTRICULAR SYSTOLIC PRESSURE (RVSP): 28 MMHG
ECHO RV BASAL DIMENSION: 3.4 CM
ECHO RV FREE WALL PEAK S': 9 CM/S
ECHO RV TAPSE: 1.5 CM (ref 1.7–?)
ECHO TV REGURGITANT MAX VELOCITY: 2.52 M/S
ECHO TV REGURGITANT PEAK GRADIENT: 25 MMHG
EKG ATRIAL RATE: 60 BPM
EKG DIAGNOSIS: NORMAL
EKG P-R INTERVAL: 160 MS
EKG Q-T INTERVAL: 524 MS
EKG QRS DURATION: 158 MS
EKG QTC CALCULATION (BAZETT): 524 MS
EKG R AXIS: 268 DEGREES
EKG T AXIS: 81 DEGREES
EKG VENTRICULAR RATE: 60 BPM
EOSINOPHIL # BLD: 0.1 K/UL (ref 0–0.8)
EOSINOPHIL NFR BLD: 1 % (ref 0.5–7.8)
ERYTHROCYTE [DISTWIDTH] IN BLOOD BY AUTOMATED COUNT: 15.5 % (ref 11.9–14.6)
GLOBULIN SER CALC-MCNC: 3.7 G/DL (ref 2.8–4.5)
GLUCOSE SERPL-MCNC: 114 MG/DL (ref 65–100)
HCT VFR BLD AUTO: 41.3 % (ref 41.1–50.3)
HGB BLD-MCNC: 13.3 G/DL (ref 13.6–17.2)
IMM GRANULOCYTES # BLD AUTO: 0 K/UL (ref 0–0.5)
IMM GRANULOCYTES NFR BLD AUTO: 0 % (ref 0–5)
LYMPHOCYTES # BLD: 1.4 K/UL (ref 0.5–4.6)
LYMPHOCYTES NFR BLD: 16 % (ref 13–44)
MAGNESIUM SERPL-MCNC: 2.1 MG/DL (ref 1.8–2.4)
MCH RBC QN AUTO: 29.5 PG (ref 26.1–32.9)
MCHC RBC AUTO-ENTMCNC: 32.2 G/DL (ref 31.4–35)
MCV RBC AUTO: 91.6 FL (ref 82–102)
MONOCYTES # BLD: 0.6 K/UL (ref 0.1–1.3)
MONOCYTES NFR BLD: 7 % (ref 4–12)
NEUTS SEG # BLD: 6.3 K/UL (ref 1.7–8.2)
NEUTS SEG NFR BLD: 75 % (ref 43–78)
NRBC # BLD: 0 K/UL (ref 0–0.2)
NT PRO BNP: 1350 PG/ML
PLATELET # BLD AUTO: 205 K/UL (ref 150–450)
PMV BLD AUTO: 9.5 FL (ref 9.4–12.3)
POTASSIUM SERPL-SCNC: 2.6 MMOL/L (ref 3.5–5.1)
POTASSIUM SERPL-SCNC: 3 MMOL/L (ref 3.5–5.1)
PROT SERPL-MCNC: 6.8 G/DL (ref 6.3–8.2)
RBC # BLD AUTO: 4.51 M/UL (ref 4.23–5.6)
SODIUM SERPL-SCNC: 140 MMOL/L (ref 133–143)
TROPONIN I SERPL HS-MCNC: 75.9 PG/ML (ref 0–14)
TROPONIN I SERPL HS-MCNC: 86.4 PG/ML (ref 0–14)
TSH W FREE THYROID IF ABNORMAL: 3.16 UIU/ML (ref 0.36–3.74)
WBC # BLD AUTO: 8.4 K/UL (ref 4.3–11.1)

## 2022-12-14 PROCEDURE — 6370000000 HC RX 637 (ALT 250 FOR IP): Performed by: PHYSICIAN ASSISTANT

## 2022-12-14 PROCEDURE — 83735 ASSAY OF MAGNESIUM: CPT

## 2022-12-14 PROCEDURE — 96366 THER/PROPH/DIAG IV INF ADDON: CPT

## 2022-12-14 PROCEDURE — 85025 COMPLETE CBC W/AUTO DIFF WBC: CPT

## 2022-12-14 PROCEDURE — A4216 STERILE WATER/SALINE, 10 ML: HCPCS | Performed by: INTERNAL MEDICINE

## 2022-12-14 PROCEDURE — 99285 EMERGENCY DEPT VISIT HI MDM: CPT

## 2022-12-14 PROCEDURE — 2580000003 HC RX 258: Performed by: INTERNAL MEDICINE

## 2022-12-14 PROCEDURE — 96368 THER/DIAG CONCURRENT INF: CPT

## 2022-12-14 PROCEDURE — 93306 TTE W/DOPPLER COMPLETE: CPT | Performed by: INTERNAL MEDICINE

## 2022-12-14 PROCEDURE — 99223 1ST HOSP IP/OBS HIGH 75: CPT | Performed by: INTERNAL MEDICINE

## 2022-12-14 PROCEDURE — 6360000002 HC RX W HCPCS: Performed by: EMERGENCY MEDICINE

## 2022-12-14 PROCEDURE — G0378 HOSPITAL OBSERVATION PER HR: HCPCS

## 2022-12-14 PROCEDURE — 2580000003 HC RX 258: Performed by: PHYSICIAN ASSISTANT

## 2022-12-14 PROCEDURE — 96374 THER/PROPH/DIAG INJ IV PUSH: CPT

## 2022-12-14 PROCEDURE — 2580000003 HC RX 258: Performed by: EMERGENCY MEDICINE

## 2022-12-14 PROCEDURE — 80053 COMPREHEN METABOLIC PANEL: CPT

## 2022-12-14 PROCEDURE — 6360000002 HC RX W HCPCS: Performed by: INTERNAL MEDICINE

## 2022-12-14 PROCEDURE — 6360000004 HC RX CONTRAST MEDICATION: Performed by: INTERNAL MEDICINE

## 2022-12-14 PROCEDURE — 84484 ASSAY OF TROPONIN QUANT: CPT

## 2022-12-14 PROCEDURE — 36415 COLL VENOUS BLD VENIPUNCTURE: CPT

## 2022-12-14 PROCEDURE — 6370000000 HC RX 637 (ALT 250 FOR IP): Performed by: EMERGENCY MEDICINE

## 2022-12-14 PROCEDURE — 84443 ASSAY THYROID STIM HORMONE: CPT

## 2022-12-14 PROCEDURE — C8929 TTE W OR WO FOL WCON,DOPPLER: HCPCS

## 2022-12-14 PROCEDURE — 71045 X-RAY EXAM CHEST 1 VIEW: CPT

## 2022-12-14 PROCEDURE — 6360000002 HC RX W HCPCS: Performed by: NURSE PRACTITIONER

## 2022-12-14 PROCEDURE — 84132 ASSAY OF SERUM POTASSIUM: CPT

## 2022-12-14 PROCEDURE — 96365 THER/PROPH/DIAG IV INF INIT: CPT

## 2022-12-14 PROCEDURE — 83880 ASSAY OF NATRIURETIC PEPTIDE: CPT

## 2022-12-14 RX ORDER — POTASSIUM CHLORIDE 7.45 MG/ML
10 INJECTION INTRAVENOUS ONCE
Status: COMPLETED | OUTPATIENT
Start: 2022-12-14 | End: 2022-12-14

## 2022-12-14 RX ORDER — ACETAMINOPHEN 500 MG
500 TABLET ORAL EVERY 4 HOURS PRN
Status: DISCONTINUED | OUTPATIENT
Start: 2022-12-14 | End: 2022-12-16 | Stop reason: HOSPADM

## 2022-12-14 RX ORDER — SODIUM CHLORIDE 9 MG/ML
INJECTION, SOLUTION INTRAVENOUS PRN
Status: DISCONTINUED | OUTPATIENT
Start: 2022-12-14 | End: 2022-12-16 | Stop reason: HOSPADM

## 2022-12-14 RX ORDER — ALBUTEROL SULFATE 90 UG/1
2 AEROSOL, METERED RESPIRATORY (INHALATION) EVERY 6 HOURS PRN
Status: DISCONTINUED | OUTPATIENT
Start: 2022-12-14 | End: 2022-12-16 | Stop reason: HOSPADM

## 2022-12-14 RX ORDER — POTASSIUM CHLORIDE 20 MEQ/1
40 TABLET, EXTENDED RELEASE ORAL PRN
Status: DISCONTINUED | OUTPATIENT
Start: 2022-12-14 | End: 2022-12-16 | Stop reason: HOSPADM

## 2022-12-14 RX ORDER — SPIRONOLACTONE 25 MG/1
25 TABLET ORAL DAILY
Status: DISCONTINUED | OUTPATIENT
Start: 2022-12-14 | End: 2022-12-16

## 2022-12-14 RX ORDER — SODIUM CHLORIDE 9 MG/ML
INJECTION, SOLUTION INTRAVENOUS CONTINUOUS
Status: DISCONTINUED | OUTPATIENT
Start: 2022-12-14 | End: 2022-12-16 | Stop reason: HOSPADM

## 2022-12-14 RX ORDER — ENALAPRIL MALEATE 5 MG/1
5 TABLET ORAL 2 TIMES DAILY
Status: DISCONTINUED | OUTPATIENT
Start: 2022-12-14 | End: 2022-12-14

## 2022-12-14 RX ORDER — SODIUM CHLORIDE 0.9 % (FLUSH) 0.9 %
5-40 SYRINGE (ML) INJECTION EVERY 12 HOURS SCHEDULED
Status: DISCONTINUED | OUTPATIENT
Start: 2022-12-14 | End: 2022-12-16 | Stop reason: HOSPADM

## 2022-12-14 RX ORDER — FUROSEMIDE 40 MG/1
40 TABLET ORAL DAILY
Status: DISCONTINUED | OUTPATIENT
Start: 2022-12-14 | End: 2022-12-16 | Stop reason: HOSPADM

## 2022-12-14 RX ORDER — LISINOPRIL 5 MG/1
5 TABLET ORAL DAILY
Status: DISCONTINUED | OUTPATIENT
Start: 2022-12-14 | End: 2022-12-16 | Stop reason: HOSPADM

## 2022-12-14 RX ORDER — POTASSIUM CHLORIDE 7.45 MG/ML
10 INJECTION INTRAVENOUS PRN
Status: DISCONTINUED | OUTPATIENT
Start: 2022-12-14 | End: 2022-12-16 | Stop reason: HOSPADM

## 2022-12-14 RX ORDER — NIACIN 500 MG/1
500 TABLET, EXTENDED RELEASE ORAL DAILY
Status: DISCONTINUED | OUTPATIENT
Start: 2022-12-14 | End: 2022-12-16 | Stop reason: HOSPADM

## 2022-12-14 RX ORDER — MAGNESIUM SULFATE IN WATER 40 MG/ML
2000 INJECTION, SOLUTION INTRAVENOUS ONCE
Status: COMPLETED | OUTPATIENT
Start: 2022-12-14 | End: 2022-12-14

## 2022-12-14 RX ORDER — METOPROLOL SUCCINATE 25 MG/1
12.5 TABLET, EXTENDED RELEASE ORAL 2 TIMES DAILY
Status: DISCONTINUED | OUTPATIENT
Start: 2022-12-14 | End: 2022-12-15

## 2022-12-14 RX ORDER — SODIUM CHLORIDE 0.9 % (FLUSH) 0.9 %
5-40 SYRINGE (ML) INJECTION PRN
Status: DISCONTINUED | OUTPATIENT
Start: 2022-12-14 | End: 2022-12-16 | Stop reason: HOSPADM

## 2022-12-14 RX ORDER — MAGNESIUM SULFATE IN WATER 40 MG/ML
2000 INJECTION, SOLUTION INTRAVENOUS PRN
Status: DISCONTINUED | OUTPATIENT
Start: 2022-12-14 | End: 2022-12-16 | Stop reason: HOSPADM

## 2022-12-14 RX ORDER — POLYETHYLENE GLYCOL 3350 17 G/17G
17 POWDER, FOR SOLUTION ORAL DAILY PRN
Status: DISCONTINUED | OUTPATIENT
Start: 2022-12-14 | End: 2022-12-16 | Stop reason: HOSPADM

## 2022-12-14 RX ADMIN — METOPROLOL SUCCINATE 12.5 MG: 25 TABLET, EXTENDED RELEASE ORAL at 20:19

## 2022-12-14 RX ADMIN — AMIODARONE HYDROCHLORIDE 0.5 MG/MIN: 50 INJECTION, SOLUTION INTRAVENOUS at 12:48

## 2022-12-14 RX ADMIN — APIXABAN 5 MG: 5 TABLET, FILM COATED ORAL at 09:58

## 2022-12-14 RX ADMIN — AMIODARONE HYDROCHLORIDE 150 MG: 50 INJECTION, SOLUTION INTRAVENOUS at 08:00

## 2022-12-14 RX ADMIN — SPIRONOLACTONE 25 MG: 25 TABLET ORAL at 09:58

## 2022-12-14 RX ADMIN — NIACIN 500 MG: 500 TABLET, EXTENDED RELEASE ORAL at 10:28

## 2022-12-14 RX ADMIN — APIXABAN 5 MG: 5 TABLET, FILM COATED ORAL at 20:19

## 2022-12-14 RX ADMIN — POTASSIUM CHLORIDE 10 MEQ: 7.46 INJECTION, SOLUTION INTRAVENOUS at 08:00

## 2022-12-14 RX ADMIN — PERFLUTREN 0.15 ML: 6.52 INJECTION, SUSPENSION INTRAVENOUS at 13:38

## 2022-12-14 RX ADMIN — POTASSIUM BICARBONATE 40 MEQ: 782 TABLET, EFFERVESCENT ORAL at 07:59

## 2022-12-14 RX ADMIN — METOPROLOL SUCCINATE 12.5 MG: 25 TABLET, EXTENDED RELEASE ORAL at 09:57

## 2022-12-14 RX ADMIN — MAGNESIUM SULFATE HEPTAHYDRATE 2000 MG: 40 INJECTION, SOLUTION INTRAVENOUS at 15:01

## 2022-12-14 RX ADMIN — SODIUM CHLORIDE: 9 INJECTION, SOLUTION INTRAVENOUS at 09:00

## 2022-12-14 RX ADMIN — SODIUM CHLORIDE: 9 INJECTION, SOLUTION INTRAVENOUS at 21:34

## 2022-12-14 RX ADMIN — SODIUM CHLORIDE, PRESERVATIVE FREE 10 ML: 5 INJECTION INTRAVENOUS at 20:19

## 2022-12-14 ASSESSMENT — ENCOUNTER SYMPTOMS
SHORTNESS OF BREATH: 0
ABDOMINAL PAIN: 0
COUGH: 0
NAUSEA: 0
DIARRHEA: 0
RHINORRHEA: 0
VOMITING: 0

## 2022-12-14 ASSESSMENT — PAIN - FUNCTIONAL ASSESSMENT: PAIN_FUNCTIONAL_ASSESSMENT: 0-10

## 2022-12-14 ASSESSMENT — PAIN SCALES - GENERAL
PAINLEVEL_OUTOF10: 0
PAINLEVEL_OUTOF10: 0

## 2022-12-14 NOTE — H&P
CAD (coronary artery disease)     stent    Chronic systolic heart failure (HCC)     Coronary atherosclerosis of native coronary artery     GERD (gastroesophageal reflux disease)     Hypertension     Ischemic cardiomyopathy     Non-nicotine vapor product user     Pulmonary edema cardiac cause ([facility] [address]     TIA (transient ischemic attack) 10/15/2019    Tobacco use disorder 2016      Past Surgical History:   Procedure Laterality Date    CARDIAC CATHETERIZATION      2009    OTHER SURGICAL HISTORY      scrotum    PACEMAKER      SC CARDIAC SURG PROCEDURE UNLIST      6 stents; last one put in 2 years ago 2009       No Known Allergies   Social History     Socioeconomic History    Marital status:      Spouse name: Not on file    Number of children: Not on file    Years of education: Not on file    Highest education level: Not on file   Occupational History    Not on file   Tobacco Use    Smoking status: Some Days     Packs/day: 0.50     Types: Cigarettes     Last attempt to quit: 10/1/2018     Years since quittin.2    Smokeless tobacco: Never    Tobacco comments:     Quit smoking: quit smoking approx. 30 days ago. Substance and Sexual Activity    Alcohol use:  Yes     Alcohol/week: 6.0 standard drinks    Drug use: No    Sexual activity: Not on file   Other Topics Concern    Not on file   Social History Narrative    Not on file     Social Determinants of Health     Financial Resource Strain: Not on file   Food Insecurity: Not on file   Transportation Needs: Not on file   Physical Activity: Not on file   Stress: Not on file   Social Connections: Not on file   Intimate Partner Violence: Not on file   Housing Stability: Not on file     Family History   Problem Relation Age of Onset    Heart Disease Father     Cancer Sister         Current Facility-Administered Medications   Medication Dose Route Frequency    amiodarone (CORDARONE) 150 mg in dextrose 5 % 100 mL bolus  150 mg IntraVENous Once Followed by    amiodarone (CORDARONE) 450 mg in dextrose 5 % 250 mL infusion ([id number])  1 mg/min IntraVENous Continuous    Followed by    amiodarone (CORDARONE) 450 mg in dextrose 5 % 250 mL infusion ([id number])  0.5 mg/min IntraVENous Continuous     Current Outpatient Medications   Medication Sig    amiodarone (PACERONE) 400 MG tablet Take 1 tablet by mouth 2 times daily for 7 days    albuterol sulfate HFA (PROVENTIL;VENTOLIN;PROAIR) 108 (90 Base) MCG/ACT inhaler INHALE 1 TO 2 PUFFS EVERY 4 TO 6 HOURS AS NEEDED    acetaminophen (TYLENOL) 500 MG tablet Take by mouth every 4 hours as needed    BREO ELLIPTA 100-25 MCG/ACT AEPB     furosemide (LASIX) 40 MG tablet TAKE 1 TABLET BY MOUTH EVERY DAY    niacin (NIASPAN) 500 MG extended release tablet TAKE 1 TABLET BY MOUTH EVERY DAY    apixaban (ELIQUIS) 5 MG TABS tablet Take 1 tablet by mouth 2 times daily    amiodarone (CORDARONE) 200 MG tablet Take 200 mg by mouth in the morning and 200 mg in the evening.     enalapril (VASOTEC) 5 MG tablet Take 5 mg by mouth 2 times daily    metoprolol succinate (TOPROL XL) 25 MG extended release tablet Take 12.5 mg by mouth 2 times daily    spironolactone (ALDACTONE) 25 MG tablet Take 25 mg by mouth daily       Review of symptoms:  General: no recent weight loss/gain, weakness, fatigue, fever or chills   Skin: no rashes, lumps, or other skin changes   HEENT: no headache, dizziness, lightheadedness, vision changes, hearing changes, tinnitus, vertigo, sinus pressure/pain, bleeding gums, sore throat, or hoarseness   Neck: no swollen glands, goiter, pain or stiffness   Respiratory: no cough, sputum, hemoptysis, dyspnea, wheezing   Cardiovascular: +ICD shock, no chest pain or discomfort, palpitations, dyspnea, orthopnea, paroxysmal nocturnal dyspnea, peripheral edema   Gastrointestinal: no trouble swallowing, heartburn, change of appetite, nausea, change in bowel habits, pain with defecation, rectal bleeding or black/tarry stools, hemorrhoids, constipation, diarrhea, abdominal pain, jaundice, liver or gallbladder problems   Urinary: no frequency, urgency , hematuria, burning/pain with urination, recent flank pain, polyuria, nocturia, or difficulty urinating   Peripheral Vascular: no claudication, leg cramps, prior DVTs, swelling of calves, legs, or feet, color change, or swelling with redness or tenderness   Musculoskeletal: no muscle or joint pain/stiffness, joint swelling, erythema of joints, or back pain   Psychiatric: no depression, mental disorders, or excessive stress   Neurological: +history of TIA, dizziness, no sensory or motor loss, seizures, syncope, tremors, numbness, tingling, no changes in mood, attention, or speech, no changes in orientation, memory, insight, or judgment. no headache, vertigo.    Hematologic: no anemia, easy bruising or bleeding   Endocrine: no diabetes, thyroid problems, heat or cold intolerance, excessive sweating, polyuria, polydipsia      Subjective:   Physical Exam  Vitals:    12/14/22 0531   BP: 97/72   Pulse: 62   Resp: 20   Temp: 97.3 °F (36.3 °C)   TempSrc: Oral   SpO2: 100%   Weight: 138 lb (62.6 kg)   Height: 5' 9\" (1.753 m)      GEN: A&O x 3, no acute distress  HEENT: NCAT, PERRL, no JVD or carotid bruits  CV: S1S2 RRR  Chest: CTA B, no wheezing, rales or rhonchi  Abd: soft, +BS, non tender  Ext: no LE edema, cyanosis or clubbing    Labs:   Recent Results (from the past 24 hour(s))   EKG 12 Lead    Collection Time: 12/14/22  5:20 AM   Result Value Ref Range    Ventricular Rate 60 BPM    Atrial Rate 60 BPM    P-R Interval 160 ms    QRS Duration 158 ms    Q-T Interval 524 ms    QTc Calculation (Bazett) 524 ms    R Axis 268 degrees    T Axis 81 degrees    Diagnosis AV dual-paced rhythm    CMP    Collection Time: 12/14/22  5:56 AM   Result Value Ref Range    Sodium 140 133 - 143 mmol/L    Potassium 2.6 (L) 3.5 - 5.1 mmol/L    Chloride 103 101 - 110 mmol/L    CO2 27 21 - 32 mmol/L    Anion Gap 10 2 - 11 mmol/L    Glucose 114 (H) 65 - 100 mg/dL    BUN 14 8 - 23 MG/DL    Creatinine 2.20 (H) 0.8 - 1.5 MG/DL    Est, Glom Filt Rate 30 (L) >60 ml/min/1.73m2    Calcium 8.8 8.3 - 10.4 MG/DL    Total Bilirubin 0.6 0.2 - 1.1 MG/DL    ALT 11 (L) 12 - 65 U/L    AST 12 (L) 15 - 37 U/L    Alk Phosphatase 103 50 - 136 U/L    Total Protein 6.8 6.3 - 8.2 g/dL    Albumin 3.1 (L) 3.2 - 4.6 g/dL    Globulin 3.7 2.8 - 4.5 g/dL    Albumin/Globulin Ratio 0.8 0.4 - 1.6     CBC with Auto Differential    Collection Time: 12/14/22  5:56 AM   Result Value Ref Range    WBC 8.4 4.3 - 11.1 K/uL    RBC 4.51 4.23 - 5.6 M/uL    Hemoglobin 13.3 (L) 13.6 - 17.2 g/dL    Hematocrit 41.3 41.1 - 50.3 %    MCV 91.6 82 - 102 FL    MCH 29.5 26.1 - 32.9 PG    MCHC 32.2 31.4 - 35.0 g/dL    RDW 15.5 (H) 11.9 - 14.6 %    Platelets 605 758 - 291 K/uL    MPV 9.5 9.4 - 12.3 FL    nRBC 0.00 0.0 - 0.2 K/uL    Differential Type AUTOMATED      Seg Neutrophils 75 43 - 78 %    Lymphocytes 16 13 - 44 %    Monocytes 7 4.0 - 12.0 %    Eosinophils % 1 0.5 - 7.8 %    Basophils 1 0.0 - 2.0 %    Immature Granulocytes 0 0.0 - 5.0 %    Segs Absolute 6.3 1.7 - 8.2 K/UL    Absolute Lymph # 1.4 0.5 - 4.6 K/UL    Absolute Mono # 0.6 0.1 - 1.3 K/UL    Absolute Eos # 0.1 0.0 - 0.8 K/UL    Basophils Absolute 0.1 0.0 - 0.2 K/UL    Absolute Immature Granulocyte 0.0 0.0 - 0.5 K/UL   Magnesium    Collection Time: 12/14/22  5:56 AM   Result Value Ref Range    Magnesium 2.1 1.8 - 2.4 mg/dL   Troponin    Collection Time: 12/14/22  5:56 AM   Result Value Ref Range    Troponin, High Sensitivity 75.9 (H) 0 - 14 pg/mL       Pt has been seen and examined by Dr. Anjel Gan and he agrees with the following assessment and plan:     Assessment/Plan:         Patient Active Problem List    Diagnosis    ICD (implantable cardioverter-defibrillator) discharge- VT on interrogation- admit on IV Amiodarone bolus and drip, monitor    VT (ventricular tachycardia)- as above- IV Amio, BB    Left ventricular apical thrombus- continue Eliquis    HFrEF/ICM EF 20-25% (last echo 2/21/22)- continue Toprol XL, Aldactone, appears euvolemic- hold Vasotec and Lasix    Hypokalemia- 2.6- replacement ordered by ER MD- replace, recheck    SHUKRI with Cr. 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

## 2022-12-14 NOTE — ED TRIAGE NOTES
Pt arrives via [facility]. Pt states that ICD went off this AM. States was laying on the couch, became dizzy, sat up and ICD fired. Pt states that this is the second occurrence. States went off yesterday morning with same symptoms. Had appointment with [facility] Resendiz yesterday. Dr. Amna Everett changed amiodarone from 200mg twice a day to 400 mg twice a day. Pt currently asymptomatic. Denies any CP SHOB N/V/D or dizziness.

## 2022-12-14 NOTE — PROGRESS NOTES
Patient is coming up from the ED. His potassium is 2.6 and he only got one bag of replacement this morning at 8am and he does not have a order for any PRN replacement. Nahum Santos NP notified.

## 2022-12-14 NOTE — ED PROVIDER NOTES
Emergency Department Provider Note                   PCP:                Ever Rodriguez MD               Age: 78 y.o. Sex: male       ICD-10-CM    1. AICD discharge  Z45.02       2. Ventricular tachycardia  I47.20       3. Hypokalemia  E87.6           DISPOSITION Decision To Admit 12/14/2022 06:30:08 AM        MDM  Number of Diagnoses or Management Options  AICD discharge: new, needed workup  Hypokalemia: new, no workup  Ventricular tachycardia: new, needed workup  Diagnosis management comments: [facility] [address] [facility] Reports two thirds of VT with rate in the 160s. Patient was shocked at midnight and around 4 AM.  Cardiology consulted. Discussed EKG, plan/management with Dr. Janet Oro.  Patient with no chest pain, shortness of breath. IV Amiodarone bolus and infusion ordered. Cardiology to present to bedside for evaluation. Basic labs pending.  ===========================  K noted to be 2.6. IV K + po supplementation ordered. Amount and/or Complexity of Data Reviewed  Clinical lab tests: ordered and reviewed  Tests in the radiology section of CPT®: ordered and reviewed  Tests in the medicine section of CPT®: ordered and reviewed  Review and summarize past medical records: yes  Independent visualization of images, tracings, or specimens: yes    Risk of Complications, Morbidity, and/or Mortality  Presenting problems: high  Diagnostic procedures: high  Management options: high  General comments: Discussed my concerns with Dr. Janet Oro in regards to EKG changes. Paced rhythm.     Critical Care  Total time providing critical care: 30-74 minutes    Patient Progress  Patient progress: other (comment) (Guarded)             Orders Placed This Encounter   Procedures    Critical Care    CMP    CBC with Auto Differential    Magnesium    Troponin    Brain Natriuretic Peptide    Troponin    Misc nursing order (specify)    EKG 12 Lead    Saline lock IV        Medications amiodarone (CORDARONE) 150 mg in dextrose 5 % 100 mL bolus (has no administration in time range)     Followed by   amiodarone (CORDARONE) 450 mg in dextrose 5 % 250 mL infusion ([id number]) (has no administration in time range)     Followed by   amiodarone (CORDARONE) 450 mg in dextrose 5 % 250 mL infusion ([id number]) (has no administration in time range)   potassium chloride 10 mEq/100 mL IVPB (Peripheral Line) (has no administration in time range)   potassium bicarb-citric acid (EFFER-K) effervescent tablet 40 mEq (has no administration in time range)       New Prescriptions    No medications on file        Deajohnna Dean is a 78 y.o. male who presents to the Emergency Department with chief complaint of AICD issue. Chief Complaint   Patient presents with    AICD Problem      79-year-old male with history of ischemic cardiomyopathy, VT, pulmonary hypertension, CHF, LV thrombus on Eliquis who presents via [facility] with complaint of \"my ICD fired this morning around 4:30 am\". States that he was laying on the couch watching TV and became slightly dizzy. States that when he sat up he felt his defibrillator fire. States that this is the second occurrence this is happened. States that he went off yesterday morning with same symptoms. Patient had an appointment with Dr. Stephen Jamil on yesterday. Dr. Stephen Jamil changed his amiodarone from 200 mg twice a day to 400 mg twice a day. Patient is currently asymptomatic. Denies chest pain, shortness of breath, nausea, vomiting, diarrhea, dizziness, weakness, headache. Patient with no other complaints this time. Denies any alleviating or exacerbating factors. The history is provided by the patient. No  was used. Review of Systems   Constitutional:  Negative for chills, fatigue and fever. HENT:  Negative for congestion and rhinorrhea. Eyes:  Negative for visual disturbance. Respiratory:  Negative for cough and shortness of breath. Cardiovascular:  Negative for chest pain and palpitations. Gastrointestinal:  Negative for abdominal pain, diarrhea, nausea and vomiting. Genitourinary:  Negative for dysuria and flank pain. Musculoskeletal:  Negative for arthralgias and myalgias. Skin:  Negative for rash. Neurological:  Positive for light-headedness. Negative for seizures, speech difficulty, weakness, numbness and headaches. Hematological:  Does not bruise/bleed easily. Psychiatric/Behavioral:  Negative for confusion. Past Medical History:   Diagnosis Date    AICD (automatic cardioverter/defibrillator) present 2016    Arrhythmia     irregular    Blue toe syndrome of left lower extremity ([facility] [address] 1/10/2018    CAD (coronary artery disease)     stent    Chronic systolic heart failure (HCC)     Coronary atherosclerosis of native coronary artery     GERD (gastroesophageal reflux disease)     Hypertension     Ischemic cardiomyopathy     Non-nicotine vapor product user     Pulmonary edema cardiac cause (HCC)     TIA (transient ischemic attack) 10/15/2019    Tobacco use disorder 2016        Past Surgical History:   Procedure Laterality Date    CARDIAC CATHETERIZATION      2009    OTHER SURGICAL HISTORY      scrotum    PACEMAKER      [place] CARDIAC SURG PROCEDURE UNLIST      6 stents; last one put in 2 years ago 2009        Family History   Problem Relation Age of Onset    Heart Disease Father     Cancer Sister         Social History     Socioeconomic History    Marital status:    Tobacco Use    Smoking status: Some Days     Packs/day: 0.50     Types: Cigarettes     Last attempt to quit: 10/1/2018     Years since quittin.2    Smokeless tobacco: Never    Tobacco comments:     Quit smoking: quit smoking approx. 30 days ago. Substance and Sexual Activity    Alcohol use: Yes     Alcohol/week: 6.0 standard drinks    Drug use: No         Patient has no known allergies.      Previous Medications ACETAMINOPHEN (TYLENOL) 500 MG TABLET    Take by mouth every 4 hours as needed    ALBUTEROL SULFATE HFA (PROVENTIL;VENTOLIN;PROAIR) 108 (90 BASE) MCG/ACT INHALER    INHALE 1 TO 2 PUFFS EVERY 4 TO 6 HOURS AS NEEDED    AMIODARONE (CORDARONE) 200 MG TABLET    Take 200 mg by mouth in the morning and 200 mg in the evening. AMIODARONE (PACERONE) 400 MG TABLET    Take 1 tablet by mouth 2 times daily for 7 days    APIXABAN (ELIQUIS) 5 MG TABS TABLET    Take 1 tablet by mouth 2 times daily    BREO ELLIPTA 100-25 MCG/ACT AEPB        ENALAPRIL (VASOTEC) 5 MG TABLET    Take 5 mg by mouth 2 times daily    FUROSEMIDE (LASIX) 40 MG TABLET    TAKE 1 TABLET BY MOUTH EVERY DAY    METOPROLOL SUCCINATE (TOPROL XL) 25 MG EXTENDED RELEASE TABLET    Take 12.5 mg by mouth 2 times daily    NIACIN (NIASPAN) 500 MG EXTENDED RELEASE TABLET    TAKE 1 TABLET BY MOUTH EVERY DAY    SPIRONOLACTONE (ALDACTONE) 25 MG TABLET    Take 25 mg by mouth daily        Vitals signs and nursing note reviewed. Patient Vitals for the past 4 hrs:   Temp Pulse Resp BP SpO2   12/14/22 0531 97.3 °F (36.3 °C) 62 20 97/72 100 %          Physical Exam  Vitals and nursing note reviewed. Constitutional:       Appearance: Normal appearance. Comments: Patient sitting up on stretcher in no acute distress. HENT:      Head: Normocephalic. Nose: Nose normal.      Mouth/Throat:      Mouth: Mucous membranes are moist.      Pharynx: No oropharyngeal exudate. Eyes:      Extraocular Movements: Extraocular movements intact. Conjunctiva/sclera: Conjunctivae normal.      Pupils: Pupils are equal, round, and reactive to light. Cardiovascular:      Rate and Rhythm: Normal rate. Pulses: Normal pulses. Heart sounds: Normal heart sounds. Pulmonary:      Effort: Pulmonary effort is normal.      Breath sounds: Normal breath sounds. Comments: CTAB. Abdominal:      Palpations: Abdomen is soft. Tenderness: There is no abdominal tenderness. There is no guarding or rebound. Comments: Soft, nontender, nondistended. No rebound or guarding. Musculoskeletal:         General: No deformity. Normal range of motion. Cervical back: Normal range of motion. No rigidity. Right lower leg: No edema. Left lower leg: No edema. Comments: No LE edema. No calf TTP. Skin:     General: Skin is warm. Findings: No rash. Neurological:      General: No focal deficit present. Mental Status: He is alert and oriented to person, place, and time. Cranial Nerves: No cranial nerve deficit. Sensory: No sensory deficit. Motor: No weakness.    Psychiatric:         Mood and Affect: Mood normal.         Behavior: Behavior normal.        EKG 12 Lead    Date/Time: 12/14/2022 5:55 AM  Performed by: Laury Lacey MD  Authorized by: Laury Lacey MD     ECG reviewed by ED Physician in the absence of a cardiologist: yes    Previous ECG:     Previous ECG:  Compared to current    Similarity:  Changes noted  Rate:     ECG rate:  60    ECG rate assessment: normal    Rhythm:     Rhythm: sinus rhythm and paced    ST segments:     ST segments:  Non-specific  T waves:     T waves: normal    Critical Care  Performed by: Laury Lacey MD  Authorized by: Laury Lacey MD     Critical care provider statement:     Critical care time (minutes):  30    Critical care time was exclusive of:  Separately billable procedures and treating other patients    Critical care was necessary to treat or prevent imminent or life-threatening deterioration of the following conditions:  Cardiac failure and circulatory failure    Critical care was time spent personally by me on the following activities:  Blood draw for specimens, development of treatment plan with patient or surrogate, discussions with consultants, discussions with primary provider, evaluation of patient's response to treatment, obtaining history from patient or surrogate, review of old charts, re-evaluation of patient's condition, pulse oximetry, ordering and review of radiographic studies, ordering and review of laboratory studies and ordering and performing treatments and interventions    I assumed direction of critical care for this patient from another provider in my specialty: no      Care discussed with: admitting provider      Results for orders placed or performed during the hospital encounter of 12/14/22   Critical Care    Narrative    Cristopher Alfaro MD     12/14/2022  6:36 AM  Critical Care  Performed by: Cristopher Alfaro MD  Authorized by: Cristopher Alfaro MD     Critical care provider statement:     Critical care time (minutes):  30    Critical care time was exclusive of:  Separately billable procedures and   treating other patients    Critical care was necessary to treat or prevent imminent or   life-threatening deterioration of the following conditions:  Cardiac   failure and circulatory failure    Critical care was time spent personally by me on the following   activities:  Blood draw for specimens, development of treatment plan with   patient or surrogate, discussions with consultants, discussions with   primary provider, evaluation of patient's response to treatment, obtaining   history from patient or surrogate, review of old charts, re-evaluation of   patient's condition, pulse oximetry, ordering and review of radiographic   studies, ordering and review of laboratory studies and ordering and   performing treatments and interventions    I assumed direction of critical care for this patient from another   provider in my specialty: no      Care discussed with: admitting provider     CMP   Result Value Ref Range    Sodium 140 133 - 143 mmol/L    Potassium 2.6 (L) 3.5 - 5.1 mmol/L    Chloride 103 101 - 110 mmol/L    CO2 27 21 - 32 mmol/L    Anion Gap 10 2 - 11 mmol/L    Glucose 114 (H) 65 - 100 mg/dL    BUN 14 8 - 23 MG/DL Creatinine 2.20 (H) 0.8 - 1.5 MG/DL    Est, Glom Filt Rate 30 (L) >60 ml/min/1.73m2    Calcium 8.8 8.3 - 10.4 MG/DL    Total Bilirubin 0.6 0.2 - 1.1 MG/DL    ALT 11 (L) 12 - 65 U/L    AST 12 (L) 15 - 37 U/L    Alk Phosphatase 103 50 - 136 U/L    Total Protein 6.8 6.3 - 8.2 g/dL    Albumin 3.1 (L) 3.2 - 4.6 g/dL    Globulin 3.7 2.8 - 4.5 g/dL    Albumin/Globulin Ratio 0.8 0.4 - 1.6     CBC with Auto Differential   Result Value Ref Range    WBC 8.4 4.3 - 11.1 K/uL    RBC 4.51 4.23 - 5.6 M/uL    Hemoglobin 13.3 (L) 13.6 - 17.2 g/dL    Hematocrit 41.3 41.1 - 50.3 %    MCV 91.6 82 - 102 FL    MCH 29.5 26.1 - 32.9 PG    MCHC 32.2 31.4 - 35.0 g/dL    RDW 15.5 (H) 11.9 - 14.6 %    Platelets 974 355 - 483 K/uL    MPV 9.5 9.4 - 12.3 FL    nRBC 0.00 0.0 - 0.2 K/uL    Differential Type AUTOMATED      Seg Neutrophils 75 43 - 78 %    Lymphocytes 16 13 - 44 %    Monocytes 7 4.0 - 12.0 %    Eosinophils % 1 0.5 - 7.8 %    Basophils 1 0.0 - 2.0 %    Immature Granulocytes 0 0.0 - 5.0 %    Segs Absolute 6.3 1.7 - 8.2 K/UL    Absolute Lymph # 1.4 0.5 - 4.6 K/UL    Absolute Mono # 0.6 0.1 - 1.3 K/UL    Absolute Eos # 0.1 0.0 - 0.8 K/UL    Basophils Absolute 0.1 0.0 - 0.2 K/UL    Absolute Immature Granulocyte 0.0 0.0 - 0.5 K/UL   Magnesium   Result Value Ref Range    Magnesium 2.1 1.8 - 2.4 mg/dL   Troponin   Result Value Ref Range    Troponin, High Sensitivity 75.9 (H) 0 - 14 pg/mL   EKG 12 Lead   Result Value Ref Range    Ventricular Rate 60 BPM    Atrial Rate 60 BPM    P-R Interval 160 ms    QRS Duration 158 ms    Q-T Interval 524 ms    QTc Calculation (Bazett) 524 ms    R Axis 268 degrees    T Axis 81 degrees    Diagnosis AV dual-paced rhythm     Jazmine Linton MD     12/14/2022  6:36 AM  EKG 12 Lead    Date/Time: 12/14/2022 5:55 AM  Performed by: Merlinda Muslim., MD  Authorized by: Merlinda Muslim., MD     ECG reviewed by ED Physician in the absence of a cardiologist: yes    Previous ECG: Previous ECG:  Compared to current    Similarity:  Changes noted  Rate:     ECG rate:  60    ECG rate assessment: normal    Rhythm:     Rhythm: sinus rhythm and paced    ST segments:     ST segments:  Non-specific  T waves:     T waves: normal          No orders to display                       Voice dictation software was used during the making of this note. This software is not perfect and grammatical and other typographical errors may be present. This note has not been completely proofread for errors.      An Lerner MD  12/14/22 2736       Ulises Ashby MD  12/14/22 5293

## 2022-12-14 NOTE — PROGRESS NOTES
TRANSFER - IN REPORT:    Verbal report received from JESSY Parker on Chris Lanius being received from ER - [facility] C for routine progression of patient care      Report consisted of patients Situation, Background, Assessment and Recommendations(SBAR). Information from the following report(s) SBAR, ED Summary, Intake/Output, and MAR was reviewed with the receiving nurse. Opportunity for questions and clarification was provided. Assessment completed upon patients arrival to unit and care assumed.

## 2022-12-14 NOTE — ED NOTES
Report given to JESSY Parker. Care of patient transferred at this time.      Shimon Shaffer RN  12/14/22 1414

## 2022-12-15 LAB
ANION GAP SERPL CALC-SCNC: 4 MMOL/L (ref 2–11)
BUN SERPL-MCNC: 11 MG/DL (ref 8–23)
CALCIUM SERPL-MCNC: 9 MG/DL (ref 8.3–10.4)
CHLORIDE SERPL-SCNC: 103 MMOL/L (ref 101–110)
CO2 SERPL-SCNC: 29 MMOL/L (ref 21–32)
CREAT SERPL-MCNC: 1.7 MG/DL (ref 0.8–1.5)
ERYTHROCYTE [DISTWIDTH] IN BLOOD BY AUTOMATED COUNT: 15.4 % (ref 11.9–14.6)
GLUCOSE SERPL-MCNC: 102 MG/DL (ref 65–100)
HCT VFR BLD AUTO: 38.2 % (ref 41.1–50.3)
HGB BLD-MCNC: 12.4 G/DL (ref 13.6–17.2)
MAGNESIUM SERPL-MCNC: 2.7 MG/DL (ref 1.8–2.4)
MCH RBC QN AUTO: 29.6 PG (ref 26.1–32.9)
MCHC RBC AUTO-ENTMCNC: 32.5 G/DL (ref 31.4–35)
MCV RBC AUTO: 91.2 FL (ref 82–102)
NRBC # BLD: 0 K/UL (ref 0–0.2)
PLATELET # BLD AUTO: 182 K/UL (ref 150–450)
PMV BLD AUTO: 9.1 FL (ref 9.4–12.3)
POTASSIUM SERPL-SCNC: 3.2 MMOL/L (ref 3.5–5.1)
RBC # BLD AUTO: 4.19 M/UL (ref 4.23–5.6)
SODIUM SERPL-SCNC: 136 MMOL/L (ref 133–143)
WBC # BLD AUTO: 7.6 K/UL (ref 4.3–11.1)

## 2022-12-15 PROCEDURE — 6370000000 HC RX 637 (ALT 250 FOR IP): Performed by: PHYSICIAN ASSISTANT

## 2022-12-15 PROCEDURE — 36415 COLL VENOUS BLD VENIPUNCTURE: CPT

## 2022-12-15 PROCEDURE — G0378 HOSPITAL OBSERVATION PER HR: HCPCS

## 2022-12-15 PROCEDURE — 6360000002 HC RX W HCPCS: Performed by: NURSE PRACTITIONER

## 2022-12-15 PROCEDURE — 96375 TX/PRO/DX INJ NEW DRUG ADDON: CPT

## 2022-12-15 PROCEDURE — 6370000000 HC RX 637 (ALT 250 FOR IP): Performed by: NURSE PRACTITIONER

## 2022-12-15 PROCEDURE — 83735 ASSAY OF MAGNESIUM: CPT

## 2022-12-15 PROCEDURE — 6370000000 HC RX 637 (ALT 250 FOR IP): Performed by: INTERNAL MEDICINE

## 2022-12-15 PROCEDURE — 85027 COMPLETE CBC AUTOMATED: CPT

## 2022-12-15 PROCEDURE — 99233 SBSQ HOSP IP/OBS HIGH 50: CPT | Performed by: INTERNAL MEDICINE

## 2022-12-15 PROCEDURE — 80048 BASIC METABOLIC PNL TOTAL CA: CPT

## 2022-12-15 PROCEDURE — 94640 AIRWAY INHALATION TREATMENT: CPT

## 2022-12-15 PROCEDURE — 6360000002 HC RX W HCPCS: Performed by: EMERGENCY MEDICINE

## 2022-12-15 PROCEDURE — 2580000003 HC RX 258: Performed by: EMERGENCY MEDICINE

## 2022-12-15 PROCEDURE — 96366 THER/PROPH/DIAG IV INF ADDON: CPT

## 2022-12-15 PROCEDURE — 2580000003 HC RX 258: Performed by: PHYSICIAN ASSISTANT

## 2022-12-15 PROCEDURE — 94760 N-INVAS EAR/PLS OXIMETRY 1: CPT

## 2022-12-15 RX ORDER — METOPROLOL SUCCINATE 25 MG/1
25 TABLET, EXTENDED RELEASE ORAL 2 TIMES DAILY
Status: DISCONTINUED | OUTPATIENT
Start: 2022-12-15 | End: 2022-12-16

## 2022-12-15 RX ORDER — HYDRALAZINE HYDROCHLORIDE 20 MG/ML
10 INJECTION INTRAMUSCULAR; INTRAVENOUS EVERY 6 HOURS PRN
Status: DISCONTINUED | OUTPATIENT
Start: 2022-12-15 | End: 2022-12-16 | Stop reason: HOSPADM

## 2022-12-15 RX ORDER — POTASSIUM CHLORIDE 20 MEQ/1
40 TABLET, EXTENDED RELEASE ORAL EVERY 4 HOURS
Status: COMPLETED | OUTPATIENT
Start: 2022-12-15 | End: 2022-12-15

## 2022-12-15 RX ADMIN — HYDRALAZINE HYDROCHLORIDE 10 MG: 20 INJECTION INTRAMUSCULAR; INTRAVENOUS at 18:03

## 2022-12-15 RX ADMIN — NIACIN 500 MG: 500 TABLET, EXTENDED RELEASE ORAL at 08:13

## 2022-12-15 RX ADMIN — POTASSIUM CHLORIDE 40 MEQ: 1500 TABLET, EXTENDED RELEASE ORAL at 08:12

## 2022-12-15 RX ADMIN — METOPROLOL SUCCINATE 25 MG: 25 TABLET, EXTENDED RELEASE ORAL at 20:32

## 2022-12-15 RX ADMIN — SPIRONOLACTONE 25 MG: 25 TABLET ORAL at 08:12

## 2022-12-15 RX ADMIN — METOPROLOL SUCCINATE 25 MG: 25 TABLET, EXTENDED RELEASE ORAL at 08:13

## 2022-12-15 RX ADMIN — SODIUM CHLORIDE, PRESERVATIVE FREE 10 ML: 5 INJECTION INTRAVENOUS at 08:11

## 2022-12-15 RX ADMIN — AMIODARONE HYDROCHLORIDE 0.5 MG/MIN: 50 INJECTION, SOLUTION INTRAVENOUS at 17:44

## 2022-12-15 RX ADMIN — APIXABAN 5 MG: 5 TABLET, FILM COATED ORAL at 20:32

## 2022-12-15 RX ADMIN — AMIODARONE HYDROCHLORIDE 0.5 MG/MIN: 50 INJECTION, SOLUTION INTRAVENOUS at 00:43

## 2022-12-15 RX ADMIN — POTASSIUM CHLORIDE 40 MEQ: 1500 TABLET, EXTENDED RELEASE ORAL at 11:15

## 2022-12-15 RX ADMIN — SODIUM CHLORIDE, PRESERVATIVE FREE 10 ML: 5 INJECTION INTRAVENOUS at 20:32

## 2022-12-15 RX ADMIN — ALBUTEROL SULFATE 2 PUFF: 90 AEROSOL, METERED RESPIRATORY (INHALATION) at 22:38

## 2022-12-15 RX ADMIN — POTASSIUM CHLORIDE 40 MEQ: 1500 TABLET, EXTENDED RELEASE ORAL at 05:52

## 2022-12-15 RX ADMIN — APIXABAN 5 MG: 5 TABLET, FILM COATED ORAL at 08:11

## 2022-12-15 ASSESSMENT — PAIN SCALES - GENERAL
PAINLEVEL_OUTOF10: 0

## 2022-12-15 NOTE — PROGRESS NOTES
Patient's potassium resulted at 3.0. Notified Jamin Mitchell NP, and received orders for PRN Potassium replacement. Will give IV potassium per protocol. 3470 Patient not tolerating IV Potassium. Tried to dilute with normal saline and turn the rate down to 50 mL/hour.  Notified Jamin Mitchell NP.

## 2022-12-15 NOTE — PLAN OF CARE
Problem: Discharge Planning  Goal: Discharge to home or other facility with appropriate resources  Outcome: Progressing     Problem: Safety - Adult  Goal: Free from fall injury  Outcome: Progressing     Problem: ABCDS Injury Assessment  Goal: Absence of physical injury  Outcome: Progressing     Problem: Cardiovascular - Adult  Goal: Maintains optimal cardiac output and hemodynamic stability  Outcome: Progressing  Goal: Absence of cardiac dysrhythmias or at baseline  Outcome: Progressing

## 2022-12-15 NOTE — PROGRESS NOTES
Patient's skin is clean, dry, and intact. Heels and sacrum are without any redness or pressure injuries. Noted L sided scar on chest. Dual skin assessment completed with Larisa Barkley RN.

## 2022-12-15 NOTE — PROGRESS NOTES
[facility] CARDIOLOGY PROGRESS NOTE           12/15/2022 7:18 AM    Admit Date: 12/14/2022    Admit Diagnosis: Hypokalemia [E87.6]  Ventricular tachycardia [I47.20]  AICD discharge [Z45.02]    Assessment:   VT Storm   Atherosclerosis of native coronary artery of native heart with stable angina pectoris (HCC)  Ischemic cardiomyopathy, EF 20-25%, NYHA class IIIb Heart Failure  Chronic obstructive CAD  S/p ICD with CRT-D upgrade 2/2021 (Dr. Dmitriy Pappas)   CKD  Hypokalemia    Plan:   Mr. Jacqueline Solis is a 78year old male with advanced CAD with a history of PCIx6 who was admitted with recurrent VT. He previously went into VT storm in 2018 when he received nearly 40 shocks from his device. I agree with intravenous amiodarone. If he continues to have ectopy/sustained arrhythmia, low threshold for IV lidocaine vs oral mexiletine.    -Continue IV amiodarone for at least another 24 hours and transition to po tomorrow.   -If goes into sustained VT, get 12 lead ECG if able. -Keep magnet in patient room.   -Aggressive HF mgmt as tolerated. -Careful with renal function.   -Ensure K>4.0 and Mg >/= 2.5 (higher range due to VT storm). LOW K LIKELY PRECIPITATED VT. STILL LOW. -Maximize beta blockers. -Device interrogation today. -SHUKRI on CKD - improved overnight.   -If recurrent VT storm, plan to intubate and start benzos. -Consider VT ablation if VT is refractory to medical therapy based on [facility] VT and [facility] trial evidence.   -Consider outpt advanced heart failure referral.     Thank you for allowing me to participate in the electrophysiologic care of this most pleasant patient. Please feel free to contact me if there are any questions or concerns. Sadie Correa. Debra Olivares MD, MS  Clinical Cardiac Electrophysiology  [facility]    Subjective:   No complaints this AM, no chest pain or shortness of breath. No further ICD shocks.      Interval History: (History of pertinent interval events obtained from nursing staff)    ROS:  GEN:  No fever or chills  Cardiovascular:  As noted above  Pulmonary:  As noted above  Neuro:  No new focal motor or sensory loss      Objective:     Vitals:    12/14/22 1942 12/14/22 2019 12/15/22 0109 12/15/22 0546   BP: 108/70 108/70 128/81 121/81   Pulse: 61  59 62   Resp: 20  20 20   Temp: 97.7 °F (36.5 °C)  97.3 °F (36.3 °C) 97.3 °F (36.3 °C)   TempSrc: Oral  Oral Oral   SpO2: 98%  97% 96%   Weight:    138 lb 0.1 oz (62.6 kg)   Height:           Physical Exam:  General-Well Developed, Well Nourished, No Acute Distress, Alert & Oriented x 3, appropriate mood. Neck- supple, no JVD  CV- regular rate and rhythm no MRG, left sided CIED C/D/I. Lung- clear bilaterally  Abd- soft, nontender, nondistended  Ext- no edema bilaterally.   Skin- warm and dry    Current Facility-Administered Medications   Medication Dose Route Frequency    potassium chloride (KLOR-CON M) extended release tablet 40 mEq  40 mEq Oral Q4H    amiodarone (CORDARONE) 450 mg in dextrose 5 % 250 mL infusion ([id number])  0.5 mg/min IntraVENous Continuous    acetaminophen (TYLENOL) tablet 500 mg  500 mg Oral Q4H PRN    albuterol sulfate HFA (PROVENTIL;VENTOLIN;PROAIR) 108 (90 Base) MCG/ACT inhaler 2 puff  2 puff Inhalation Q6H PRN    apixaban (ELIQUIS) tablet 5 mg  5 mg Oral BID    [Held by provider] furosemide (LASIX) tablet 40 mg  40 mg Oral Daily    metoprolol succinate (TOPROL XL) extended release tablet 12.5 mg  12.5 mg Oral BID    niacin (NIASPAN) extended release tablet 500 mg  500 mg Oral Daily    spironolactone (ALDACTONE) tablet 25 mg  25 mg Oral Daily    sodium chloride flush 0.9 % injection 5-40 mL  5-40 mL IntraVENous 2 times per day    sodium chloride flush 0.9 % injection 5-40 mL  5-40 mL IntraVENous PRN    0.9 % sodium chloride infusion   IntraVENous PRN    polyethylene glycol (GLYCOLAX) packet 17 g  17 g Oral Daily PRN    0.9 % sodium chloride infusion   IntraVENous Continuous    [Held by provider] lisinopril (PRINIVIL;ZESTRIL) tablet 5 mg  5 mg Oral Daily    potassium chloride (KLOR-CON M) extended release tablet 40 mEq  40 mEq Oral PRN    Or    potassium bicarb-citric acid (EFFER-K) effervescent tablet 40 mEq  40 mEq Oral PRN    Or    potassium chloride 10 mEq/100 mL IVPB (Peripheral Line)  10 mEq IntraVENous PRN    magnesium sulfate 2000 mg in 50 mL IVPB premix  2,000 mg IntraVENous PRN       Data Review:   Recent Results (from the past 24 hour(s))   Troponin    Collection Time: 12/14/22  8:12 AM   Result Value Ref Range    Troponin, High Sensitivity 86.4 (H) 0 - 14 pg/mL   TSH with Reflex    Collection Time: 12/14/22  8:12 AM   Result Value Ref Range    TSH w Free Thyroid if Abnormal 3.16 0.358 - 3.740 UIU/ML   Brain Natriuretic Peptide    Collection Time: 12/14/22  8:12 AM   Result Value Ref Range    NT Pro-BNP 1,350 (H) <450 PG/ML   Transthoracic echocardiogram (TTE) complete with contrast, bubble, strain, and 3D PRN    Collection Time: 12/14/22  1:44 PM   Result Value Ref Range    LV EDV A2C 392 mL    LV EDV A4C 389 mL    LV ESV A2C 316 mL    LV ESV A4C 314 mL    IVSd 1.2 (A) 0.6 - 1.0 cm    LVIDd 7.4 (A) 4.2 - 5.9 cm    LVIDs 6.9 cm    LVOT Diameter 2.1 cm    LVOT Mean Gradient 2 mmHg    LVOT VTI 15.8 cm    LVOT Peak Velocity 1.1 m/s    LVOT Peak Gradient 5 mmHg    LVPWd 1.1 (A) 0.6 - 1.0 cm    LV E' Lateral Velocity 5 cm/s    LV E' Septal Velocity 4 cm/s    LV Ejection Fraction A2C 19 %    LV Ejection Fraction A4C 19 %    EF BP 19 (A) 55 - 100 %    LVOT Area 3.5 cm2    LVOT SV 54.7 ml    LA Minor Axis 6.1 cm    LA Major Axis 6.6 cm    LA Area 2C 23.1 cm2    LA Area 4C 20.6 cm2    LA Volume 2C 71 (A) 18 - 58 mL    LA Volume 4C 55 18 - 58 mL    LA Volume BP 65 (A) 18 - 58 mL    LA Diameter 4.6 cm    AV Mean Velocity 1.0 m/s    AV Mean Gradient 5 mmHg    AV VTI 23.7 cm    AV Peak Velocity 1.7 m/s    AV Peak Gradient 11 mmHg    AV Area by VTI 2.3 cm2    AV Area by Peak Velocity 2.2 cm2    Aortic Root 2.8 cm Ascending Aorta 3.2 cm    IVC Proxmal 1.4 cm    MV E Wave Deceleration Time 304.0 ms    MV A Velocity 0.79 m/s    MV E Velocity 0.42 m/s    PV .0 ms    PV Max Velocity 0.8 m/s    PV Peak Gradient 2 mmHg    RV Basal Dimension 3.4 cm    RV Free Wall Peak S' 9 cm/s    TAPSE 1.5 (A) 1.7 cm    TR Max Velocity 2.52 m/s    TR Peak Gradient 25 mmHg    Body Surface Area 1.75 m2    Fractional Shortening 2D 7 28 - 44 %    LV ESV Index A4C 178 mL/m2    LV EDV Index A4C 221 mL/m2    LV ESV Index A2C 180 mL/m2    LV EDV Index A2C 223 mL/m2    LVIDd Index 4.20 cm/m2    LVIDs Index 3.92 cm/m2    LV RWT Ratio 0.30     LV Mass 2D 422.8 (A) 88 - 224 g    LV Mass 2D Index 240.2 (A) 49 - 115 g/m2    MV E/A 0.53     E/E' Ratio (Averaged) 9.45     E/E' Lateral 8.40     E/E' Septal 10.50     LA Volume Index BP 37 (A) 16 - 34 ml/m2    LVOT Stroke Volume Index 31.1 mL/m2    LA Volume Index 2C 40 (A) 16 - 34 mL/m2    LA Volume Index 4C 31 16 - 34 mL/m2    LA Size Index 2.61 cm/m2    LA/AO Root Ratio 1.64     Ao Root Index 1.59 cm/m2    Ascending Aorta Index 1.82 cm/m2    AV Velocity Ratio 0.65     LVOT:AV VTI Index 0.67     KATHLEEN/BSA VTI 1.3 cm2/m2    KATHLEEN/BSA Peak Velocity 1.3 cm2/m2    Est. RA Pressure 3 mmHg    RVSP 28 mmHg   Potassium    Collection Time: 12/14/22  7:53 PM   Result Value Ref Range    Potassium 3.0 (L) 3.5 - 5.1 mmol/L   Basic Metabolic Panel    Collection Time: 12/15/22  4:42 AM   Result Value Ref Range    Sodium 136 133 - 143 mmol/L    Potassium 3.2 (L) 3.5 - 5.1 mmol/L    Chloride 103 101 - 110 mmol/L    CO2 29 21 - 32 mmol/L    Anion Gap 4 2 - 11 mmol/L    Glucose 102 (H) 65 - 100 mg/dL    BUN 11 8 - 23 MG/DL    Creatinine 1.70 (H) 0.8 - 1.5 MG/DL    Est, Glom Filt Rate 42 (L) >60 ml/min/1.73m2    Calcium 9.0 8.3 - 10.4 MG/DL   Magnesium    Collection Time: 12/15/22  4:42 AM   Result Value Ref Range    Magnesium 2.7 (H) 1.8 - 2.4 mg/dL   CBC    Collection Time: 12/15/22  4:42 AM   Result Value Ref Range    WBC 7.6 4.3 - 11.1 K/uL    RBC 4.19 (L) 4.23 - 5.6 M/uL    Hemoglobin 12.4 (L) 13.6 - 17.2 g/dL    Hematocrit 38.2 (L) 41.1 - 50.3 %    MCV 91.2 82 - 102 FL    MCH 29.6 26.1 - 32.9 PG    MCHC 32.5 31.4 - 35.0 g/dL    RDW 15.4 (H) 11.9 - 14.6 %    Platelets 829 523 - 708 K/uL    MPV 9.1 (L) 9.4 - 12.3 FL    nRBC 0.00 0.0 - 0.2 K/uL       EKG:  (EKG has been independently visualized by me with interpretation below): A pace BIV pacemaker.

## 2022-12-15 NOTE — PROGRESS NOTES
Patient complaining of shortness of breath and states that he \"feels worse than when he came in and that fluid is building up. \" Patient not visibly in distress. Lungs sound clear to diminished. Patient's oxygen saturation 99% on room air. Notified Graylon Brunner, NP, and received orders for STAT CXR and to stop the normal saline infusion.

## 2022-12-16 VITALS
DIASTOLIC BLOOD PRESSURE: 85 MMHG | OXYGEN SATURATION: 97 % | TEMPERATURE: 97.2 F | SYSTOLIC BLOOD PRESSURE: 118 MMHG | BODY MASS INDEX: 20.24 KG/M2 | RESPIRATION RATE: 20 BRPM | WEIGHT: 136.69 LBS | HEART RATE: 59 BPM | HEIGHT: 69 IN

## 2022-12-16 LAB
ANION GAP SERPL CALC-SCNC: 7 MMOL/L (ref 2–11)
BUN SERPL-MCNC: 12 MG/DL (ref 8–23)
CALCIUM SERPL-MCNC: 9.1 MG/DL (ref 8.3–10.4)
CHLORIDE SERPL-SCNC: 106 MMOL/L (ref 101–110)
CO2 SERPL-SCNC: 23 MMOL/L (ref 21–32)
CREAT SERPL-MCNC: 1.7 MG/DL (ref 0.8–1.5)
GLUCOSE SERPL-MCNC: 113 MG/DL (ref 65–100)
MAGNESIUM SERPL-MCNC: 2.5 MG/DL (ref 1.8–2.4)
POTASSIUM SERPL-SCNC: 5.6 MMOL/L (ref 3.5–5.1)
SODIUM SERPL-SCNC: 136 MMOL/L (ref 133–143)

## 2022-12-16 PROCEDURE — G0378 HOSPITAL OBSERVATION PER HR: HCPCS

## 2022-12-16 PROCEDURE — 36415 COLL VENOUS BLD VENIPUNCTURE: CPT

## 2022-12-16 PROCEDURE — 99238 HOSP IP/OBS DSCHRG MGMT 30/<: CPT | Performed by: INTERNAL MEDICINE

## 2022-12-16 PROCEDURE — 94640 AIRWAY INHALATION TREATMENT: CPT

## 2022-12-16 PROCEDURE — 6370000000 HC RX 637 (ALT 250 FOR IP): Performed by: INTERNAL MEDICINE

## 2022-12-16 PROCEDURE — 1100000000 HC RM PRIVATE

## 2022-12-16 PROCEDURE — 83735 ASSAY OF MAGNESIUM: CPT

## 2022-12-16 PROCEDURE — 96366 THER/PROPH/DIAG IV INF ADDON: CPT

## 2022-12-16 PROCEDURE — 6370000000 HC RX 637 (ALT 250 FOR IP): Performed by: NURSE PRACTITIONER

## 2022-12-16 PROCEDURE — 2580000003 HC RX 258: Performed by: EMERGENCY MEDICINE

## 2022-12-16 PROCEDURE — 80048 BASIC METABOLIC PNL TOTAL CA: CPT

## 2022-12-16 PROCEDURE — 6370000000 HC RX 637 (ALT 250 FOR IP): Performed by: PHYSICIAN ASSISTANT

## 2022-12-16 PROCEDURE — 6360000002 HC RX W HCPCS: Performed by: EMERGENCY MEDICINE

## 2022-12-16 RX ORDER — AMIODARONE HYDROCHLORIDE 200 MG/1
400 TABLET ORAL EVERY 8 HOURS
Status: DISCONTINUED | OUTPATIENT
Start: 2022-12-16 | End: 2022-12-16 | Stop reason: HOSPADM

## 2022-12-16 RX ORDER — POTASSIUM CHLORIDE 20 MEQ/1
20 TABLET, EXTENDED RELEASE ORAL DAILY
Qty: 90 TABLET | Refills: 3 | Status: SHIPPED | OUTPATIENT
Start: 2022-12-16

## 2022-12-16 RX ORDER — METOPROLOL SUCCINATE 25 MG/1
50 TABLET, EXTENDED RELEASE ORAL 2 TIMES DAILY
Status: DISCONTINUED | OUTPATIENT
Start: 2022-12-16 | End: 2022-12-16 | Stop reason: HOSPADM

## 2022-12-16 RX ORDER — FAMOTIDINE 20 MG/1
20 TABLET, FILM COATED ORAL DAILY
Status: DISCONTINUED | OUTPATIENT
Start: 2022-12-16 | End: 2022-12-16 | Stop reason: HOSPADM

## 2022-12-16 RX ORDER — AMIODARONE HYDROCHLORIDE 400 MG/1
400 TABLET ORAL EVERY 8 HOURS
Qty: 90 TABLET | Refills: 2 | Status: SHIPPED | OUTPATIENT
Start: 2022-12-16

## 2022-12-16 RX ORDER — METOPROLOL SUCCINATE 50 MG/1
50 TABLET, EXTENDED RELEASE ORAL 2 TIMES DAILY
Qty: 30 TABLET | Refills: 3 | Status: SHIPPED | OUTPATIENT
Start: 2022-12-16

## 2022-12-16 RX ADMIN — AMIODARONE HYDROCHLORIDE 400 MG: 200 TABLET ORAL at 10:26

## 2022-12-16 RX ADMIN — SPIRONOLACTONE 25 MG: 25 TABLET ORAL at 08:54

## 2022-12-16 RX ADMIN — ALBUTEROL SULFATE 2 PUFF: 90 AEROSOL, METERED RESPIRATORY (INHALATION) at 09:11

## 2022-12-16 RX ADMIN — METOPROLOL SUCCINATE 25 MG: 25 TABLET, EXTENDED RELEASE ORAL at 08:54

## 2022-12-16 RX ADMIN — FAMOTIDINE 20 MG: 20 TABLET, FILM COATED ORAL at 09:28

## 2022-12-16 RX ADMIN — APIXABAN 5 MG: 5 TABLET, FILM COATED ORAL at 08:54

## 2022-12-16 RX ADMIN — NIACIN 500 MG: 500 TABLET, EXTENDED RELEASE ORAL at 08:55

## 2022-12-16 RX ADMIN — AMIODARONE HYDROCHLORIDE 0.5 MG/MIN: 50 INJECTION, SOLUTION INTRAVENOUS at 06:03

## 2022-12-16 ASSESSMENT — PAIN SCALES - GENERAL
PAINLEVEL_OUTOF10: 0

## 2022-12-16 NOTE — PROGRESS NOTES
I have discharged the patient after further instructions the patient understands teaching. Discharge summary provided, IV taken out and tele monitor returned.

## 2022-12-16 NOTE — DISCHARGE SUMMARY
[facility] Discharge Summary     Patient ID:  Marta Wright  [id number]  78 y.o.  1945    Admit date: 12/14/2022    Discharge date:  12/16/2022    Admitting Physician: Sita Smith MD     Discharge Physician: ASH Crockett CNP/Dr. Srinivas Hussein    Admission Diagnoses: Hypokalemia [E87.6]  Ventricular tachycardia [I47.20]  AICD discharge [Z45.02]    Discharge Diagnoses:   Patient Active Problem List    Diagnosis    Pulmonary HTN ([facility] [address]    Left ventricular apical thrombus    LBBB (left bundle branch block)    VT (ventricular tachycardia)    Ventricular tachycardia    Hypokalemia    AICD discharge    AICD (automatic cardioverter/defibrillator) present    Chronic systolic heart failure ([facility] [address]    Ischemic cardiomyopathy    HTN (hypertension), benign    Mixed hyperlipidemia    Coronary atherosclerosis of native coronary artery       Cardiology Procedures this admission:  EchoCardiogram  Consults: none    Hospital Course: Patient presented to the ER with c/o ICD shock. In the ED his potassium was noted to be 2.6 and creatinine was elevated at 2.20. Device interrogation confirmed ICD fire. He was started on IV amiodarone and admitted. Potassium was replaced and SHUKRI improved with hydration. He remained stable with no further VT. He was transitioned to Amiodarone 400 mg PO TID at discharge. His BB was also increased. Patient will have BMP on Monday, 12/19/2022. He will follow up with EP to discuss further options. At the time of discharge, patient was up feeling well without any complaints of chest pain or shortness of breath. For maximized medical therapy for CHF, patient will continue BB and ACE. The patient will follow up with [facility] -- Dr. Mame Ruiz. DISPOSITION: The patient is being discharged home in stable condition on a low saturated fat, low cholesterol and low salt diet.  The patient is instructed to advance activities as tolerated to the limit of fatigue or shortness of breath. The patient is instructed to call the office or return to the ER for immediate evaluation for any shortness of breath or chest pain not relieved by NTG. Discharge Exam: /85   Pulse 59   Temp 97.2 °F (36.2 °C) (Axillary)   Resp 20   Ht 5' 9\" (1.753 m)   Wt 136 lb 11 oz (62 kg)   SpO2 97%   BMI 20.18 kg/m²   Patient has been seen by Dr. Chelsey Dahl: see his progress note for exam details.     Recent Results (from the past 24 hour(s))   Basic Metabolic Panel    Collection Time: 12/16/22  3:32 AM   Result Value Ref Range    Sodium 136 133 - 143 mmol/L    Potassium 5.6 (H) 3.5 - 5.1 mmol/L    Chloride 106 101 - 110 mmol/L    CO2 23 21 - 32 mmol/L    Anion Gap 7 2 - 11 mmol/L    Glucose 113 (H) 65 - 100 mg/dL    BUN 12 8 - 23 MG/DL    Creatinine 1.70 (H) 0.8 - 1.5 MG/DL    Est, Glom Filt Rate 42 (L) >60 ml/min/1.73m2    Calcium 9.1 8.3 - 10.4 MG/DL   Magnesium    Collection Time: 12/16/22  3:32 AM   Result Value Ref Range    Magnesium 2.5 (H) 1.8 - 2.4 mg/dL         Patient Instructions:    Discharge Medication List as of 12/16/2022  2:18 PM        CONTINUE these medications which have CHANGED    Details   amiodarone (PACERONE) 400 MG tablet Take 1 tablet by mouth every 8 (eight) hours, Disp-90 tablet, R-2Normal      metoprolol succinate (TOPROL XL) 50 MG extended release tablet Take 1 tablet by mouth 2 times daily, Disp-30 tablet, R-3Normal      Potassium Chloride 20 MEQ extended release  Take 1 tablet by mouth daily      CONTINUE these medications which have NOT CHANGED    Details   albuterol sulfate HFA (PROVENTIL;VENTOLIN;PROAIR) 108 (90 Base) MCG/ACT inhaler INHALE 1 TO 2 PUFFS EVERY 4 TO 6 HOURS AS NEEDEDHistorical Med      acetaminophen (TYLENOL) 500 MG tablet Take by mouth every 4 hours as neededHistorical Med      BREO ELLIPTA 100-25 MCG/ACT AEPB DAWHistorical Med      furosemide (LASIX) 40 MG tablet TAKE 1 TABLET BY MOUTH EVERY DAYHistorical Med      niacin (NIASPAN) 500 MG extended release tablet TAKE 1 TABLET BY MOUTH EVERY DAYHistorical Med      apixaban (ELIQUIS) 5 MG TABS tablet Take 1 tablet by mouth 2 times daily, Disp-180 tablet, R-2Normal      enalapril (VASOTEC) 5 MG tablet Take 5 mg by mouth 2 times dailyHistorical Med           STOP taking these medications       spironolactone (ALDACTONE) 25 MG tablet Comments:   Reason for Stopping:                    Signed:  ASH Barker CNP  12/16/2022  1:37 PM

## 2022-12-16 NOTE — PROGRESS NOTES
Physician Progress Note      Yovany Yu  RADHA #:                  [id number]  :                       1945  ADMIT DATE:       2022 5:58 AM  DISCH DATE:  RESPONDING  PROVIDER #:        Valdo Black MD        QUERY TEXT:    Stage of Chronic Kidney Disease: Please provide further specificity, if known. Clinical indicators include: ckd, brain natriuretic peptide, pro-bnp, bun,   creatinine  Options provided:  -- Chronic kidney disease stage 1  -- Chronic kidney disease stage 2  -- Chronic kidney disease stage 3  -- Chronic kidney disease stage 3a  -- Chronic kidney disease stage 3b  -- Chronic kidney disease stage 4  -- Chronic kidney disease stage 5  -- Chronic kidney disease stage 5, requiring dialysis  -- End stage renal disease  -- Other - I will add my own diagnosis  -- Disagree - Not applicable / Not valid  -- Disagree - Clinically Unable to determine / Unknown        PROVIDER RESPONSE TEXT:    The patient has chronic kidney disease stage 3b.       Electronically signed by:  Valdo Black MD 2022 12:34 PM

## 2022-12-16 NOTE — PROGRESS NOTES
[facility] CARDIOLOGY PROGRESS NOTE           12/16/2022 9:56 AM    Admit Date: 12/14/2022    Admit Diagnosis: Hypokalemia [E87.6]  Ventricular tachycardia [I47.20]  AICD discharge [Z45.02]      Subjective:   No complaints this AM, no chest pain or shortness of breath    Interval History: (History of pertinent interval events obtained from nursing staff)    ROS:  GEN:  No fever or chills  Cardiovascular:  As noted above  Pulmonary:  As noted above  Neuro:  No new focal motor or sensory loss      Objective:     Vitals:    12/15/22 2238 12/16/22 0015 12/16/22 0436 12/16/22 0830   BP:  116/86 120/83 123/83   Pulse: 78 63 59 60   Resp: 20 16 22 19   Temp:  97.7 °F (36.5 °C) 97.3 °F (36.3 °C) 97.2 °F (36.2 °C)   TempSrc:  Oral Oral Oral   SpO2: 95% 95% 95% 96%   Weight:   136 lb 11 oz (62 kg)    Height:           Physical Exam:  Darya Estela, Well Nourished, No Acute Distress, Alert & Oriented x 3, appropriate mood. Neck- supple, no JVD  CV- regular rate and rhythm no MRG  Lung- clear bilaterally  Abd- soft, nontender, nondistended  Ext- no edema bilaterally.   Skin- warm and dry    Current Facility-Administered Medications   Medication Dose Route Frequency    famotidine (PEPCID) tablet 20 mg  20 mg Oral Daily    metoprolol succinate (TOPROL XL) extended release tablet 25 mg  25 mg Oral BID    hydrALAZINE (APRESOLINE) injection 10 mg  10 mg IntraVENous Q6H PRN    amiodarone (CORDARONE) 450 mg in dextrose 5 % 250 mL infusion ([id number])  0.5 mg/min IntraVENous Continuous    acetaminophen (TYLENOL) tablet 500 mg  500 mg Oral Q4H PRN    albuterol sulfate HFA (PROVENTIL;VENTOLIN;PROAIR) 108 (90 Base) MCG/ACT inhaler 2 puff  2 puff Inhalation Q6H PRN    apixaban (ELIQUIS) tablet 5 mg  5 mg Oral BID    [Held by provider] furosemide (LASIX) tablet 40 mg  40 mg Oral Daily    niacin (NIASPAN) extended release tablet 500 mg  500 mg Oral Daily    spironolactone (ALDACTONE) tablet 25 mg  25 mg Oral Daily sodium chloride flush 0.9 % injection 5-40 mL  5-40 mL IntraVENous 2 times per day    sodium chloride flush 0.9 % injection 5-40 mL  5-40 mL IntraVENous PRN    0.9 % sodium chloride infusion   IntraVENous PRN    polyethylene glycol (GLYCOLAX) packet 17 g  17 g Oral Daily PRN    0.9 % sodium chloride infusion   IntraVENous Continuous    [Held by provider] lisinopril (PRINIVIL;ZESTRIL) tablet 5 mg  5 mg Oral Daily    potassium chloride (KLOR-CON M) extended release tablet 40 mEq  40 mEq Oral PRN    Or    potassium bicarb-citric acid (EFFER-K) effervescent tablet 40 mEq  40 mEq Oral PRN    Or    potassium chloride 10 mEq/100 mL IVPB (Peripheral Line)  10 mEq IntraVENous PRN    magnesium sulfate 2000 mg in 50 mL IVPB premix  2,000 mg IntraVENous PRN     Data Review:   Recent Results (from the past 24 hour(s))   Basic Metabolic Panel    Collection Time: 12/16/22  3:32 AM   Result Value Ref Range    Sodium 136 133 - 143 mmol/L    Potassium 5.6 (H) 3.5 - 5.1 mmol/L    Chloride 106 101 - 110 mmol/L    CO2 23 21 - 32 mmol/L    Anion Gap 7 2 - 11 mmol/L    Glucose 113 (H) 65 - 100 mg/dL    BUN 12 8 - 23 MG/DL    Creatinine 1.70 (H) 0.8 - 1.5 MG/DL    Est, Glom Filt Rate 42 (L) >60 ml/min/1.73m2    Calcium 9.1 8.3 - 10.4 MG/DL   Magnesium    Collection Time: 12/16/22  3:32 AM   Result Value Ref Range    Magnesium 2.5 (H) 1.8 - 2.4 mg/dL       EKG:  (EKG has been independently visualized by me with interpretation below)  Assessment:     Principal Problem:    Ventricular tachycardia  Active Problems:    AICD discharge  Resolved Problems:    * No resolved hospital problems. *  Mr. Kaylyn Kimble is a 78year old male with advanced CAD with a history of PCIx6 who was admitted with recurrent VT. He previously went into VT storm in 2018 when he received nearly 40 shocks from his device.    Plan:   VT: will stop IV amio, transition to amiodarone 400mg Q8H, monitor telemetry, potassium has been replaced, if arrhythmia stable, consider home later today  HFrEF: D/C aldactone, increase metoprolol to 50mg XL Q12H, titrate up as tolerated, volume status stable  ICD: normal function, cont device checks    Ilia Reyes MD  Cardiology/Electrophysiology

## 2022-12-16 NOTE — PLAN OF CARE
Problem: Discharge Planning  Goal: Discharge to home or other facility with appropriate resources  Outcome: Adequate for Discharge     Problem: Safety - Adult  Goal: Free from fall injury  Outcome: Adequate for Discharge     Problem: ABCDS Injury Assessment  Goal: Absence of physical injury  Outcome: Adequate for Discharge     Problem: Cardiovascular - Adult  Goal: Maintains optimal cardiac output and hemodynamic stability  Outcome: Adequate for Discharge  Goal: Absence of cardiac dysrhythmias or at baseline  Outcome: Adequate for Discharge     Problem: Chronic Conditions and Co-morbidities  Goal: Patient's chronic conditions and co-morbidity symptoms are monitored and maintained or improved  Outcome: Adequate for Discharge

## 2022-12-16 NOTE — CARE COORDINATION
Patient admitted with VT. CM met with patient this AM to assess for discharge needs. Patient is alert and oriented X 4. Patient is independent in ADLs and drives. Patient reports \" I stay inside\" at home. Patient is able to afford medications with Medicaid and medicare. He reports cost of Eliquis has gone down recently. DME: [employer]. Uses a cane. No history of home health or STR. CM anticipates patient discharging without any needs.

## 2022-12-20 ENCOUNTER — TELEPHONE (OUTPATIENT)
Dept: CARDIOLOGY CLINIC | Age: 77
End: 2022-12-20

## 2022-12-20 NOTE — TELEPHONE ENCOUNTER
Transitional Call  Admit date 12/14/22  Discharge date 12/16/22   Dx  V tach/hypokalemia  Called s/w pt. Reports feeling much better. Denies feelings of heart racing, CP, fatigue or SOB. Spironolactone was D/C'd at time of discharge. No new meds. Pt reports he  was referred to pulmonary for PFT but sees a Pulmonologist in [facility] and had PFT done before he was admitted. States dropped this report off at our office. Reminded pt of F/U appt with Dr. Keila Mario this week, 12/22. Pt states that he does not have any transportation to [facility] office and needs to be seen at the [facility] office. I explained to pt that this appt was a hospital F/U appt and if possible to try and keep appt. Pt states he has no one to bring him to the [facility] office. Transferred to scheduling to R/S appt.

## 2022-12-21 ENCOUNTER — TELEPHONE (OUTPATIENT)
Dept: CARDIOLOGY CLINIC | Age: 77
End: 2022-12-21

## 2022-12-21 NOTE — TELEPHONE ENCOUNTER
Called pt trying to move appt to opening, but pt needs to have appt in [facility]. Pt can not come to [place] not able to drive and no ride.

## 2023-01-03 DIAGNOSIS — I25.5 ISCHEMIC CARDIOMYOPATHY: Primary | ICD-10-CM

## 2023-01-03 DIAGNOSIS — I50.22 CHRONIC SYSTOLIC HEART FAILURE (HCC): ICD-10-CM

## 2023-01-03 DIAGNOSIS — Z95.810 AICD (AUTOMATIC CARDIOVERTER/DEFIBRILLATOR) PRESENT: ICD-10-CM

## 2023-01-03 DIAGNOSIS — I47.20 VENTRICULAR TACHYCARDIA: ICD-10-CM

## (undated) DEVICE — GUIDEWIRE VASC L260CM DIA0.025IN TIP L7CM DIA3MM STD PTFE J

## (undated) DEVICE — 18 GA N.G. KIT, 10 PACK: Brand: SITE-RITE

## (undated) DEVICE — MICROPUNCTURE INTRODUCER SET SILHOUETTE TRANSITIONLESS WITH NITINOL WIRE GUIDE: Brand: MICROPUNCTURE

## (undated) DEVICE — Device

## (undated) DEVICE — SHTH INTRO W/GW 7F 11CM W/O OB -- AVANTI+ - ORDER AS EA